# Patient Record
Sex: FEMALE | Race: WHITE | NOT HISPANIC OR LATINO | Employment: UNEMPLOYED | URBAN - METROPOLITAN AREA
[De-identification: names, ages, dates, MRNs, and addresses within clinical notes are randomized per-mention and may not be internally consistent; named-entity substitution may affect disease eponyms.]

---

## 2017-01-24 ENCOUNTER — TELEPHONE (OUTPATIENT)
Dept: FAMILY MEDICINE | Facility: CLINIC | Age: 67
End: 2017-01-24

## 2017-01-24 ENCOUNTER — OFFICE VISIT (OUTPATIENT)
Dept: FAMILY MEDICINE | Facility: CLINIC | Age: 67
End: 2017-01-24
Payer: OTHER GOVERNMENT

## 2017-01-24 VITALS
SYSTOLIC BLOOD PRESSURE: 99 MMHG | HEART RATE: 106 BPM | RESPIRATION RATE: 16 BRPM | TEMPERATURE: 99 F | HEIGHT: 66 IN | DIASTOLIC BLOOD PRESSURE: 70 MMHG | BODY MASS INDEX: 39.65 KG/M2 | WEIGHT: 246.69 LBS | OXYGEN SATURATION: 93 %

## 2017-01-24 DIAGNOSIS — J10.1 INFLUENZA A: Primary | ICD-10-CM

## 2017-01-24 PROCEDURE — 87804 INFLUENZA ASSAY W/OPTIC: CPT | Mod: ,,, | Performed by: INTERNAL MEDICINE

## 2017-01-24 PROCEDURE — 99213 OFFICE O/P EST LOW 20 MIN: CPT | Mod: S$GLB,,, | Performed by: INTERNAL MEDICINE

## 2017-01-24 RX ORDER — OSELTAMIVIR PHOSPHATE 75 MG/1
75 CAPSULE ORAL 2 TIMES DAILY
Qty: 10 CAPSULE | Refills: 0 | Status: SHIPPED | OUTPATIENT
Start: 2017-01-24 | End: 2017-01-29

## 2017-01-24 RX ORDER — PROMETHAZINE HYDROCHLORIDE AND DEXTROMETHORPHAN HYDROBROMIDE 6.25; 15 MG/5ML; MG/5ML
5 SYRUP ORAL EVERY 6 HOURS PRN
Qty: 200 ML | Refills: 0 | Status: SHIPPED | OUTPATIENT
Start: 2017-01-24 | End: 2017-02-03

## 2017-01-24 RX ORDER — INTERFERON BETA-1A 30MCG/.5ML
KIT INTRAMUSCULAR
COMMUNITY
Start: 2017-01-15 | End: 2017-01-26 | Stop reason: SDUPTHER

## 2017-01-24 RX ORDER — DOXYCYCLINE 40 MG/1
40 CAPSULE ORAL DAILY
COMMUNITY
Start: 2017-01-14 | End: 2019-04-30

## 2017-01-24 NOTE — TELEPHONE ENCOUNTER
----- Message from Jennifer Moya sent at 1/24/2017  9:59 AM CST -----  Contact: self: [phone]  Patient is feeling like she has bronchitis and feeling dizzy. She would like office to call her in something to the pharmacy at:      [facility] [place] [address]  [facility]  [address]  [facility]  [place] [address]  Phone: [phone] Fax: [phone]

## 2017-01-24 NOTE — TELEPHONE ENCOUNTER
Notified patient that medication can not be called in without appointment. Appointment scheduled for fever cough and congestion

## 2017-01-24 NOTE — PATIENT INSTRUCTIONS
Review started get better and then he get worse [facility] immediately; Tylenol or Motrin every 4 hours for fever

## 2017-01-24 NOTE — MR AVS SNAPSHOT
[facility]  [address]  [place] [address]  Phone: [phone]  Fax: [phone]                  Naomi Toledo   2017 2:20 PM   Office Visit    Description:  Female : 1950   Provider:  Jose Blackman MD   Department:  [facility]           Reason for Visit     Cough     Back Pain     Nausea     Dizziness           Diagnoses this Visit        Comments    Influenza A    -  Primary            To Do List           Future Appointments        Provider Department Dept Phone    3/3/2017 9:40 AM Martha Roth MD [facility] Multiple Sclerosis [phone]      Goals (5 Years of Data)     None      Follow-Up and Disposition     Return for if you are not better return in 2 weeks.    Follow-up and Disposition History       These Medications        Disp Refills Start End    oseltamivir (TAMIFLU) 75 MG capsule 10 capsule 0 2017    Take 1 capsule (75 mg total) by mouth 2 (two) times daily. - Oral    [facility] [address] [facility] Ph #: [phone]       promethazine-dextromethorphan (PROMETHAZINE-DM) 6.25-15 mg/5 mL Syrp 200 mL 0 2017 2/3/2017    Take 5 mLs by mouth every 6 (six) hours as needed. - Oral    Pharmacy: [facility] [address] [facility]  [facility] Ph #: [phone]         [facility] On Call     [facility] On Call Nurse Care Line -  Assistance  Registered nurses in the [facility] On Call Center provide clinical advisement, health education, appointment booking, and other advisory services.  Call for this free service at [phone].             Medications           Message regarding Medications     Verify the changes and/or additions to your medication regime listed below are the same as discussed with your clinician today.  If any of these changes or additions are incorrect, please notify your healthcare provider.        START taking these  "NEW medications        Refills    oseltamivir (TAMIFLU) 75 MG capsule 0    Sig: Take 1 capsule (75 mg total) by mouth 2 (two) times daily.    Class: Normal    Route: Oral    promethazine-dextromethorphan (PROMETHAZINE-DM) 6.25-15 mg/5 mL Syrp 0    Sig: Take 5 mLs by mouth every 6 (six) hours as needed.    Class: Normal    Route: Oral      STOP taking these medications     cyclobenzaprine (FLEXERIL) 5 MG tablet 1-2 tablets PO TID prn           Verify that the below list of medications is an accurate representation of the medications you are currently taking.  If none reported, the list may be blank. If incorrect, please contact your healthcare provider. Carry this list with you in case of emergency.           Current Medications     AVONEX 30 mcg/0.5 mL PnKt     cholecalciferol, vitamin D3, (VITAMIN D3) 5,000 unit Tab Take 5,000 Units by mouth once daily.    interferon beta-1a 30 mcg/0.5 mL PnIj Inject 30 mcg into the muscle every 7 days.    naproxen (NAPROSYN) 500 MG tablet Take 1 tablet (500 mg total) by mouth 2 (two) times daily as needed.    ORACEA 40 mg capsule     oseltamivir (TAMIFLU) 75 MG capsule Take 1 capsule (75 mg total) by mouth 2 (two) times daily.    promethazine-dextromethorphan (PROMETHAZINE-DM) 6.25-15 mg/5 mL Syrp Take 5 mLs by mouth every 6 (six) hours as needed.           Clinical Reference Information           Vital Signs - Last Recorded  Most recent update: 1/24/2017  2:42 PM by Margareth Sharif MA    BP Pulse Temp Resp Ht Wt    99/70 (BP Location: Left arm, Patient Position: Sitting, BP Method: Automatic) 106 99 °F (37.2 °C) (Oral) 16 5' 6" (1.676 m) 111.9 kg (246 lb 11.1 oz)    SpO2 BMI             (!) 93% 39.82 kg/m2         Blood Pressure          Most Recent Value    BP  99/70 [lying 118/73 p 98, standing 123/75 p 119]      Allergies as of 1/24/2017     No Known Allergies      Immunizations Administered on Date of Encounter - 1/24/2017     None      Orders Placed During Today's Visit "      Normal Orders This Visit    Influenza antigen Nasopharyngeal Swab       Instructions    Review started get better and then he get worse [facility] immediately; Tylenol or Motrin every 4 hours for fever

## 2017-01-24 NOTE — PROGRESS NOTES
"Subjective:       Patient ID: Naomi Toledo is a 66 y.o. female.    Chief Complaint: Cough; Back Pain; Nausea; and Dizziness    HPI       CHIEF COMPLAINT: Cough(+).  HPI:     ONSET/TIMING: .    ago    DURATION:               Paroxysmal: no .    QUALITY/COURSE:. unchanged    INTENSITY/SEVERITY: Severity is #  5 (10 point scale)      The following symptoms/statements are positive if BOLD, negative otherwise.      CONTEXT/WHEN:  Tobacco_use. Smokers_in_home. Seasonal_pattern. Allergies/Hayfever. Sinusitis. Irritant_Exposure(smoke/dust/fumes). Exposure_to_others_with_similar_symptoms.        Similar_problems_in_past.   PAST TREATMENT OR EVALUATION:   previous PPD. Recent_previous_chest_x-ray. Recent_antibiotics.  Associated Symptoms:     sputum production: scant. copious. Hemoptysis.  Medical History: Past_pulmonary_infections.  Cardiovascular_disease.chronic_lung_disease.  tuberculosis. Asthma. AIDS. Gastroesophageal_reflux_disease .      Review of Systems    Objective:      Vitals:    01/24/17 1432   BP: 99/70   Pulse: 106   Resp: 16   Temp: 99 °F (37.2 °C)   TempSrc: Oral   SpO2: (!) 93%   Weight: 111.9 kg (246 lb 11.1 oz)   Height: 5' 6" (1.676 m)   PainSc:   4   PainLoc: Back     Physical Exam   Constitutional: She appears well-developed and well-nourished.   HENT:   Right Ear: External ear normal.   Left Ear: External ear normal.   Mouth/Throat: Oropharynx is clear and moist. No oropharyngeal exudate.   Cardiovascular: Normal rate, regular rhythm and normal heart sounds.  Exam reveals no friction rub.    No murmur heard.  Pulmonary/Chest: Effort normal and breath sounds normal. No respiratory distress. She has no wheezes. She has no rales. She exhibits no tenderness.   Abdominal: Soft. Bowel sounds are normal. There is no tenderness.   Musculoskeletal: She exhibits no edema.   Lymphadenopathy:     She has no cervical adenopathy.   Nursing note and vitals reviewed.        Assessment:       1. Influenza A        "   Plan:     Influenza A  -     oseltamivir (TAMIFLU) 75 MG capsule; Take 1 capsule (75 mg total) by mouth 2 (two) times daily.  Dispense: 10 capsule; Refill: 0  -     promethazine-dextromethorphan (PROMETHAZINE-DM) 6.25-15 mg/5 mL Syrp; Take 5 mLs by mouth every 6 (six) hours as needed.  Dispense: 200 mL; Refill: 0  -     Influenza antigen Nasopharyngeal Swab      Return for if you are not better return in 2 weeks.

## 2017-01-26 DIAGNOSIS — G35 MS (MULTIPLE SCLEROSIS): ICD-10-CM

## 2017-01-26 LAB
CTP QC/QA: YES
FLUAV AG NPH QL: POSITIVE
FLUBV AG NPH QL: NEGATIVE

## 2017-02-22 ENCOUNTER — DOCUMENTATION ONLY (OUTPATIENT)
Dept: NEUROLOGY | Facility: CLINIC | Age: 67
End: 2017-02-22

## 2017-02-22 NOTE — PROGRESS NOTES
Left VM for patient to give our office a call to reschedule her appointment with Dr. Roth on 3/3/2017.  Provider on Bereavement Leave

## 2017-02-27 ENCOUNTER — TELEPHONE (OUTPATIENT)
Dept: NEUROLOGY | Facility: CLINIC | Age: 67
End: 2017-02-27

## 2017-02-27 NOTE — TELEPHONE ENCOUNTER
Returned call to Naomi. Rescheduled her with Dr. Roth, at pt's request, to 3/24 at 8:20. Appt letter mailed to patient.

## 2017-02-27 NOTE — TELEPHONE ENCOUNTER
----- Message from Marietta Vines sent at 2/27/2017  9:07 AM CST -----  Contact: self @ [phone]  Pt says she is returning a call concerning her appt on 3-3-17 being guy.  pls call with a new appt.

## 2017-03-08 ENCOUNTER — PATIENT MESSAGE (OUTPATIENT)
Dept: NEUROLOGY | Facility: CLINIC | Age: 67
End: 2017-03-08

## 2017-03-08 DIAGNOSIS — G35 MS (MULTIPLE SCLEROSIS): ICD-10-CM

## 2017-03-08 DIAGNOSIS — G35 MULTIPLE SCLEROSIS: Primary | ICD-10-CM

## 2017-03-08 NOTE — TELEPHONE ENCOUNTER
Estelle sent the following message:    I have enough Avonex for this month. I would appreciate a 90 day(per order) prescription at the end of this month.   The best days for lab work for me would be any Monday, Wednesday, or Friday.

## 2017-03-13 ENCOUNTER — LAB VISIT (OUTPATIENT)
Dept: LAB | Facility: HOSPITAL | Age: 67
End: 2017-03-13
Attending: PSYCHIATRY & NEUROLOGY
Payer: MEDICARE

## 2017-03-13 DIAGNOSIS — G35 MULTIPLE SCLEROSIS: ICD-10-CM

## 2017-03-13 LAB
ALBUMIN SERPL BCP-MCNC: 4.1 G/DL
ALP SERPL-CCNC: 87 U/L
ALT SERPL W/O P-5'-P-CCNC: 18 U/L
AST SERPL-CCNC: 15 U/L
BASOPHILS # BLD AUTO: 0.02 K/UL
BASOPHILS NFR BLD: 0.3 %
BILIRUB DIRECT SERPL-MCNC: 0.3 MG/DL
BILIRUB SERPL-MCNC: 0.9 MG/DL
DIFFERENTIAL METHOD: ABNORMAL
EOSINOPHIL # BLD AUTO: 0.1 K/UL
EOSINOPHIL NFR BLD: 1.4 %
ERYTHROCYTE [DISTWIDTH] IN BLOOD BY AUTOMATED COUNT: 13 %
HCT VFR BLD AUTO: 46.7 %
HGB BLD-MCNC: 15.8 G/DL
LYMPHOCYTES # BLD AUTO: 1 K/UL
LYMPHOCYTES NFR BLD: 17.5 %
MCH RBC QN AUTO: 29.8 PG
MCHC RBC AUTO-ENTMCNC: 33.8 %
MCV RBC AUTO: 88 FL
MONOCYTES # BLD AUTO: 0.5 K/UL
MONOCYTES NFR BLD: 9.4 %
NEUTROPHILS # BLD AUTO: 4.1 K/UL
NEUTROPHILS NFR BLD: 71.1 %
PLATELET # BLD AUTO: 211 K/UL
PMV BLD AUTO: 11 FL
PROT SERPL-MCNC: 7.6 G/DL
RBC # BLD AUTO: 5.31 M/UL
WBC # BLD AUTO: 5.77 K/UL

## 2017-03-13 PROCEDURE — 80076 HEPATIC FUNCTION PANEL: CPT

## 2017-03-13 PROCEDURE — 36415 COLL VENOUS BLD VENIPUNCTURE: CPT | Mod: PO

## 2017-03-13 PROCEDURE — 85025 COMPLETE CBC W/AUTO DIFF WBC: CPT

## 2017-03-24 ENCOUNTER — OFFICE VISIT (OUTPATIENT)
Dept: NEUROLOGY | Facility: CLINIC | Age: 67
End: 2017-03-24
Payer: MEDICARE

## 2017-03-24 ENCOUNTER — PATIENT MESSAGE (OUTPATIENT)
Dept: FAMILY MEDICINE | Facility: CLINIC | Age: 67
End: 2017-03-24

## 2017-03-24 VITALS
SYSTOLIC BLOOD PRESSURE: 104 MMHG | WEIGHT: 234 LBS | DIASTOLIC BLOOD PRESSURE: 70 MMHG | HEIGHT: 66 IN | BODY MASS INDEX: 37.61 KG/M2

## 2017-03-24 DIAGNOSIS — R29.6 FALLS FREQUENTLY: ICD-10-CM

## 2017-03-24 DIAGNOSIS — Z71.89 COUNSELING REGARDING GOALS OF CARE: ICD-10-CM

## 2017-03-24 DIAGNOSIS — G89.29 CHRONIC PAIN OF BOTH KNEES: ICD-10-CM

## 2017-03-24 DIAGNOSIS — M25.561 CHRONIC PAIN OF BOTH KNEES: ICD-10-CM

## 2017-03-24 DIAGNOSIS — Z29.89 PROPHYLACTIC IMMUNOTHERAPY: ICD-10-CM

## 2017-03-24 DIAGNOSIS — G35 MS (MULTIPLE SCLEROSIS): Primary | ICD-10-CM

## 2017-03-24 DIAGNOSIS — M25.562 CHRONIC PAIN OF BOTH KNEES: ICD-10-CM

## 2017-03-24 PROCEDURE — 99213 OFFICE O/P EST LOW 20 MIN: CPT | Mod: PBBFAC | Performed by: PSYCHIATRY & NEUROLOGY

## 2017-03-24 PROCEDURE — 99215 OFFICE O/P EST HI 40 MIN: CPT | Mod: S$PBB,,, | Performed by: PSYCHIATRY & NEUROLOGY

## 2017-03-24 PROCEDURE — 99999 PR PBB SHADOW E&M-EST. PATIENT-LVL III: CPT | Mod: PBBFAC,,, | Performed by: PSYCHIATRY & NEUROLOGY

## 2017-03-24 NOTE — PROGRESS NOTES
Subjective:       Patient ID: Naomi Toledo is a 67 y.o. female who presents today for a routine clinic visit for MS.  She was accompanied by her .     MS HPI:  · DMT: Avonex  · Side effects from DMT? No  · Taking vitamin D3 as recommended? Yes -  Dose: 5,000 IU daily  · Overall she feels stable;   · She fell 3 times last winter.      SOCIAL HISTORY  Social History   Substance Use Topics    Smoking status: Never Smoker    Smokeless tobacco: Never Used    Alcohol use No     Living arrangements - the patient lives with her .  Employment Homemaker    MS ROS:  · Fatigue: Yes - endurance different from 5 years ago;  · Sleep Disturbance: No  · Bladder Dysfunction: Yes - urgency; manages it;   · Bowel Dysfunction: Yes - urgency; manages it;   · Spasticity: No  · Visual Symptoms: No  · Cognitive: No  · Mood Disorder: No  · Gait Disturbance: Yes - as in HPI; related to knees;   · Falls: Yes - as in HPI  · Hand Dysfunction: Yes - fine motor issues in left hand;   · Pain: Yes - knee pain  · Sexual Dysfunction: Not Assessed  · Skin Breakdown: No  · Tremors: No  · Dysphagia:  No  · Dysarthria:  No  · Heat sensitivity:  No  · Any un-met adaptive needs? No  · Copay Assist?  Yes - $0  ·         Objective:        1. 25 foot timed walk: 4.5 seconds--no assist; antalgic  Timed 25 Foot Walk: 8/19/2016   Did patient wear an AFO? No   Was assistive device used? No   Time for 25 Foot Walk (seconds) 4.9   Time for 25 Foot Walk (seconds) 4.9     Neurologic Exam        MENTAL STATUS:  grossly intact;   CRANIAL NERVE EXAM:  She has fine nystagmus in the abducting eye on both right    gaze and left gaze.  Extraocular muscles are intact.  Pupils are equal, round,    and reactive to light.  No facial asymmetry.  Facial sensation is intact    bilaterally.  There is no dysarthria.       MOTOR EXAM:   strength is 5/5 in all groups in    the lower extremities and upper extremities.       REFLEXES:  2+ and symmetric throughout in  all four extremities; toes are down    bilaterally.     SENSORY EXAM:  She has significant loss of vibration in the bilateral hands, L>R     COORDINATION:  Normal finger-to-nose exam.     GAIT:  Her gait is slightly wide based, but relatively stable        Labs:     Lab Results   Component Value Date    [id number] 42 08/11/2016    [id number] 38 07/08/2015    [id number] 39 05/08/2015       Diagnosis/Assessment/Plan:    1. Multiple Sclerosis  · Assessment: She is clinically stable; I suspect falls related to her knee issues  · Imaging:will continue to defer for now given clinical stability; will focus on PT / exercise plan  · Disease Modifying Therapies: continue Avonex; recent LFTs/CBC nl    2. MS Symptom Assessment / Management  · Bladder Dysfunction: continue to monitor  · Bowel Dysfunction: continue to monitor           3. Osteoarthritis of the knees--pt will f/u with Dr. Javier      Over 50% of this 40 minute visit was spent in direct face to face counseling of the patient about her MS and the management of her various sx;        Falls frequently  -     Ambulatory Referral to Physical/Occupational Therapy    Chronic pain of both knees  -     Ambulatory Referral to Physical/Occupational Therapy    MS (multiple sclerosis)  -     Ambulatory Referral to Physical/Occupational Therapy    F/u 6 mo with Fay Snyder PA-C

## 2017-03-24 NOTE — MR AVS SNAPSHOT
"    [facility]  [address]  [facility] [address]  Phone: [phone]                  Naomi Toledo   3/24/2017 8:20 AM   Office Visit    Description:  Female : 1950   Provider:  Martha Roth MD   Department:  [facility]           Reason for Visit     Neurologic Problem           Diagnoses this Visit        Comments    Falls frequently    -  Primary     Chronic pain of both knees         MS (multiple sclerosis)                To Do List           Goals (5 Years of Data)     None      Follow-Up and Disposition     Return in about 6 months (around 2017).      [facility]     [facility] On [facility] Nurse Care Line -  Assistance  Registered nurses in the [facility] On Call Center provide clinical advisement, health education, appointment booking, and other advisory services.  Call for this free service at [phone].             Medications           Message regarding Medications     Verify the changes and/or additions to your medication regime listed below are the same as discussed with your clinician today.  If any of these changes or additions are incorrect, please notify your healthcare provider.             Verify that the below list of medications is an accurate representation of the medications you are currently taking.  If none reported, the list may be blank. If incorrect, please contact your healthcare provider. Carry this list with you in case of emergency.           Current Medications     cholecalciferol, vitamin D3, (VITAMIN D3) 5,000 unit Tab Take 5,000 Units by mouth once daily.    interferon beta-1a 30 mcg/0.5 mL PnIj Inject 30 mcg into the muscle every 7 days.    naproxen (NAPROSYN) 500 MG tablet Take 1 tablet (500 mg total) by mouth 2 (two) times daily as needed.    ORACEA 40 mg capsule            Clinical Reference Information           Your Vitals Were     BP Height Weight BMI       104/70 5' 6" (1.676 m) 106.1 kg (234 lb) " 37.77 kg/m2       Blood Pressure          Most Recent Value    BP  104/70      Allergies as of 3/24/2017     No Known Allergies      Immunizations Administered on Date of Encounter - 3/24/2017     None      Orders Placed During Today's Visit      Normal Orders This Visit    Ambulatory Referral to Physical/Occupational Therapy       Language Assistance Services     ATTENTION: Language assistance services are available, free of charge. Please call [phone].      ATENCIÓN: Si habla español, tiene a keller disposición servicios gratuitos de asistencia lingüística. Llame al [phone].     CHÚ Ý: N?u b?n nói Ti?ng Vi?t, có các d?ch v? h? tr? ngôn ng? mi?n phí dành cho b?n. G?i s? [phone].         [facility] complies with applicable Federal civil rights laws and does not discriminate on the basis of race, color, national origin, age, disability, or sex.

## 2017-03-27 ENCOUNTER — TELEPHONE (OUTPATIENT)
Dept: FAMILY MEDICINE | Facility: CLINIC | Age: 67
End: 2017-03-27

## 2017-03-27 ENCOUNTER — PATIENT MESSAGE (OUTPATIENT)
Dept: FAMILY MEDICINE | Facility: CLINIC | Age: 67
End: 2017-03-27

## 2017-03-27 DIAGNOSIS — R73.9 HYPERGLYCEMIA: Primary | ICD-10-CM

## 2017-03-27 DIAGNOSIS — E66.01 MORBID OBESITY DUE TO EXCESS CALORIES: ICD-10-CM

## 2017-03-31 ENCOUNTER — LAB VISIT (OUTPATIENT)
Dept: LAB | Facility: HOSPITAL | Age: 67
End: 2017-03-31
Attending: INTERNAL MEDICINE
Payer: MEDICARE

## 2017-03-31 DIAGNOSIS — R73.9 HYPERGLYCEMIA: ICD-10-CM

## 2017-03-31 DIAGNOSIS — E66.01 MORBID OBESITY DUE TO EXCESS CALORIES: ICD-10-CM

## 2017-03-31 LAB
ALBUMIN SERPL BCP-MCNC: 4 G/DL
ALP SERPL-CCNC: 75 U/L
ALT SERPL W/O P-5'-P-CCNC: 16 U/L
ANION GAP SERPL CALC-SCNC: 11 MMOL/L
AST SERPL-CCNC: 15 U/L
BILIRUB SERPL-MCNC: 0.8 MG/DL
BUN SERPL-MCNC: 23 MG/DL
CALCIUM SERPL-MCNC: 9.4 MG/DL
CHLORIDE SERPL-SCNC: 105 MMOL/L
CHOLEST/HDLC SERPL: 3.6 {RATIO}
CO2 SERPL-SCNC: 25 MMOL/L
CREAT SERPL-MCNC: 1 MG/DL
EST. GFR  (AFRICAN AMERICAN): >60 ML/MIN/1.73 M^2
EST. GFR  (NON AFRICAN AMERICAN): 59.3 ML/MIN/1.73 M^2
GLUCOSE SERPL-MCNC: 104 MG/DL
HDL/CHOLESTEROL RATIO: 27.7 %
HDLC SERPL-MCNC: 191 MG/DL
HDLC SERPL-MCNC: 53 MG/DL
LDLC SERPL CALC-MCNC: 122 MG/DL
NONHDLC SERPL-MCNC: 138 MG/DL
POTASSIUM SERPL-SCNC: 4.4 MMOL/L
PROT SERPL-MCNC: 7.1 G/DL
SODIUM SERPL-SCNC: 141 MMOL/L
TRIGL SERPL-MCNC: 80 MG/DL

## 2017-03-31 PROCEDURE — 80053 COMPREHEN METABOLIC PANEL: CPT

## 2017-03-31 PROCEDURE — 83036 HEMOGLOBIN GLYCOSYLATED A1C: CPT

## 2017-03-31 PROCEDURE — 36415 COLL VENOUS BLD VENIPUNCTURE: CPT | Mod: PO

## 2017-03-31 PROCEDURE — 80061 LIPID PANEL: CPT

## 2017-04-01 LAB
ESTIMATED AVG GLUCOSE: 117 MG/DL
HBA1C MFR BLD HPLC: 5.7 %

## 2017-04-03 ENCOUNTER — CLINICAL SUPPORT (OUTPATIENT)
Dept: REHABILITATION | Facility: HOSPITAL | Age: 67
End: 2017-04-03
Attending: PSYCHIATRY & NEUROLOGY
Payer: MEDICARE

## 2017-04-03 DIAGNOSIS — R29.6 FALLS FREQUENTLY: ICD-10-CM

## 2017-04-03 DIAGNOSIS — G89.29 CHRONIC PAIN OF BOTH KNEES: Chronic | ICD-10-CM

## 2017-04-03 DIAGNOSIS — M25.562 CHRONIC PAIN OF BOTH KNEES: Chronic | ICD-10-CM

## 2017-04-03 DIAGNOSIS — M25.561 CHRONIC PAIN OF BOTH KNEES: Chronic | ICD-10-CM

## 2017-04-03 PROCEDURE — 97161 PT EVAL LOW COMPLEX 20 MIN: CPT | Mod: PN

## 2017-04-03 PROCEDURE — G8978 MOBILITY CURRENT STATUS: HCPCS | Mod: CJ,PN

## 2017-04-03 PROCEDURE — G8979 MOBILITY GOAL STATUS: HCPCS | Mod: CI,PN

## 2017-04-03 NOTE — PLAN OF CARE
TIME RECORD    Date: 04/03/2017    Start Time:  1202  Stop Time:  1259    PROCEDURES:    TIMED  Procedure Time Min.    Start:  Stop:     Start:  Stop:     Start:  Stop:     Start:  Stop:          UNTIMED  Procedure Time Min.   Initial eval Start:1202  Stop:1259 57 min    Start:  Stop:      Total Timed Minutes:  0  Total Timed Units:  0  Total Untimed Units:  1  Charges Billed/# of units:  1    OUTPATIENT PHYSICAL THERAPY   PATIENT EVALUATION  Onset Date: B knee pain prior to initial fall 10/15/2016 w fall on L knee  Primary Diagnosis:   1. Falls frequently     2. Chronic pain of both knees       Treatment Diagnosis: impaired functional mobility  Past Medical History:   Diagnosis Date    Blood transfusion     Deep vein thrombosis     MS (multiple sclerosis)     Plantar fasciitis of left foot 2/2014     Precautions: MS flair ups  Prior Therapy: 1 year ago for B knee and for shoulder issues  Medications: Naomi Toledo has a current medication list which includes the following prescription(s): cholecalciferol (vitamin d3), interferon beta-1a, naproxen, and oracea.  Nutrition:  Obese  History of Present Illness: MS diagnosis 2002, large lesion on cervical spine.Falls w injury to L knee. Pt seen for f/u w neurologist related to MS w recommendation for PT to address concerns w f  History of falls and chronic knee pain  Prior Level of Function: Independent  Social History: , hobbies: computer oriented, Bible study,   Place of Residence (Steps/Adaptations): single level home w no stairs  Functional Deficits Leading to Referral/Nature of Injury: referred due to concerns w falls and knee pain  Patient Therapy Goals: be free of knee pain w stair climbing and pain free after prolonged sitting    Subjective     Naomi Toledo states she has appt w orthopedist later this week for her knee pain. She is attributing it more to her age than the MS. Pt reports heat exacerbates symptoms and prefers ice when needed. She  "has tried PT in the past and is willing to return in hopes of resolving positional knee pain.    Pain:  Location: B anterior knee region   Description: Aching  Activities Which Increase Pain: stairs, prolonged position, pain when sleeping  Activities Which Decrease Pain: rest, reposition  Pain Scale: 0/10 at best 0/10 now  5/10 at worst    Objective     Posture: forward head, rounded shoulders  Palpation: tender to pressure at patellar tendon B  Sensation: intact  DTRs:2+ gastroc and quads  Range of Motion/Strength:   Knee  Right   Left  Pain/Dysfunction with Movement    AROM PROM MMT AROM PROM MMT    knee flexion 115  5 115  5    Knee ext 0  5 0  5 Ant knee pain w resistance   Hip flex   5   5    Hip add   4   4    Hip abd   5   5    Ankle DF   5   5    Ankle PF   4+   4+      Flexibility: popliteal angle -24* B  Gait: Without AD  Analysis: decreased step length, decreased wt shift  Bed Mobility:Independent  Transfers: Independent  Special Tests:   Lower Extremity Functional Scale:53/80=33.75% impairment  G code modifier current:  G code modifier goal:CI    Elizabeth Balance Scale:54/56    Treatment: Discussed goals w pt in agreement w proposed POC. Assessed ability to flex and ext knee against  resistance w  tolerating 22# well. Able to tolerate Shuttle 50# B, however w R then  L pt w increase in knee pain at tibial plateau    Assessment       Initial Assessment (Pertinent finding, problem list and factors affecting outcome): Pt tested well on BBS w exception of single leg stance and 360* turn speed time. Muscle weakness noted B gastroc and hip adductors. Pt would benefit from skilled PT to address improving higher level balance, and gait for fall prevention as well as addressing pain reduction B knees  Rehab Potiential: good    Short Term Goals (3 Weeks):   1. Instruct in HEP  2. Pt performs 10 single leg heel raises without HHA  3. Pt pain free w 3 x 10 reps shuttle 75# B  4. Pt ascends 6" step 3 x 10 reps " free of knee pain R/L  Long Term Goals (6 Weeks):   1. Pt I w HEP  2. Pt w SLS 20 sec R/L no HHA  3. LEFS less than 20% impairment  4. Pt ascends /descends 21 stairs with no report of knee pain    Plan     Certification Period: 4/3/17 to 5/22/17  Recommended Treatment Plan: 2 times per week for 6 weeks: Electrical Stimulation IFC prn, Gait Training, Group Therapy, Manual Therapy, Moist Heat/ Ice, Neuromuscular Re-ed, Patient Education, Therapeutic Activites and Therapeutic Exercise  Other Recommendations: kinesiotaping      Therapist: Emely Ramachandran, PT    I CERTIFY THE NEED FOR THESE SERVICES FURNISHED UNDER THIS PLAN OF TREATMENT AND WHILE UNDER MY CARE    Physician's comments: ________________________________________________________________________________________________________________________________________________      Physician's Name: ___________________________________

## 2017-04-04 DIAGNOSIS — M25.569 KNEE PAIN, UNSPECIFIED CHRONICITY, UNSPECIFIED LATERALITY: Primary | ICD-10-CM

## 2017-04-05 ENCOUNTER — HOSPITAL ENCOUNTER (OUTPATIENT)
Dept: RADIOLOGY | Facility: HOSPITAL | Age: 67
Discharge: HOME | End: 2017-04-05
Attending: ORTHOPAEDIC SURGERY
Payer: MEDICARE

## 2017-04-05 ENCOUNTER — OFFICE VISIT (OUTPATIENT)
Dept: ORTHOPEDICS | Facility: CLINIC | Age: 67
End: 2017-04-05
Payer: MEDICARE

## 2017-04-05 VITALS — WEIGHT: 234 LBS | HEIGHT: 66 IN | BODY MASS INDEX: 37.61 KG/M2

## 2017-04-05 DIAGNOSIS — M17.12 OSTEOARTHROSIS, LOCALIZED, PRIMARY, KNEE, LEFT: Primary | ICD-10-CM

## 2017-04-05 DIAGNOSIS — M17.11 OSTEOARTHROSIS, LOCALIZED, PRIMARY, KNEE, RIGHT: ICD-10-CM

## 2017-04-05 DIAGNOSIS — M25.569 KNEE PAIN, UNSPECIFIED CHRONICITY, UNSPECIFIED LATERALITY: ICD-10-CM

## 2017-04-05 PROCEDURE — 99999 PR PBB SHADOW E&M-EST. PATIENT-LVL II: CPT | Mod: PBBFAC,,, | Performed by: ORTHOPAEDIC SURGERY

## 2017-04-05 PROCEDURE — 73564 X-RAY EXAM KNEE 4 OR MORE: CPT | Mod: TC,50

## 2017-04-05 PROCEDURE — 99213 OFFICE O/P EST LOW 20 MIN: CPT | Mod: S$PBB,,, | Performed by: ORTHOPAEDIC SURGERY

## 2017-04-05 PROCEDURE — 73564 X-RAY EXAM KNEE 4 OR MORE: CPT | Mod: 26,50,, | Performed by: RADIOLOGY

## 2017-04-06 NOTE — PROGRESS NOTES
"DATE: 4/5/2017  PATIENT: Naomi Toledo    Attending Physician: Gary Javier M.D.    HISTORY:  Naomi Toledo is a 67 y.o. female who returns for follow up evaluation of  her bilateral knees.  She is been previously diagnosed with osteoarthrosis.  She states her left knee bothers her more than the right.  I last saw her in February 2016.  She states that she fell 3 times since the alicia.  She is currently taking no medicine.  She does note discomfort medially more so than laterally.  Pain is reported at 1/10 currently.  Today for evaluation.    PMH/PSH/FamHx/SocHx:  Reviewed and unchanged from prior visit    ROS:  Constitution: Negative for chills, fever, and sweats. Negative for unexplained weight loss.  HENT: Negative for headaches and blurry vision.   Cardiovascular: Negative for chest pain, irregular heartbeat, leg swelling and palpitations.   Respiratory: Negative for cough and shortness of breath.   Gastrointestinal: Negative for abdominal pain, heartburn, nausea and vomiting.   Genitourinary: Negative for bladder incontinence and dysuria.   Musculoskeletal: Negative for systemic arthritis, joint swelling, muscle weakness and myalgias.   Neurological: Negative for numbness.   Psychiatric/Behavioral: Negative for depression.   Endocrine: Negative for polyuria.   Hematologic/Lymphatic: Negative for bleeding disorders.  Skin: Negative for poor wound healing.       EXAM:  Ht 5' 6" (1.676 m)  Wt 106.1 kg (234 lb)  BMI 37.77 kg/m2  Naomi Toledo is a well developed, well nourished female in no acute distress. Physical examination of the bilateral knee evaluated the following:     Gait and Alignment  Inspection for ecchymosis, swelling, atrophy, or deformity  Inspection for intra-articular and/or bursal effusions  Tenderness to palpation over the bony and soft tissue structures around the knee  Range of Motion and presence of extensor lag/contractures  Sensation and motor strength  Varus/valgus or " anterior/posterior/rotatory instability  Flexion pinch and Ramin's Tests  Patellar alignment/tracking/pain to palpation     Remarkable findings included:  normal alignment. No effusions noted. There is medial joint line tenderness left greater than right. Range of motion 0-125° bilaterally. No instability to on exam to either knee. Positive flexion pinch left greater than right. Ramin's testing nonspecific.       IMAGING:   X-rays of both knees performed today and reviewed.  No acute fractures or dislocations are seen.  Moderate to significant degenerative changes with medial compartment near bone-on-bone narrowing noted bilaterally.    ASSESSMENT:  Osteoarthrosis both knees    PLAN:  The implications of the patient's evolution of symptoms and findings were explained to the patient in detail.Treatment option discussed included observation, activity modification, anti-inflammatory medication (she cannot take due to GI distress), cortisone injection, and referral for arthroplasty.. All questions answered and the patient wishes to observe her symptoms at the present time.  She states her symptoms are tolerable at 1/10.  Should her symptoms worsen, she'll return for reexamination and treatment recommendations..          This note was dictated using voice recognition software and may contain grammatical errors

## 2017-04-07 ENCOUNTER — OFFICE VISIT (OUTPATIENT)
Dept: FAMILY MEDICINE | Facility: CLINIC | Age: 67
End: 2017-04-07
Payer: MEDICARE

## 2017-04-07 ENCOUNTER — DOCUMENTATION ONLY (OUTPATIENT)
Dept: FAMILY MEDICINE | Facility: CLINIC | Age: 67
End: 2017-04-07

## 2017-04-07 VITALS
WEIGHT: 235.69 LBS | SYSTOLIC BLOOD PRESSURE: 113 MMHG | OXYGEN SATURATION: 100 % | DIASTOLIC BLOOD PRESSURE: 75 MMHG | HEART RATE: 73 BPM | HEIGHT: 66 IN | TEMPERATURE: 98 F | BODY MASS INDEX: 37.88 KG/M2

## 2017-04-07 DIAGNOSIS — G35 MULTIPLE SCLEROSIS: ICD-10-CM

## 2017-04-07 DIAGNOSIS — Z23 NEED FOR TETANUS BOOSTER: ICD-10-CM

## 2017-04-07 DIAGNOSIS — Z23 NEED FOR VACCINATION WITH 13-POLYVALENT PNEUMOCOCCAL CONJUGATE VACCINE: ICD-10-CM

## 2017-04-07 DIAGNOSIS — N95.9 MENOPAUSAL AND PERIMENOPAUSAL DISORDER: ICD-10-CM

## 2017-04-07 DIAGNOSIS — R73.9 HYPERGLYCEMIA: Primary | ICD-10-CM

## 2017-04-07 PROCEDURE — 99213 OFFICE O/P EST LOW 20 MIN: CPT | Mod: S$GLB,,, | Performed by: INTERNAL MEDICINE

## 2017-04-07 PROCEDURE — 90670 PCV13 VACCINE IM: CPT | Mod: S$GLB,,, | Performed by: INTERNAL MEDICINE

## 2017-04-07 PROCEDURE — G0009 ADMIN PNEUMOCOCCAL VACCINE: HCPCS | Mod: S$GLB,,, | Performed by: INTERNAL MEDICINE

## 2017-04-07 NOTE — PROGRESS NOTES
Health Maintenance Due   Topic Date Due    TETANUS VACCINE  02/28/1970    DEXA SCAN  02/28/1992    Zoster Vaccine  02/28/2012    Influenza Vaccine  08/01/2016    Pneumococcal (65+) (1 of 2 - PCV13) 02/28/2017

## 2017-04-07 NOTE — PROGRESS NOTES
"Subjective:       Patient ID: Naomi Toledo is a 67 y.o. female.    Chief Complaint: Follow-up    HPI       Patient has multiple sclerosis.  It's under very good control.  She has no weakness.  No neurological complaints.                                                                                             CHIEF COMPLAINT: hyperglycemia. .  HPI:     ONSET/TIMING:     QUALITY/COURSE:   unchanged..  Controlled: yes.     INTENSITY/SEVERITY: . Measures blood sugar :  none     CONTEXT/WHEN: last HgbA1c    Lab Results   Component Value Date    HGBA1C 5.7 03/31/2017      weights:    Wt Readings from Last 1 Encounters:   04/07/17 1122 106.9 kg (235 lb 10.8 oz)         SYMPTOMS/RELATED: . . Possible medication side effects include:     The following symptoms/statements  are present IF IN BOLD, negative otherwise.         MODIFIERS/TREATMENTS: Not_taking_medications:  .  Non-compliance_with_Diabetic_diet  .  No_eye_exam_within_last_year.  Not_practicing_good_foot_care.    REVIEW OF SYMPTOMS: . Weight_gain. Weight_loss 34 lbs. . Neuropathy. Recurrent_infections. Skin_ulcers    Review of Systems    Objective:      Vitals:    04/07/17 1122   BP: 113/75   Pulse: 73   Temp: 98 °F (36.7 °C)   TempSrc: Oral   SpO2: 100%   Weight: 106.9 kg (235 lb 10.8 oz)   Height: 5' 6" (1.676 m)   PainSc: 0-No pain     Physical Exam   Constitutional: She appears well-developed and well-nourished.   Eyes: Pupils are equal, round, and reactive to light.   Cardiovascular: Normal rate, regular rhythm and normal heart sounds.    Pulmonary/Chest: Effort normal and breath sounds normal.   Abdominal: Soft. There is no tenderness.   Neurological: She is alert.   Psychiatric: She has a normal mood and affect. Her behavior is normal. Thought content normal.   Nursing note and vitals reviewed.        Assessment:       1. Hyperglycemia    2. Need for tetanus booster    3. Need for vaccination with 13-polyvalent pneumococcal conjugate vaccine    4. " Menopausal and perimenopausal disorder    5. BMI 38.0-38.9,adult    6. Multiple sclerosis          Plan:     Hyperglycemia  -     Hemoglobin A1c; Future; Expected date: 4/7/17    Need for tetanus booster    Need for vaccination with 13-polyvalent pneumococcal conjugate vaccine  -     Pneumococcal Conjugate Vaccine (13 Valent) (IM)    Menopausal and perimenopausal disorder  -     DXA Bone Density Spine And Hip; Future; Expected date: 4/7/17    BMI 38.0-38.9,adult    Multiple sclerosis      Return in about 6 months (around 10/7/2017).

## 2017-04-07 NOTE — MR AVS SNAPSHOT
[facility]  [address]  [place] [address]  Phone: [phone]  Fax: [phone]                  Naomi Toledo   2017 11:40 AM   Office Visit    Description:  Female : 1950   Provider:  Jose Blackman MD   Department:  [facility]           Reason for Visit     Follow-up           Diagnoses this Visit        Comments    Hyperglycemia    -  Primary     Need for tetanus booster         Need for vaccination with 13-polyvalent pneumococcal conjugate vaccine         Menopausal and perimenopausal disorder         BMI 38.0-38.9,adult         Multiple sclerosis                To Do List           Future Appointments        Provider Department Dept Phone    4/10/2017 1:00 PM Yadira Solano, PT [facility] [phone]    2017 11:00 AM Emely Ramachandran, PT [facility] [phone]    2017 9:00 AM Emely Ramachandran PT [facility] [phone]    2017 9:00 AM Emely Ramachandran PT [facility] [phone]    2017 9:00 AM Emely Ramachandran, PT [facility] [phone]      Goals (5 Years of Data)     None      Follow-Up and Disposition     Return in about 6 months (around 10/7/2017).      [facility]     [facility] Care [facility] -  Assistance  Unless otherwise directed by your provider, please contact [facility], our nurse care line that is available for  assistance.     Registered nurses in the [facility] provide: appointment scheduling, clinical advisement, health education, and other advisory services.  Call: [phone] (toll free)               Medications           Message regarding Medications     Verify the changes and/or additions to your medication regime listed below are the same as discussed with your clinician today.  If any of these changes or additions are incorrect, please notify your healthcare  "provider.             Verify that the below list of medications is an accurate representation of the medications you are currently taking.  If none reported, the list may be blank. If incorrect, please contact your healthcare provider. Carry this list with you in case of emergency.           Current Medications     cholecalciferol, vitamin D3, (VITAMIN D3) 5,000 unit Tab Take 5,000 Units by mouth once daily.    interferon beta-1a 30 mcg/0.5 mL PnIj Inject 30 mcg into the muscle every 7 days.    naproxen (NAPROSYN) 500 MG tablet Take 1 tablet (500 mg total) by mouth 2 (two) times daily as needed.    ORACEA 40 mg capsule            Clinical Reference Information           Your Vitals Were     BP Pulse Temp Height Weight SpO2    113/75 (BP Location: Right arm, Patient Position: Sitting, BP Method: Automatic) 73 98 °F (36.7 °C) (Oral) 5' 6" (1.676 m) 106.9 kg (235 lb 10.8 oz) 100%    BMI                38.04 kg/m2          Blood Pressure          Most Recent Value    BP  113/75      Allergies as of 4/7/2017     No Known Allergies      Immunizations Administered on Date of Encounter - 4/7/2017     Name Date Dose VIS Date Route    Pneumococcal Conjugate - 13 Valent 4/7/2017 0.5 mL 11/5/2015 Intramuscular      Orders Placed During Today's Visit      Normal Orders This Visit    Pneumococcal Conjugate Vaccine (13 Valent) (IM)     Future Labs/Procedures Expected by Expires    DXA Bone Density Spine And Hip  4/7/2017 7/6/2017    Hemoglobin A1c  4/7/2017 4/8/2018      Instructions    The Medicare you have to go the pharmacy to get the DTaP.    Continue your very successful diet.       Language Assistance Services     ATTENTION: Language assistance services are available, free of charge. Please call [phone].      ATENCIÓN: Si habla español, tiene a keller disposición servicios gratuitos de asistencia lingüística. Llame al [phone].     CHÚ Ý: N?u b?n nói Ti?ng Vi?t, có các d?ch v? h? tr? ngôn ng? mi?n phí dành cho b?n. " G?i s? [phone].         [facility] complies with applicable Federal civil rights laws and does not discriminate on the basis of race, color, national origin, age, disability, or sex.

## 2017-04-07 NOTE — PROGRESS NOTES
Allergies and two pt identifiers verified.  Prevnar 13 vaccine administered IM per MD order and MAR.  Tolerated injection well.  VIS given and instructed to wait 15 minutes before leaving clinic to monitor for adverse reactions.  Voiced understanding.

## 2017-04-10 ENCOUNTER — CLINICAL SUPPORT (OUTPATIENT)
Dept: REHABILITATION | Facility: HOSPITAL | Age: 67
End: 2017-04-10
Attending: PSYCHIATRY & NEUROLOGY
Payer: MEDICARE

## 2017-04-10 DIAGNOSIS — M25.562 CHRONIC PAIN OF BOTH KNEES: ICD-10-CM

## 2017-04-10 DIAGNOSIS — G89.29 CHRONIC PAIN OF BOTH KNEES: ICD-10-CM

## 2017-04-10 DIAGNOSIS — M25.561 CHRONIC PAIN OF BOTH KNEES: ICD-10-CM

## 2017-04-10 DIAGNOSIS — R29.6 FALLS FREQUENTLY: ICD-10-CM

## 2017-04-10 PROCEDURE — 97110 THERAPEUTIC EXERCISES: CPT | Mod: PN

## 2017-04-10 NOTE — PROGRESS NOTES
"Name: Naomi Toledo  Date:   04/10/2017  Primary Diagnosis: .  Problem List Items Addressed This Visit     Falls frequently    Chronic pain of both knees          Time in: 1301  Time Out: 1355  Total Treatment Time: 54  Group Time: 0      Subjective:    Patient reports no change in s/s from initial evaluation.  Patient reports their pain to be 0/10 on a 0-10 scale with 0 being no pain and 10 being the worst pain imaginable.    Objective    Patient received individual therapy to address strength, endurance, ROM and flexibility with 0 other patients with activities as follows:     Patient received therapeutic exercises to develop strength, endurance, ROM and flexibility for 54 minutes including:     Nustep L2 x 10 min LE's only  Standing gastroc stretch 3/30" B  // bars: standing airex EC 3/15 sec, SLS 3/15 sec level tile, HR/TR 3x10 level tile  QS 3x10   SLR flex 3x10  SAQ 3x10  Shuttle 75# 3x10 B, 50# 3x10 single, alternating    Assessment:     Patient tolerating treatment well.    Pt will continue to benefit from skilled PT intervention. Medical Necessity is demonstrated by:  Pain limits function of effected part for some activities, Unable to participate fully in daily activities and Weakness.    Patient is making good progress towards established goals.    Plan:  Continue with established Plan of Care towards PT goals.     "

## 2017-04-12 ENCOUNTER — CLINICAL SUPPORT (OUTPATIENT)
Dept: REHABILITATION | Facility: HOSPITAL | Age: 67
End: 2017-04-12
Attending: PSYCHIATRY & NEUROLOGY
Payer: MEDICARE

## 2017-04-12 DIAGNOSIS — R29.6 FALLS FREQUENTLY: ICD-10-CM

## 2017-04-12 DIAGNOSIS — G89.29 CHRONIC PAIN OF BOTH KNEES: ICD-10-CM

## 2017-04-12 DIAGNOSIS — M25.561 CHRONIC PAIN OF BOTH KNEES: ICD-10-CM

## 2017-04-12 DIAGNOSIS — M25.562 CHRONIC PAIN OF BOTH KNEES: ICD-10-CM

## 2017-04-12 PROCEDURE — 97150 GROUP THERAPEUTIC PROCEDURES: CPT | Mod: PN

## 2017-04-12 NOTE — PROGRESS NOTES
Name: Naomi Toledo  Clinic Number: [id number]  Date of Treatment: 04/12/2017   Diagnosis:   Encounter Diagnoses   Name Primary?    Falls frequently     Chronic pain of both knees        Time in: 1102  Time Out: 1159  Total Treatment Time: 57  Group Time: 0      Subjective:    Naomi reports noticing her need to wrk on her balance.  Patient reports their pain to be 0/10 on a 0-10 scale with 0 being no pain and 10 being the worst pain imaginable.    Objective    Patient received group therapy to increase strength, endurance, core stabilization and balance with 1 patients with activities as follows:     Naomi received therapeutic activities to develop strength, endurance, core stabilization and balance for 57 minutes including:     Bike 10' R 1.5  // bars:  standing gastroc stretch 3 x 30 sec B on foam dome   SLS 3 x 30 sec R/L on level tile then airex  Tandem stance 3 x 30 sec leading R then L 3 x 30 sec  HR x 30  Shuttle x 30:  75# B  50# R LE  32# L LE  50# B w wobble board    3 x 10:  33# B extension  22# R/L extension  33# B flexion   22# R/L flexion  Balance ex when Amb 4 x 30 ft:   head turn R/L   change in gait speed   change in direction      Written Home Exercises Provided: to review next session  Pt demo good understanding of the education provided. Naomi demonstrated good return demonstration of activities.     Assessment:       Pt will continue to benefit from skilled PT intervention. Medical Necessity is demonstrated by:  Pain limits function of effected part for some activities, Requires skilled supervision to complete and progress HEP and Weakness.    Patient is making fair progress towards established goals.    Plan:  Continue with established Plan of Care towards PT goals.

## 2017-04-13 NOTE — PATIENT INSTRUCTIONS
Knee Extension: Resisted (Sitting)        With band looped around right ankle and under other foot, straighten leg with ankle loop. Keep other leg bent to increase resistance.  Repeat 10____ times per set. Do ___3_ sets per session. Do __1__ sessions per day.     https://[employer]/690     Copyright © [employer]. All rights reserved.

## 2017-04-17 ENCOUNTER — CLINICAL SUPPORT (OUTPATIENT)
Dept: REHABILITATION | Facility: HOSPITAL | Age: 67
End: 2017-04-17
Attending: PSYCHIATRY & NEUROLOGY
Payer: MEDICARE

## 2017-04-17 DIAGNOSIS — M25.562 CHRONIC PAIN OF BOTH KNEES: ICD-10-CM

## 2017-04-17 DIAGNOSIS — M25.561 CHRONIC PAIN OF BOTH KNEES: ICD-10-CM

## 2017-04-17 DIAGNOSIS — R29.6 FALLS FREQUENTLY: ICD-10-CM

## 2017-04-17 DIAGNOSIS — G89.29 CHRONIC PAIN OF BOTH KNEES: ICD-10-CM

## 2017-04-17 PROCEDURE — 97110 THERAPEUTIC EXERCISES: CPT | Mod: PN

## 2017-04-17 NOTE — PROGRESS NOTES
Name: Naomi Toledo  Clinic Number: [id number]  Date of Treatment: 04/17/2017   Diagnosis:   Encounter Diagnoses   Name Primary?    Falls frequently     Chronic pain of both knees        Time in: 902  Time Out: 959  Total Treatment Time: 57  Group Time: 0      Subjective:    Naomi reports increase in low back pain and L hip pain. She reports L hip pain being similar to phlebitis experienced in the past. Patient reports their hip pain to be 3/10 and back pain 5/10 on a 0-10 scale with 0 being no pain and 10 being the worst pain imaginable.    Objective    Patient received individual therapy to increase strength, endurance, posture and core stabilization with 0 patients with activities as follows:     Naomi received therapeutic exercises to develop strength, endurance, posture and core stabilization for 57 minutes including:     NuStep L 2 10' w UE portion  // bars:  standing gastroc stretch 3 x 30 sec B  SLS 3 x 30 sec R/L  SLS w HR 10 R/L  Supine:  Clamshells w GTB x 30  DKTC w green physioball x 30  LTR w green physioball x 30  SLR 3 x 10 R/L  Passive HS stretch 2 x 30 sec R/L  Foam roller to lateral thigh R/L w pt in sidelying 2' each  Hip ER stretch supine 2 x 30 sec R/L  Seated resisted knee ext w GTB 3 x 10    Written Home Exercises Provided: SLS, HR, SLR, LAQ w theraband  Pt demo good understanding of the education provided. Naomi demonstrated good return demonstration of activities.     Assessment:   Consistent crepitus L medial knee with LAQ ex  Discussed repositioning foot w SLR to open medial knee joint space  Hypersensitive to MT along lateral thigh L>R  Pt will continue to benefit from skilled PT intervention. Medical Necessity is demonstrated by:  Pain limits function of effected part for some activities, Requires skilled supervision to complete and progress HEP and Weakness.    Patient is making good progress towards established goals.    Plan:  Continue with established Plan of Care towards PT  goals.

## 2017-04-19 ENCOUNTER — CLINICAL SUPPORT (OUTPATIENT)
Dept: REHABILITATION | Facility: HOSPITAL | Age: 67
End: 2017-04-19
Attending: PSYCHIATRY & NEUROLOGY
Payer: MEDICARE

## 2017-04-19 DIAGNOSIS — M25.561 CHRONIC PAIN OF BOTH KNEES: ICD-10-CM

## 2017-04-19 DIAGNOSIS — G89.29 CHRONIC PAIN OF BOTH KNEES: ICD-10-CM

## 2017-04-19 DIAGNOSIS — R29.6 FALLS FREQUENTLY: ICD-10-CM

## 2017-04-19 DIAGNOSIS — M25.562 CHRONIC PAIN OF BOTH KNEES: ICD-10-CM

## 2017-04-19 PROCEDURE — 97110 THERAPEUTIC EXERCISES: CPT | Mod: PN

## 2017-04-19 NOTE — PROGRESS NOTES
Name: Naomi Toledo  Clinic Number: [id number]  Date of Treatment: 04/19/2017   Diagnosis:   Encounter Diagnoses   Name Primary?    Falls frequently     Chronic pain of both knees        Time in: 902  Time Out: 955  Total Treatment Time: 53  Group Time: 0      Subjective:    Naomi reports having R flank pain which seems to be related to increase inactivity since start of PT.  Patient reports their pain to be 4/10 on a 0-10 scale with 0 being no pain and 10 being the worst pain imaginable.    Objective    Patient received individual therapy to increase ROM, flexibility, posture and core stabilization with 0 patients with activities as follows:     Naomi received therapeutic exercises to develop ROM, flexibility, posture and core stabilization for 53 minutes including:     Standing gastroc stretch 3 x 30 sec  HR x 30  SLS RL 3 x 30 sec  Mat:  PPT x 30  DKTC w green physioball x30  LTR w blue physioball  2 x 15  Seated n red physioball wt shift side to side 2'  Seated on red physioball APT/PPT 1.5 min  Seated OHP x 2'  Standing rows w 10# x 30  NuStep w UE portion 10' R 3  Declined CP after ex, to apply upon return home if knee or flank pain flairs up    Written Home Exercises Provided: LTR, dying bug  Pt demo good understanding of the education provided. Naomi demonstrated good return demonstration of activities.     Assessment:     Pt now w R flank pain, also having some discomfort B quads. Addressed core stability along w knee strengthening and joint protection.   Pt will continue to benefit from skilled PT intervention. Medical Necessity is demonstrated by:  Pain limits function of effected part for some activities, Requires skilled supervision to complete and progress HEP and Weakness.    Patient is making fair progress towards established goals.    Plan:  Continue with established Plan of Care towards PT goals.

## 2017-04-20 ENCOUNTER — TELEPHONE (OUTPATIENT)
Dept: NEUROLOGY | Facility: CLINIC | Age: 67
End: 2017-04-20

## 2017-04-20 ENCOUNTER — PATIENT MESSAGE (OUTPATIENT)
Dept: NEUROLOGY | Facility: CLINIC | Age: 67
End: 2017-04-20

## 2017-04-20 NOTE — TELEPHONE ENCOUNTER
Naomi sent the following message:    Dr. Melo, Last month I had the pneumonia vaccination, and had a full day of ill reaction to it. He would like me to have the tetanus inoculation  also. Do you see any reason, or downside to this? I can not remember the last time I had this shot, and know I am long overdue... Camila Toledo

## 2017-04-24 ENCOUNTER — CLINICAL SUPPORT (OUTPATIENT)
Dept: REHABILITATION | Facility: HOSPITAL | Age: 67
End: 2017-04-24
Attending: PSYCHIATRY & NEUROLOGY
Payer: MEDICARE

## 2017-04-24 DIAGNOSIS — G89.29 CHRONIC PAIN OF BOTH KNEES: ICD-10-CM

## 2017-04-24 DIAGNOSIS — R29.6 FALLS FREQUENTLY: ICD-10-CM

## 2017-04-24 DIAGNOSIS — M25.561 CHRONIC PAIN OF BOTH KNEES: ICD-10-CM

## 2017-04-24 DIAGNOSIS — M25.562 CHRONIC PAIN OF BOTH KNEES: ICD-10-CM

## 2017-04-24 PROCEDURE — 97110 THERAPEUTIC EXERCISES: CPT | Mod: PN

## 2017-04-24 NOTE — PROGRESS NOTES
"Name: Naomi Toledo  Clinic Number: [id number]  Date of Treatment: 04/24/2017   Diagnosis:   Encounter Diagnoses   Name Primary?    Falls frequently     Chronic pain of both knees        Time in: 900  Time Out: 959  Total Treatment Time: 59  Group Time: 0      Subjective:    Naomi reports doing well today despite amb w slight limp.  Patient reports their pain to be 0/10 on a 0-10 scale with 0 being no pain and 10 being the worst pain imaginable.    Objective    Patient received individual therapy to increase strength, endurance, ROM, posture and core stabilization with 0 patients with activities as follows:     Naomi received therapeutic exercises to develop strength, endurance, ROM, posture and core stabilization for 59 minutes including:  NuStep w UE portion 10' R 3   Standing gastroc stretch 3 x 30 sec  HR x 30  SLS  On airex R/L 3 x 30 sec  Step ups 6" box x 20 leading R then L  Mat:  PPT x 30  DKTC w blue physioball x30  LTR w blue physioball 2 x 15  SAQ 3# R/L x 10  SAQ R/L 2 x 10   Knee flexion:   11# R/L 3 x 10   22# B 3 x 10  Shoulder IR/ER w GTB x 20 R/L from standing    Dynamic balance/gait activities 4 x 30 ft:  Head turn side to side  head nod up and down  Stepping over 4" ht barrier without hesitation  Change in direction of 180*    cont w HEP  Pt demo good understanding of the education provided. Naomi demonstrated good return demonstration of activities.     Assessment:   Pt did well with dynamic gait activities reporting no dizziness and observing no hesitation or lob.  Adding shoulder and core stabilization ex w close monitoring of posture and gait.  Progressing in adding wt w knee ex. To assess next visit if this affects mobility which was reported after PT session that included use of resistance equipmnet  Pt will continue to benefit from skilled PT intervention. Medical Necessity is demonstrated by:  Pain limits function of effected part for some activities, Requires skilled supervision " to complete and progress HEP and Weakness.    Patient is making good progress towards established goals.    Plan:  Continue with established Plan of Care towards PT goals.

## 2017-04-26 ENCOUNTER — CLINICAL SUPPORT (OUTPATIENT)
Dept: REHABILITATION | Facility: HOSPITAL | Age: 67
End: 2017-04-26
Attending: PSYCHIATRY & NEUROLOGY
Payer: MEDICARE

## 2017-04-26 ENCOUNTER — PATIENT MESSAGE (OUTPATIENT)
Dept: NEUROLOGY | Facility: CLINIC | Age: 67
End: 2017-04-26

## 2017-04-26 DIAGNOSIS — G89.29 CHRONIC PAIN OF BOTH KNEES: ICD-10-CM

## 2017-04-26 DIAGNOSIS — M25.562 CHRONIC PAIN OF BOTH KNEES: ICD-10-CM

## 2017-04-26 DIAGNOSIS — M25.561 CHRONIC PAIN OF BOTH KNEES: ICD-10-CM

## 2017-04-26 DIAGNOSIS — R29.6 FALLS FREQUENTLY: ICD-10-CM

## 2017-04-26 PROCEDURE — 97110 THERAPEUTIC EXERCISES: CPT | Mod: PN

## 2017-04-26 NOTE — PROGRESS NOTES
Name: Naomi Toledo  Clinic Number: [id number]  Date of Treatment: 04/26/2017   Diagnosis:   Encounter Diagnoses   Name Primary?    Falls frequently     Chronic pain of both knees        Time in: 905  Time Out: 1000  Total Treatment Time: 55  Group Time: 0      Subjective:    Naomi reports improvement of symptoms. She finds it helpful to use physioball at home with exercises. She has a big enough ball to support seated ex and a smaller one for supine core activities. Patient reports their pain to be 0/10 on a 0-10 scale with 0 being no pain and 10 being the worst pain imaginable.    Objective    Patient received individual therapy to increase strength, endurance and ROM with 0 patients with activities as follows:     Naomi received therapeutic exercises to develop strength, endurance and ROM for 55 minutes including:     B gastroc stretch 3 x 30 sec  HR x 30  Shuttle:  75# B 3 x 10  37# 3 x 10 R/L  SAQ 3# 3 x 10 R/L  DKTC w green physioball x 30  Bridges x 15  stm w foam roller to B ITB pt in sidelying 2' R then L  Seated on red physioball:  APT/PPT x 2'  Side to side x 2'   NuStep 10' w UE R 3    Written Home Exercises Provided: cont w HEP  Pt demo good understanding of the education provided. Naomi demonstrated good return demonstration of activities.     Assessment:     Foam roller to ITB finds trigger pints along distal portion L >R  Good form with seated ball exercises.  Suggested cuff wt use w TKE as part of HEP  Pt will continue to benefit from skilled PT intervention. Medical Necessity is demonstrated by:  Pain limits function of effected part for some activities, Requires skilled supervision to complete and progress HEP and Weakness.    Patient is making good progress towards established goals.    Plan:  Continue with established Plan of Care towards PT goals.

## 2017-05-01 ENCOUNTER — CLINICAL SUPPORT (OUTPATIENT)
Dept: REHABILITATION | Facility: HOSPITAL | Age: 67
End: 2017-05-01
Attending: PSYCHIATRY & NEUROLOGY
Payer: MEDICARE

## 2017-05-01 DIAGNOSIS — R29.6 FALLS FREQUENTLY: ICD-10-CM

## 2017-05-01 DIAGNOSIS — M25.561 CHRONIC PAIN OF BOTH KNEES: ICD-10-CM

## 2017-05-01 DIAGNOSIS — G89.29 CHRONIC PAIN OF BOTH KNEES: ICD-10-CM

## 2017-05-01 DIAGNOSIS — M25.562 CHRONIC PAIN OF BOTH KNEES: ICD-10-CM

## 2017-05-01 PROCEDURE — 97110 THERAPEUTIC EXERCISES: CPT | Mod: PN

## 2017-05-01 NOTE — PROGRESS NOTES
Name: Naomi Toledo  Clinic Number: [id number]  Date of Treatment: 05/01/2017   Diagnosis:   Encounter Diagnoses   Name Primary?    Falls frequently     Chronic pain of both knees        Time in: 900  Time Out: 959  Total Treatment Time: 59  Group Time: 0      Subjective:    Naomi reports feeling well today with out knee pain.  Patient reports their pain to be 0/10 on a 0-10 scale with 0 being no pain and 10 being the worst pain imaginable.    Objective    Patient received individual therapy to increase strength, endurance, ROM and core stabilization with 0 patients with activities as follows:     Naomi received therapeutic exercises to develop strength, endurance, ROM and core stabilization for 59 minutes including:     NuStep 10' w UE L4  standing B gastroc stretch 3 x 30 sec  Standing quad stretch w strap 30 sec x 2 R/L  HR 2 x 15  SLS 3 x 30 sec R/L  Shuttle w foot supports:  75# B 3 x 10  37# 3 x 10 R/L  Seated on red physioball:  APT/PPT x 2'  Side to side x 2'   CW/CCW x 2'  Standing clocks w 2 HHA, fwd/bwd/side landing on flexed knee     Written Home Exercises Provided: seated HS stretch, quad stretch prone and standing, SLS clocks for balance and knee strengthening  Pt demo good understanding of the education provided. Naomi demonstrated good return demonstration of activities.     Assessment:     Reviewed balance ex in standing which also provides benefits to B knees .   Pt appears to be progressing well w compliance w HEP evident.  Pt will continue to benefit from skilled PT intervention. Medical Necessity is demonstrated by:  Requires skilled supervision to complete and progress HEP and Weakness.    Patient is making good progress towards established goals.      Plan:  Continue with established Plan of Care towards PT goals.

## 2017-05-03 ENCOUNTER — CLINICAL SUPPORT (OUTPATIENT)
Dept: REHABILITATION | Facility: HOSPITAL | Age: 67
End: 2017-05-03
Attending: PSYCHIATRY & NEUROLOGY
Payer: MEDICARE

## 2017-05-03 DIAGNOSIS — M25.562 CHRONIC PAIN OF BOTH KNEES: ICD-10-CM

## 2017-05-03 DIAGNOSIS — R29.6 FALLS FREQUENTLY: ICD-10-CM

## 2017-05-03 DIAGNOSIS — M25.561 CHRONIC PAIN OF BOTH KNEES: ICD-10-CM

## 2017-05-03 DIAGNOSIS — G89.29 CHRONIC PAIN OF BOTH KNEES: ICD-10-CM

## 2017-05-03 PROCEDURE — 97110 THERAPEUTIC EXERCISES: CPT | Mod: PN

## 2017-05-03 PROCEDURE — G8980 MOBILITY D/C STATUS: HCPCS | Mod: CJ,PN

## 2017-05-03 PROCEDURE — G8978 MOBILITY CURRENT STATUS: HCPCS | Mod: CJ,PN

## 2017-05-03 PROCEDURE — G8979 MOBILITY GOAL STATUS: HCPCS | Mod: CI,PN

## 2017-05-04 NOTE — PLAN OF CARE
"Name: Naomi Toledo   Clinic Number: [id number]   Age: 67 y.o.   Diagnosis:   Encounter Diagnoses   Name Primary?    Falls frequently     Chronic pain of both knees       Physician: Martha Roth MD   Original Orders : PT eval and treat  Initial visit: 4/3/17  Date of Last visit: 5/3/17  Date of Discharge Note:  5/3/17  Total Visits Received: 9    Subjective: Pt states she has been performing recommended ex at home and has two different size physioballs. PT without knee pain today. She is confident w education provided and plans to cont w ex upon d/c.    Objective: Pt very consistent w attendance, demonstrating good return understanding of ex addressed in PT. Unsure how much MS affects balance. Knee pain has resolved.  Treatment :  Completed measurements, BBS, HEP review. Provided BTB for TKE ex  To cont working on SLS, HR without HHA  amb up/down 21 steps, on TM total 6' with incline of 3 x 2' no increased knee pain reported   20 reps:  22# B flex/ext  11# R/L flex/ext    Treatment today:    Time In: 905  Time Out: 1000    Assessment:  Range of Motion/Strength:   Knee   Right     Left       AROM PROM MMT AROM PROM MMT   knee flexion prone 118   5 118   5   Knee ext 0   5 0   5   Hip flex     5     5   Hip add     5     5   Hip abd     5     5   Ankle DF     5     5   Ankle PF     5     5     Special Tests:   Lower Extremity Functional Scale:57/80=28.8% impairment  G code modifier current:CJ  G code modifier goal:CI     Elizabeth Balance Scale:55/56    Goals Achieved:  STG   1. Instruct in HEP  3. Pt pain free w 3 x 10 reps shuttle 75# B  4. Pt ascends 6" step 3 x 10 reps free of knee pain R/L  LTG  1. Pt I w HEP  4. Pt ascends /descends 21 stairs with no report of knee pain  Goals Not achieved and why:   STG  2. Pt performs 10 single leg heel raises without HHA-completes 6 reps R, 8 reps L  LT. Pt w SLS 20 sec R/L no HHA- achieved 8.6 sec R, 12.6 sec L  3. LEFS less than 20% impairment- improved 4 " points from initial score    Discharge reason : Patient has maximized benefit from PT at this time. Pt motivated to cont w recommended HEP    Discharge plan :Continue HEP and Pt to follow-up with MD as planned    Plan:  This patient is discharged from [facility].

## 2017-06-23 ENCOUNTER — PATIENT MESSAGE (OUTPATIENT)
Dept: NEUROLOGY | Facility: CLINIC | Age: 67
End: 2017-06-23

## 2017-06-23 ENCOUNTER — DOCUMENTATION ONLY (OUTPATIENT)
Dept: NEUROLOGY | Facility: CLINIC | Age: 67
End: 2017-06-23

## 2017-06-23 NOTE — TELEPHONE ENCOUNTER
----- Message from Nadeen Avelar sent at 6/23/2017  2:43 PM CDT -----  Contact: PT  Caller is requesting to schedule an appt, states they haven't been seen since 02/2017.      She can be reached at [phone].

## 2017-06-26 ENCOUNTER — HOSPITAL ENCOUNTER (OUTPATIENT)
Dept: RADIOLOGY | Facility: CLINIC | Age: 67
Discharge: HOME | End: 2017-06-26
Attending: INTERNAL MEDICINE
Payer: MEDICARE

## 2017-06-26 DIAGNOSIS — N95.9 MENOPAUSAL AND PERIMENOPAUSAL DISORDER: ICD-10-CM

## 2017-06-26 PROCEDURE — 77080 DXA BONE DENSITY AXIAL: CPT | Mod: 26,,, | Performed by: RADIOLOGY

## 2017-06-26 PROCEDURE — 77080 DXA BONE DENSITY AXIAL: CPT | Mod: TC,PO

## 2017-08-15 ENCOUNTER — HOSPITAL ENCOUNTER (OUTPATIENT)
Dept: RADIOLOGY | Facility: CLINIC | Age: 67
Discharge: HOME | End: 2017-08-15
Attending: FAMILY MEDICINE
Payer: MEDICARE

## 2017-08-15 DIAGNOSIS — Z12.31 ENCOUNTER FOR SCREENING MAMMOGRAM FOR MALIGNANT NEOPLASM OF BREAST: ICD-10-CM

## 2017-08-15 PROCEDURE — 77067 SCR MAMMO BI INCL CAD: CPT | Mod: 26,,, | Performed by: RADIOLOGY

## 2017-08-15 PROCEDURE — 77067 SCR MAMMO BI INCL CAD: CPT | Mod: TC

## 2017-08-15 PROCEDURE — 77063 BREAST TOMOSYNTHESIS BI: CPT | Mod: 26,,, | Performed by: RADIOLOGY

## 2017-08-18 ENCOUNTER — DOCUMENTATION ONLY (OUTPATIENT)
Dept: FAMILY MEDICINE | Facility: CLINIC | Age: 67
End: 2017-08-18

## 2017-08-18 NOTE — PROGRESS NOTES
Pre-Visit Chart Review  For Appointment Scheduled on 9/6/17.    Health Maintenance Due   Topic Date Due    TETANUS VACCINE  02/28/1970    Zoster Vaccine  02/28/2012    Influenza Vaccine  08/01/2017

## 2017-08-21 ENCOUNTER — LAB VISIT (OUTPATIENT)
Dept: LAB | Facility: HOSPITAL | Age: 67
End: 2017-08-21
Attending: INTERNAL MEDICINE
Payer: MEDICARE

## 2017-08-21 DIAGNOSIS — R73.9 HYPERGLYCEMIA: ICD-10-CM

## 2017-08-21 PROCEDURE — 83036 HEMOGLOBIN GLYCOSYLATED A1C: CPT

## 2017-08-21 PROCEDURE — 36415 COLL VENOUS BLD VENIPUNCTURE: CPT | Mod: PO

## 2017-08-22 LAB
ESTIMATED AVG GLUCOSE: 123 MG/DL
HBA1C MFR BLD HPLC: 5.9 %

## 2017-09-06 ENCOUNTER — LAB VISIT (OUTPATIENT)
Dept: LAB | Facility: HOSPITAL | Age: 67
End: 2017-09-06
Attending: FAMILY MEDICINE
Payer: MEDICARE

## 2017-09-06 ENCOUNTER — OFFICE VISIT (OUTPATIENT)
Dept: FAMILY MEDICINE | Facility: CLINIC | Age: 67
End: 2017-09-06
Payer: MEDICARE

## 2017-09-06 VITALS
HEIGHT: 66 IN | TEMPERATURE: 98 F | WEIGHT: 242.06 LBS | SYSTOLIC BLOOD PRESSURE: 116 MMHG | DIASTOLIC BLOOD PRESSURE: 74 MMHG | HEART RATE: 70 BPM | BODY MASS INDEX: 38.9 KG/M2

## 2017-09-06 DIAGNOSIS — E55.9 VITAMIN D DEFICIENCY: ICD-10-CM

## 2017-09-06 DIAGNOSIS — N28.9 DECREASED RENAL FUNCTION: ICD-10-CM

## 2017-09-06 DIAGNOSIS — R73.9 HYPERGLYCEMIA: Primary | ICD-10-CM

## 2017-09-06 DIAGNOSIS — Z23 IMMUNIZATION DUE: ICD-10-CM

## 2017-09-06 LAB
25(OH)D3+25(OH)D2 SERPL-MCNC: 60 NG/ML
ANION GAP SERPL CALC-SCNC: 8 MMOL/L
BUN SERPL-MCNC: 18 MG/DL
CALCIUM SERPL-MCNC: 9.8 MG/DL
CHLORIDE SERPL-SCNC: 105 MMOL/L
CO2 SERPL-SCNC: 27 MMOL/L
CREAT SERPL-MCNC: 0.8 MG/DL
EST. GFR  (AFRICAN AMERICAN): >60 ML/MIN/1.73 M^2
EST. GFR  (NON AFRICAN AMERICAN): >60 ML/MIN/1.73 M^2
GLUCOSE SERPL-MCNC: 88 MG/DL
POTASSIUM SERPL-SCNC: 4.6 MMOL/L
SODIUM SERPL-SCNC: 140 MMOL/L

## 2017-09-06 PROCEDURE — 99214 OFFICE O/P EST MOD 30 MIN: CPT | Mod: S$PBB,,, | Performed by: FAMILY MEDICINE

## 2017-09-06 PROCEDURE — 36415 COLL VENOUS BLD VENIPUNCTURE: CPT | Mod: PO

## 2017-09-06 PROCEDURE — 80048 BASIC METABOLIC PNL TOTAL CA: CPT

## 2017-09-06 PROCEDURE — 99999 PR PBB SHADOW E&M-EST. PATIENT-LVL III: CPT | Mod: PBBFAC,,, | Performed by: FAMILY MEDICINE

## 2017-09-06 PROCEDURE — 99213 OFFICE O/P EST LOW 20 MIN: CPT | Mod: PBBFAC,PO | Performed by: FAMILY MEDICINE

## 2017-09-06 PROCEDURE — 82306 VITAMIN D 25 HYDROXY: CPT

## 2017-09-06 NOTE — PROGRESS NOTES
CHIEF COMPLAINT:  Follow up      HISTORY OF PRESENT ILLNESS:  Naomi Toledo is a 67 y.o. female who presents to clinic for follow up    1. Hyperglycemia: A last  FBG was 104, Hga1c values were 5.7%, 5.9%.    2. Vitamin D deficiency: She is due for vitamin D level    3. CMP in March demonstrated slightly decreased renal function    4. She is due for a pneumovax in one month    REVIEW OF SYSTEMS:  The patient denies any fever, chills, night sweats, headaches, vision changes, difficulty speaking or swallowing, decreased hearing, weight loss, weight gain, chest pain, palpitations, shortness of breath, cough, nausea, vomiting, abdominal pain, dysuria, diarrhea, constipation, hematuria, hematochezia, melena, changes in her hair, nails, numbness or weakness in her extremities, erythema, welling over any of her joints, myalgia, swollen glands, easy bruising, fatigue, edema, symptoms of anxiety or depression.       MEDICATIONS:   Reviewed and/or reconciled in EPIC    ALLERGIES:  Reviewed and/or reconciled in [facility]    PAST MEDICAL/SURGICAL HISTORY:   Past Medical History:   Diagnosis Date    Blood transfusion     Deep vein thrombosis     MS (multiple sclerosis)     Plantar fasciitis of left foot 2/2014      Past Surgical History:   Procedure Laterality Date    OVARIAN CYST SURGERY  1974    TUBAL LIGATION      VEIN SURGERY         FAMILY HISTORY:    Family History   Problem Relation Age of Onset    Cancer Mother      breast    Breast cancer Mother 50    Hypertension Father     Cancer Sister     Hypertension Sister     Breast cancer Sister 64    Cancer Brother     Diabetes Maternal Grandmother     Leukemia Paternal Grandmother        SOCIAL HISTORY:    Social History     Social History    Marital status:      Spouse name: N/A    Number of children: N/A    Years of education: N/A     Occupational History    Not on file.     Social History Main Topics    Smoking status: Never Smoker    Smokeless  tobacco: Never Used    Alcohol use No    Drug use: Unknown    Sexual activity: Not on file     Other Topics Concern    Not on file     Social History Narrative    No narrative on file       PHYSICAL EXAM:  VITAL SIGNS:   There were no vitals filed for this visit.  GENERAL:  Patient appears well nourished, sitting on exam table, in no acute distress.  HEENT:  Atraumatic, normocephalic, PERRLA, EOMI, no conjunctival injection, sclerae are anicteric, normal external auditory canals,TMs clear b/l, gross hearing intact to whisper, MMM, no oropharygneal erythema or exudate.  NECK:  Supple, normal ROM, trachea is midline , no supraclavicular or cervical LAD or masses palpated.  Thyroid gland not palpable.  CARDIOVASCULAR:  RRR, normal S1 and S2, no m/r/g.  RESPIRATORY:  CTA b/l, no wheezes, rhonchi, rales.  No increased work of breathing, no  use of accessory muscles.  ABDOMEN:  Soft, nontender, nondistended, normoactive bowel sounds in all four quadrants, no rebound or guarding, no HSM or masses palpated.  Normal percussion.  EXTREMITIES:  2+ DP pulses b/l, no edema.  SKIN:  Warm, no lesions on exposed skin.  NEUROMUSCULAR:  No tenderness over cervical, thoracic, lumbar spinous processes, paraspinal muscles, SI joints. Normal ROM over cervical and lumbar spines.  4+/5 hip, knee, ankle flexors/extensors  b/l.  2+ patellar and achilles reflexes b/l.  Steady gait.  Patient is able to walk on heels and toes. Cranial nerves II-XII grossly intact.  No clubbing or cyanosis of digits/nails.      PSYCH:  Patient is alert and oriented to person, time, place. They are appropriately dressed and groomed. There is normal eye contact. Rate and tone of speech is normal. Normal insight, judgement. Normal thought content and process.     LABORATORY/IMAGING STUDIES: pending    ASSESSMENT/PLAN: This is a 67 y.o. female who presents to clinic for evaluation of the following concerns    1.  Hyperglycemia: continue with low sugar/carb  diet. Recheck FBG and HgA1c.  2. Vitamin D deficiency: obtain vitamin D level  3. Obesity: see below  4. Immunization due: will schedule nurse visit to administer pneumovax in one month    .Patient readiness: acceptance and barriers:none    During the course of the visit the patient was educated and counseled about the following:     Obesity:   General weight loss/lifestyle modification strategies discussed (elicit support from others; identify saboteurs; non-food rewards, etc).  Diet interventions: moderate (500 kCal/d) deficit diet.  Informal exercise measures discussed, e.g. taking stairs instead of elevator.  Regular aerobic exercise program discussed.    Goals: Obesity: Reduce calorie intake and BMI    Did patient meet goals/outcomes: No    The following self management tools provided: declined    Patient Instructions (the written plan) was given to the patient/family.     Time spent with patient: 30 minutes        Ada Hope MD

## 2017-09-15 ENCOUNTER — LAB VISIT (OUTPATIENT)
Dept: LAB | Facility: HOSPITAL | Age: 67
End: 2017-09-15
Attending: PHYSICIAN ASSISTANT
Payer: MEDICARE

## 2017-09-15 ENCOUNTER — OFFICE VISIT (OUTPATIENT)
Dept: NEUROLOGY | Facility: CLINIC | Age: 67
End: 2017-09-15
Payer: MEDICARE

## 2017-09-15 VITALS — SYSTOLIC BLOOD PRESSURE: 104 MMHG | HEART RATE: 72 BPM | DIASTOLIC BLOOD PRESSURE: 71 MMHG

## 2017-09-15 DIAGNOSIS — E55.9 VITAMIN D INSUFFICIENCY: ICD-10-CM

## 2017-09-15 DIAGNOSIS — Z71.89 COUNSELING REGARDING GOALS OF CARE: ICD-10-CM

## 2017-09-15 DIAGNOSIS — Z79.899 ENCOUNTER FOR LONG-TERM (CURRENT) USE OF HIGH-RISK MEDICATION: ICD-10-CM

## 2017-09-15 DIAGNOSIS — G35 MS (MULTIPLE SCLEROSIS): ICD-10-CM

## 2017-09-15 DIAGNOSIS — G35 MS (MULTIPLE SCLEROSIS): Primary | ICD-10-CM

## 2017-09-15 DIAGNOSIS — R20.9 SENSORY DISTURBANCE: ICD-10-CM

## 2017-09-15 LAB
ALBUMIN SERPL BCP-MCNC: 3.6 G/DL
ALP SERPL-CCNC: 77 U/L
ALT SERPL W/O P-5'-P-CCNC: 21 U/L
ANION GAP SERPL CALC-SCNC: 9 MMOL/L
AST SERPL-CCNC: 16 U/L
BASOPHILS # BLD AUTO: 0.05 K/UL
BASOPHILS NFR BLD: 0.8 %
BILIRUB SERPL-MCNC: 0.7 MG/DL
BUN SERPL-MCNC: 23 MG/DL
CALCIUM SERPL-MCNC: 9.1 MG/DL
CHLORIDE SERPL-SCNC: 106 MMOL/L
CO2 SERPL-SCNC: 27 MMOL/L
CREAT SERPL-MCNC: 0.9 MG/DL
DIFFERENTIAL METHOD: NORMAL
EOSINOPHIL # BLD AUTO: 0.2 K/UL
EOSINOPHIL NFR BLD: 3.2 %
ERYTHROCYTE [DISTWIDTH] IN BLOOD BY AUTOMATED COUNT: 13 %
EST. GFR  (AFRICAN AMERICAN): >60 ML/MIN/1.73 M^2
EST. GFR  (NON AFRICAN AMERICAN): >60 ML/MIN/1.73 M^2
GLUCOSE SERPL-MCNC: 105 MG/DL
HCT VFR BLD AUTO: 44.3 %
HGB BLD-MCNC: 15.1 G/DL
LYMPHOCYTES # BLD AUTO: 2 K/UL
LYMPHOCYTES NFR BLD: 31.8 %
MCH RBC QN AUTO: 29.8 PG
MCHC RBC AUTO-ENTMCNC: 34.1 G/DL
MCV RBC AUTO: 88 FL
MONOCYTES # BLD AUTO: 0.4 K/UL
MONOCYTES NFR BLD: 7.1 %
NEUTROPHILS # BLD AUTO: 3.5 K/UL
NEUTROPHILS NFR BLD: 56.9 %
PLATELET # BLD AUTO: 200 K/UL
PMV BLD AUTO: 10.9 FL
POTASSIUM SERPL-SCNC: 4.6 MMOL/L
PROT SERPL-MCNC: 6.9 G/DL
RBC # BLD AUTO: 5.06 M/UL
SODIUM SERPL-SCNC: 142 MMOL/L
WBC # BLD AUTO: 6.19 K/UL

## 2017-09-15 PROCEDURE — 80053 COMPREHEN METABOLIC PANEL: CPT

## 2017-09-15 PROCEDURE — 36415 COLL VENOUS BLD VENIPUNCTURE: CPT | Mod: PO

## 2017-09-15 PROCEDURE — 85025 COMPLETE CBC W/AUTO DIFF WBC: CPT

## 2017-09-15 PROCEDURE — 99214 OFFICE O/P EST MOD 30 MIN: CPT | Mod: S$PBB,,, | Performed by: PHYSICIAN ASSISTANT

## 2017-09-15 PROCEDURE — 99212 OFFICE O/P EST SF 10 MIN: CPT | Mod: PBBFAC | Performed by: PHYSICIAN ASSISTANT

## 2017-09-15 PROCEDURE — 99999 PR PBB SHADOW E&M-EST. PATIENT-LVL II: CPT | Mod: PBBFAC,,, | Performed by: PHYSICIAN ASSISTANT

## 2017-09-15 NOTE — PROGRESS NOTES
"Subjective:       Patient ID: Naomi Toledo is a 67 y.o. female who presents today with her  for a routine clinic visit for MS. She was last seen by Dr. Roth in March of 2017.     MS HPI:  · DMT: Avonex  · Side effects from DMT? No  · Taking vitamin D3 as recommended? Yes -  Dose: 5,000 IU daily(unsure which brand)  · Patient is requesting 3 month supplies of Avonex  · She states she has been more compliant with VitD3 dosing    SOCIAL HISTORY  Social History   Substance Use Topics    Smoking status: Never Smoker    Smokeless tobacco: Never Used    Alcohol use No     Living arrangements - the patient lives with her .  Employment Homemaker    MS ROS:  · Fatigue: Yes   · Sleep Disturbance: No  · Bladder Dysfunction: Yes - urgency; manages it;   · Bowel Dysfunction: Yes - urgency; manages it;   · Spasticity: No  · Visual Symptoms: No  · Cognitive: No  · Mood Disorder: No  · Gait Disturbance: No  · Falls:No   · Hand Dysfunction: Yes - fine motor issues in left hand related to sensory issues   · Pain: Yes - knee pain--but "not so bad"  · Sexual Dysfunction: Not Assessed  · Skin Breakdown: No  · Tremors: No  · Dysphagia:  No  · Dysarthria:  No  · Heat sensitivity:  No  · Any un-met adaptive needs? No  · Copay Assist?  Yes - $0           Objective:        1. 25 foot timed walk: 4.5s last visit in March without assist  Timed 25 Foot Walk: 8/19/2016 9/15/2017   Did patient wear an AFO? No No   Was assistive device used? No No   Time for 25 Foot Walk (seconds) 4.9 4.7   Time for 25 Foot Walk (seconds) 4.9 4.7     Neurologic Exam     Mental Status   Oriented to person, place, and time.   Follows 3 step commands.   Speech: speech is normal   Level of consciousness: alert  Normal comprehension.     Cranial Nerves     CN II   Visual acuity: normal with correction (20/20 OD and OS with Snellen hand held chart at 6 ft)    CN III, IV, VI   Pupils are equal, round, and reactive to light.  Extraocular motions are " normal.     CN V   Facial sensation intact.     CN VII   Facial expression full, symmetric.     CN VIII   Hearing: intact (finger rub)    CN IX, X   Palate: symmetric    CN XI   CN XI normal.     CN XII   Tongue deviation: none    Motor Exam   Muscle bulk: normal  Overall muscle tone: normal    Strength   Right deltoid: 5/5  Left deltoid: 5/5  Right triceps: 5/5  Left triceps: 5/5  Right wrist extension: 5/5  Left wrist extension: 5/5  Right interossei: 5/5  Left interossei: 5/5  Right iliopsoas: 5/5  Left iliopsoas: 5/5  Right hamstrin/5  Left hamstrin/5  Right anterior tibial: 5/5  Left anterior tibial: 5/5  Right peroneal: 5/5  Left peroneal: 5/5    Sensory Exam   Right arm vibration: decreased from fingers  Left arm vibration: decreased from fingers  Right leg vibration: decreased from toes  Left leg vibration: decreased from ankle    Gait, Coordination, and Reflexes     Gait  Gait: (antalgic in nature)    Coordination   Finger to nose coordination: normal  Tandem walking coordination: abnormal    Tremor   Resting tremor: absent  Action tremor: absent    Reflexes   Right brachioradialis: 2+  Left brachioradialis: 2+  Right biceps: 2+  Left biceps: 2+  Right triceps: 2+  Left triceps: 2+  Right patellar: 2+  Left patellar: 2+  Right achilles: 2+  Left achilles: 2+  Right plantar: normal  Left plantar: normal  Right ankle clonus: absent  Left ankle clonus: absent  Right pendular knee jerk: absent  Left pendular knee jerk: absentDifficulty with Heel/toe walk  Normal RSM         Labs:   Ordered today: CBC and CMP    Lab Results   Component Value Date    [id number] 60 2017    [id number] 42 2016    [id number] 38 2015     No results found for: JCVINDEX, JCVANTIBODY  No results found for: CP4LXLNX, ABSOLUTECD3, BI7UFWBL, ABSOLUTECD8, IG2OMMUB, ABSOLUTECD4, LABCD48    Diagnosis/Assessment/Plan:    1. Multiple Sclerosis  · Assessment: patient continues to remain clinically stable on  Avonex  · Imaging:will continue to defer MRI imaging for now as she has remained very stable  · Disease Modifying Therapies: continue Avonex and high dose Vit D3. Will check safety labs today prior to refill(requests 3 months). Discussed importance of Vit D3,  as more and more data suggests that high circulating blood levels of vitamin D has an ameliorating effect on the disease course in multiple sclerosis in general.  Recommended consistency with reliable brand such as [employer], [facility], [employer], [employer].       2. MS Symptom Assessment / Management    No changes to management made today      Over 50% of this 30 minute visit was spent in direct face to face counseling of the patient regarding her current symptoms and management of same, importance of safety labs and Vit D3. .  Follow up in 6 months with me  Patient agreed to POC today.    Attending, Dr. Roth, was available during today's encounter. Any change to plan along with cosign to appear in the EMR.     Fay Snyder PA-C  [facility]  .        MS (multiple sclerosis)  -     CBC auto differential; Future; Expected date: 09/15/2017  -     Comprehensive metabolic panel; Future; Expected date: 09/15/2017    Encounter for long-term (current) use of high-risk medication    Counseling regarding goals of care    Vitamin D insufficiency    Sensory disturbance

## 2017-09-19 ENCOUNTER — PATIENT MESSAGE (OUTPATIENT)
Dept: NEUROLOGY | Facility: CLINIC | Age: 67
End: 2017-09-19

## 2017-09-19 DIAGNOSIS — G35 MS (MULTIPLE SCLEROSIS): ICD-10-CM

## 2017-10-06 ENCOUNTER — CLINICAL SUPPORT (OUTPATIENT)
Dept: FAMILY MEDICINE | Facility: CLINIC | Age: 67
End: 2017-10-06
Payer: MEDICARE

## 2017-10-06 PROCEDURE — 90732 PPSV23 VACC 2 YRS+ SUBQ/IM: CPT | Mod: PBBFAC,PO

## 2017-10-06 NOTE — PROGRESS NOTES
Patient identified by 2 patient identified (name and ). Administered pneumonia 23 IM . Patient tolerated well. No bleeding at insertion site noted. Aseptic technique maintained.

## 2017-12-27 ENCOUNTER — PATIENT MESSAGE (OUTPATIENT)
Dept: FAMILY MEDICINE | Facility: CLINIC | Age: 67
End: 2017-12-27

## 2017-12-28 ENCOUNTER — TELEPHONE (OUTPATIENT)
Dept: FAMILY MEDICINE | Facility: CLINIC | Age: 67
End: 2017-12-28

## 2017-12-28 NOTE — TELEPHONE ENCOUNTER
"Patient is scheduled to see Dr Dorantes on tomorrow for "chest discomfort"  Advised patient to go to the ED for further evaluation.  Verbalized understanding appt will be canceled.  "

## 2018-01-01 ENCOUNTER — PATIENT MESSAGE (OUTPATIENT)
Dept: FAMILY MEDICINE | Facility: CLINIC | Age: 68
End: 2018-01-01

## 2018-01-02 ENCOUNTER — PATIENT MESSAGE (OUTPATIENT)
Dept: FAMILY MEDICINE | Facility: CLINIC | Age: 68
End: 2018-01-02

## 2018-01-09 ENCOUNTER — DOCUMENTATION ONLY (OUTPATIENT)
Dept: FAMILY MEDICINE | Facility: CLINIC | Age: 68
End: 2018-01-09

## 2018-01-09 NOTE — PROGRESS NOTES
Pre-Visit Chart Review  For Appointment Scheduled on 01/10/18    Health Maintenance Due   Topic Date Due    TETANUS VACCINE  02/28/1970    Zoster Vaccine  02/28/2012    Influenza Vaccine  08/01/2017

## 2018-01-10 ENCOUNTER — OFFICE VISIT (OUTPATIENT)
Dept: FAMILY MEDICINE | Facility: CLINIC | Age: 68
End: 2018-01-10
Payer: MEDICARE

## 2018-01-10 VITALS
TEMPERATURE: 98 F | SYSTOLIC BLOOD PRESSURE: 121 MMHG | DIASTOLIC BLOOD PRESSURE: 73 MMHG | HEIGHT: 66 IN | BODY MASS INDEX: 41.1 KG/M2 | HEART RATE: 76 BPM | WEIGHT: 255.75 LBS

## 2018-01-10 DIAGNOSIS — Z09 HOSPITAL DISCHARGE FOLLOW-UP: ICD-10-CM

## 2018-01-10 DIAGNOSIS — R07.89 CHEST DISCOMFORT: Primary | ICD-10-CM

## 2018-01-10 DIAGNOSIS — E66.01 MORBID OBESITY WITH BMI OF 40.0-44.9, ADULT: ICD-10-CM

## 2018-01-10 PROCEDURE — 99214 OFFICE O/P EST MOD 30 MIN: CPT | Mod: S$PBB,,, | Performed by: PHYSICIAN ASSISTANT

## 2018-01-10 PROCEDURE — 99999 PR PBB SHADOW E&M-EST. PATIENT-LVL III: CPT | Mod: PBBFAC,,, | Performed by: PHYSICIAN ASSISTANT

## 2018-01-10 PROCEDURE — 99213 OFFICE O/P EST LOW 20 MIN: CPT | Mod: PBBFAC,PO | Performed by: PHYSICIAN ASSISTANT

## 2018-01-10 RX ORDER — INTERFERON BETA-1A 30MCG/.5ML
KIT INTRAMUSCULAR
COMMUNITY
Start: 2017-12-05 | End: 2018-01-10 | Stop reason: SDUPTHER

## 2018-01-10 NOTE — PROGRESS NOTES
"Subjective:       Patient ID: Naomi Toledo is a 67 y.o. female.    Chief Complaint: Hospital Follow Up    HPI   Patient is a 67 year old  female presenting to the clinic for hospital f/u from [facility] from 12/29/17-12/30/17 for CP. Patient was admitted for ACS r/o. Labs showed negative troponins. She also had negative EKG & stress test, u/s bilateral legs, and CTA while admitted. Patient did have have 2 very small pulmonary nodules one a 3mm and the other one smaller. She denies any history of smoking or exposure to second hand smoke. She was instructed to give a trial of prilosec or follow-up with GI for possible GERD etiology.    Patient reports that she has not noticed any CP since hospitalization. She feels like she was "having too much fun at [employer]- drinking diet soda & eating rich food." She denies any reflux, heart burn, indigestion. She will give prilosec a trial if this reoccurs.     Review of Systems   Constitutional: Negative for activity change, appetite change, chills, diaphoresis, fatigue and fever.   HENT: Negative for congestion, postnasal drip and rhinorrhea.    Respiratory: Negative.  Negative for cough, shortness of breath and wheezing.    Cardiovascular: Negative.  Negative for chest pain, palpitations and leg swelling.   Gastrointestinal: Negative for abdominal pain, blood in stool, constipation, diarrhea, nausea and vomiting.   Genitourinary: Negative for dysuria, frequency, hematuria and urgency.   Musculoskeletal: Negative.    Skin: Negative.  Negative for color change and rash.   Neurological: Negative for dizziness and syncope.   Psychiatric/Behavioral: Negative for agitation, behavioral problems and confusion.       Objective:      Physical Exam   Constitutional: Vital signs are normal. She appears well-developed and well-nourished. No distress.   Cardiovascular: Normal rate, regular rhythm, S1 normal, S2 normal and normal heart sounds.  Exam reveals no gallop.    No murmur " heard.  Pulses:       Radial pulses are 2+ on the right side, and 2+ on the left side.   <2sec cap refill fingers bilat     Pulmonary/Chest: Effort normal and breath sounds normal. No respiratory distress. She has no wheezes. She has no rhonchi.   Abdominal: Soft. Normal appearance and bowel sounds are normal. There is no hepatosplenomegaly. There is no tenderness. There is no rigidity, no guarding, no tenderness at McBurney's point and negative Yarbrough's sign.   Skin: Skin is warm and dry. She is not diaphoretic.   Psychiatric: She has a normal mood and affect. Her speech is normal and behavior is normal. Judgment and thought content normal. Cognition and memory are normal.       Assessment:       1. Chest discomfort    2. Hospital discharge follow-up    3. Morbid obesity with BMI of 40.0-44.9, adult        Plan:   Naomi was seen today for hospital follow up.    Diagnoses and all orders for this visit:    Chest discomfort  Resolved; recommended trial of PPI if reoccurs    Hospital discharge follow-up  Discharge summary from [facility] reviewed & scanned to our system.    Morbid obesity with BMI of 40.0-44.9, adult  Patient readiness: acceptance and barriers:none    During the course of the visit the patient was educated and counseled about the following:     Obesity:   Regular aerobic exercise program discussed.    Goals: Obesity: Reduce calorie intake and BMI    Did patient meet goals/outcomes: No    The following self management tools provided: declined    Patient Instructions (the written plan) was given to the patient/family.     Time spent with patient: 25 minutes    Barriers to medications present (no )    Adverse reactions to current medications (no)    Over the counter medications reviewed (Yes)

## 2018-01-23 ENCOUNTER — TELEPHONE (OUTPATIENT)
Dept: NEUROLOGY | Facility: CLINIC | Age: 68
End: 2018-01-23

## 2018-01-23 ENCOUNTER — PATIENT MESSAGE (OUTPATIENT)
Dept: NEUROLOGY | Facility: CLINIC | Age: 68
End: 2018-01-23

## 2018-01-23 NOTE — TELEPHONE ENCOUNTER
----- Message from Shahla Hackett sent at 1/23/2018  8:42 AM CST -----  Contact: Pt [phone]  Pt states she is calling to schedule her follow up visit.

## 2018-02-27 DIAGNOSIS — G35 MULTIPLE SCLEROSIS: Primary | ICD-10-CM

## 2018-02-27 DIAGNOSIS — G35 MS (MULTIPLE SCLEROSIS): ICD-10-CM

## 2018-02-27 NOTE — TELEPHONE ENCOUNTER
----- Message from Daly Garcia sent at 2/27/2018  8:31 AM CST -----  Contact: self  x_  1st Request  _  2nd Request  _  3rd Request    Who: pt    Why: pt needs AVONEX 30 mcg/0.5 mL PnKt  Refilled..please advise    What Number to Call Back: [phone]    When to Expect a call back: (Before the end of the day)   -- if call after 3:00 call back will be tomorrow.

## 2018-02-28 ENCOUNTER — LAB VISIT (OUTPATIENT)
Dept: LAB | Facility: HOSPITAL | Age: 68
End: 2018-02-28
Attending: PHYSICIAN ASSISTANT
Payer: MEDICARE

## 2018-02-28 DIAGNOSIS — G35 MULTIPLE SCLEROSIS: ICD-10-CM

## 2018-02-28 LAB
ALBUMIN SERPL BCP-MCNC: 3.6 G/DL
ALP SERPL-CCNC: 82 U/L
ALT SERPL W/O P-5'-P-CCNC: 23 U/L
AST SERPL-CCNC: 18 U/L
BASOPHILS # BLD AUTO: 0.03 K/UL
BASOPHILS NFR BLD: 0.6 %
BILIRUB DIRECT SERPL-MCNC: 0.3 MG/DL
BILIRUB SERPL-MCNC: 0.9 MG/DL
DIFFERENTIAL METHOD: NORMAL
EOSINOPHIL # BLD AUTO: 0.1 K/UL
EOSINOPHIL NFR BLD: 2 %
ERYTHROCYTE [DISTWIDTH] IN BLOOD BY AUTOMATED COUNT: 12.2 %
HCT VFR BLD AUTO: 44.4 %
HGB BLD-MCNC: 15 G/DL
IMM GRANULOCYTES # BLD AUTO: 0.01 K/UL
IMM GRANULOCYTES NFR BLD AUTO: 0.2 %
LYMPHOCYTES # BLD AUTO: 1.4 K/UL
LYMPHOCYTES NFR BLD: 25 %
MCH RBC QN AUTO: 29.8 PG
MCHC RBC AUTO-ENTMCNC: 33.8 G/DL
MCV RBC AUTO: 88 FL
MONOCYTES # BLD AUTO: 0.5 K/UL
MONOCYTES NFR BLD: 9.6 %
NEUTROPHILS # BLD AUTO: 3.4 K/UL
NEUTROPHILS NFR BLD: 62.6 %
NRBC BLD-RTO: 0 /100 WBC
PLATELET # BLD AUTO: 193 K/UL
PMV BLD AUTO: 11.2 FL
PROT SERPL-MCNC: 6.9 G/DL
RBC # BLD AUTO: 5.03 M/UL
WBC # BLD AUTO: 5.4 K/UL

## 2018-02-28 PROCEDURE — 36415 COLL VENOUS BLD VENIPUNCTURE: CPT | Mod: PO

## 2018-02-28 PROCEDURE — 80076 HEPATIC FUNCTION PANEL: CPT

## 2018-02-28 PROCEDURE — 85025 COMPLETE CBC W/AUTO DIFF WBC: CPT

## 2018-03-06 ENCOUNTER — DOCUMENTATION ONLY (OUTPATIENT)
Dept: FAMILY MEDICINE | Facility: CLINIC | Age: 68
End: 2018-03-06

## 2018-03-06 NOTE — PROGRESS NOTES
Pre-Visit Chart Review  For Appointment Scheduled on 3/8/18.    Health Maintenance Due   Topic Date Due    TETANUS VACCINE  02/28/1970    Zoster Vaccine  02/28/2012    Influenza Vaccine  08/01/2017

## 2018-03-08 ENCOUNTER — LAB VISIT (OUTPATIENT)
Dept: LAB | Facility: HOSPITAL | Age: 68
End: 2018-03-08
Attending: FAMILY MEDICINE
Payer: MEDICARE

## 2018-03-08 ENCOUNTER — OFFICE VISIT (OUTPATIENT)
Dept: FAMILY MEDICINE | Facility: CLINIC | Age: 68
End: 2018-03-08
Payer: MEDICARE

## 2018-03-08 VITALS
TEMPERATURE: 98 F | SYSTOLIC BLOOD PRESSURE: 108 MMHG | BODY MASS INDEX: 39.4 KG/M2 | HEART RATE: 73 BPM | HEIGHT: 66 IN | DIASTOLIC BLOOD PRESSURE: 65 MMHG | WEIGHT: 245.13 LBS

## 2018-03-08 DIAGNOSIS — R73.9 HYPERGLYCEMIA: ICD-10-CM

## 2018-03-08 DIAGNOSIS — E66.01 SEVERE OBESITY (BMI 35.0-39.9): ICD-10-CM

## 2018-03-08 DIAGNOSIS — E55.9 VITAMIN D DEFICIENCY: ICD-10-CM

## 2018-03-08 DIAGNOSIS — G35 MS (MULTIPLE SCLEROSIS): ICD-10-CM

## 2018-03-08 DIAGNOSIS — R73.9 HYPERGLYCEMIA: Primary | ICD-10-CM

## 2018-03-08 LAB
25(OH)D3+25(OH)D2 SERPL-MCNC: 64 NG/ML
ESTIMATED AVG GLUCOSE: 117 MG/DL
GLUCOSE SERPL-MCNC: 95 MG/DL
HBA1C MFR BLD HPLC: 5.7 %

## 2018-03-08 PROCEDURE — 99214 OFFICE O/P EST MOD 30 MIN: CPT | Mod: S$PBB,,, | Performed by: FAMILY MEDICINE

## 2018-03-08 PROCEDURE — 99999 PR PBB SHADOW E&M-EST. PATIENT-LVL III: CPT | Mod: PBBFAC,,, | Performed by: FAMILY MEDICINE

## 2018-03-08 PROCEDURE — 83036 HEMOGLOBIN GLYCOSYLATED A1C: CPT

## 2018-03-08 PROCEDURE — 99213 OFFICE O/P EST LOW 20 MIN: CPT | Mod: PBBFAC,PO | Performed by: FAMILY MEDICINE

## 2018-03-08 PROCEDURE — 82306 VITAMIN D 25 HYDROXY: CPT

## 2018-03-08 PROCEDURE — 36415 COLL VENOUS BLD VENIPUNCTURE: CPT | Mod: PO

## 2018-03-08 PROCEDURE — 82947 ASSAY GLUCOSE BLOOD QUANT: CPT

## 2018-03-08 NOTE — PROGRESS NOTES
CHIEF COMPLAINT:  Follow up      HISTORY OF PRESENT ILLNESS:  Naomi Toledo is a 68 y.o. female who presents to clinic for follow up    1. Hyperglycemia: A last  FBG was 105, Hga1c values were 5.7%, 5.9%.  She has been eating a ketogenic diet and working on weight loss.     2. Vitamin D deficiency: She is due for vitamin D level      REVIEW OF SYSTEMS:  The patient denies any fever, chills, night sweats, headaches, vision changes, difficulty speaking or swallowing, decreased hearing, weight loss, weight gain, chest pain, palpitations, shortness of breath, cough, nausea, vomiting, abdominal pain, dysuria, diarrhea, constipation, hematuria, hematochezia, melena, changes in her hair, nails, numbness or weakness in her extremities, erythema, welling over any of her joints, myalgia, swollen glands, easy bruising, fatigue, edema, symptoms of anxiety or depression.       MEDICATIONS:   Reviewed and/or reconciled in EPIC    ALLERGIES:  Reviewed and/or reconciled in EPIC    PAST MEDICAL/SURGICAL HISTORY:   Past Medical History:   Diagnosis Date    Blood transfusion     Deep vein thrombosis     MS (multiple sclerosis)     Plantar fasciitis of left foot 2/2014      Past Surgical History:   Procedure Laterality Date    OVARIAN CYST SURGERY  1974    TUBAL LIGATION      VEIN SURGERY         FAMILY HISTORY:    Family History   Problem Relation Age of Onset    Cancer Mother      breast    Breast cancer Mother 50    Hypertension Father     Cancer Sister     Hypertension Sister     Breast cancer Sister 64    Cancer Brother     Diabetes Maternal Grandmother     Leukemia Paternal Grandmother        SOCIAL HISTORY:    Social History     Social History    Marital status:      Spouse name: N/A    Number of children: N/A    Years of education: N/A     Occupational History    Not on file.     Social History Main Topics    Smoking status: Never Smoker    Smokeless tobacco: Never Used    Alcohol use No     Drug use: No    Sexual activity: Yes     Partners: Male     Other Topics Concern    Not on file     Social History Narrative    No narrative on file       PHYSICAL EXAM:  VITAL SIGNS:   Vitals:    03/08/18 0951   Weight: 111.2 kg (245 lb 2.4 oz)     GENERAL:  Patient appears well nourished, sitting on exam table, in no acute distress.  HEENT:  Atraumatic, normocephalic, PERRLA, EOMI, no conjunctival injection, sclerae are anicteric, normal external auditory canals,TMs clear b/l, gross hearing intact to whisper, MMM, no oropharygneal erythema or exudate.  NECK:  Supple, normal ROM, trachea is midline , no supraclavicular or cervical LAD or masses palpated.  Thyroid gland not palpable.  CARDIOVASCULAR:  RRR, normal S1 and S2, no m/r/g.  RESPIRATORY:  CTA b/l, no wheezes, rhonchi, rales.  No increased work of breathing, no  use of accessory muscles.  ABDOMEN:  Soft, nontender, nondistended, normoactive bowel sounds in all four quadrants, no rebound or guarding, no HSM or masses palpated.  Normal percussion.  EXTREMITIES:  2+ DP pulses b/l, no edema.  SKIN:  Warm, no lesions on exposed skin.  NEUROMUSCULAR:      Steady gait.  Cranial nerves II-XII grossly intact.  No clubbing or cyanosis of digits/nails.      PSYCH:  Patient is alert and oriented to person, time, place. They are appropriately dressed and groomed. There is normal eye contact. Rate and tone of speech is normal. Normal insight, judgement. Normal thought content and process.     LABORATORY/IMAGING STUDIES: pending    ASSESSMENT/PLAN: This is a 68 y.o. female who presents to clinic for evaluation of the following concerns    1.  Hyperglycemia: continue with getogenic diet Recheck FBG and HgA1c.  2. Vitamin D deficiency: obtain vitamin D level  3. Obesity: see below  4. MS: follow up with neurology    .Patient readiness: acceptance and barriers:none    During the course of the visit the patient was educated and counseled about the following:     Obesity:    General weight loss/lifestyle modification strategies discussed (elicit support from others; identify saboteurs; non-food rewards, etc).  Diet interventions: moderate (500 kCal/d) deficit diet.  Informal exercise measures discussed, e.g. taking stairs instead of elevator.  Regular aerobic exercise program discussed.  Continue with ketogenic diet    Goals: Obesity: Reduce calorie intake and BMI    Did patient meet goals/outcomes: No    The following self management tools provided: declined    Patient Instructions (the written plan) was given to the patient/family.     Time spent with patient: 30 minutes        Ada Hope MD

## 2018-03-15 ENCOUNTER — OFFICE VISIT (OUTPATIENT)
Dept: NEUROLOGY | Facility: CLINIC | Age: 68
End: 2018-03-15
Payer: MEDICARE

## 2018-03-15 VITALS
HEART RATE: 75 BPM | DIASTOLIC BLOOD PRESSURE: 74 MMHG | SYSTOLIC BLOOD PRESSURE: 109 MMHG | HEIGHT: 66 IN | BODY MASS INDEX: 38.97 KG/M2 | WEIGHT: 242.5 LBS

## 2018-03-15 DIAGNOSIS — Z71.89 COUNSELING REGARDING GOALS OF CARE: ICD-10-CM

## 2018-03-15 DIAGNOSIS — N31.9 NEUROGENIC BLADDER: ICD-10-CM

## 2018-03-15 DIAGNOSIS — R44.9 SENSORY DEFICIT, BILATERAL: ICD-10-CM

## 2018-03-15 DIAGNOSIS — Z79.899 ENCOUNTER FOR LONG-TERM (CURRENT) USE OF HIGH-RISK MEDICATION: ICD-10-CM

## 2018-03-15 DIAGNOSIS — G35 MULTIPLE SCLEROSIS: Primary | ICD-10-CM

## 2018-03-15 PROCEDURE — 99213 OFFICE O/P EST LOW 20 MIN: CPT | Mod: PBBFAC | Performed by: PHYSICIAN ASSISTANT

## 2018-03-15 PROCEDURE — 99999 PR PBB SHADOW E&M-EST. PATIENT-LVL III: CPT | Mod: PBBFAC,,, | Performed by: PHYSICIAN ASSISTANT

## 2018-03-15 PROCEDURE — 99214 OFFICE O/P EST MOD 30 MIN: CPT | Mod: S$PBB,,, | Performed by: PHYSICIAN ASSISTANT

## 2018-03-15 RX ORDER — INTERFERON BETA-1A 30MCG/.5ML
KIT INTRAMUSCULAR
COMMUNITY
Start: 2018-03-01 | End: 2018-08-27 | Stop reason: SDUPTHER

## 2018-03-15 NOTE — PROGRESS NOTES
"Subjective:       Patient ID: Naomi Toledo is a 68 y.o. female who presents today with her  for a routine clinic visit for MS.      MS HPI:  · DMT: Avonex  · Side effects from DMT? No  · Taking vitamin D3 as recommended? Yes - 5,000IU/d   · Has changed diet to low carb moderate protein in the last two weeks. She feels this has been beneficial and has started to lose some weight  · "I was thinking of cancelling my visit today because I'm doing fine"    SOCIAL HISTORY  Social History   Substance Use Topics    Smoking status: Never Smoker    Smokeless tobacco: Never Used    Alcohol use No     Living arrangements - the patient lives with her   Employment homemaker    MS ROS:  · Fatigue: Yes -"not as bad as it use to be"  · Sleep Disturbance: No  · Bladder Dysfunction: Yes - urgency; manages it; wears a pad  · Bowel Dysfunction: Yes - urgency; manages it;   · Spasticity: No  · Visual Symptoms: No  · Cognitive: No  · Mood Disorder: No  · Gait Disturbance: No  · Falls:No    · Hand Dysfunction: Yes - fine motor issues in left hand related to sensory issues -seems like it may be worse (R hand dominant)  · Pain: Yes - knee pain--but "not so bad"  · Sexual Dysfunction: Not Assessed  · Skin Breakdown: No  · Tremors: No  · Dysphagia:  No  · Dysarthria:  No  · Heat sensitivity:  No  · Any un-met adaptive needs? Yes, difficulty with buttons   · Copay Assist?  Yes - $0        Objective:        1. 25 foot timed walk:4.7s today without assist  Timed 25 Foot Walk: 8/19/2016 9/15/2017   Did patient wear an AFO? No No   Was assistive device used? No No   Time for 25 Foot Walk (seconds) 4.9 4.7   Time for 25 Foot Walk (seconds) 4.9 4.7   Neurologic Exam     Mental Status   Oriented to person, place, and time.   Follows 3 step commands.   Speech: speech is normal   Level of consciousness: alert  Normal comprehension.     Cranial Nerves     CN II   Visual acuity: normal with correction (20/20 OD and OS with Snellen " hand held chart at 6 ft)    CN III, IV, VI   Pupils are equal, round, and reactive to light.  Extraocular motions are normal.     CN V   Facial sensation intact.     CN VII   Facial expression full, symmetric.     CN VIII   Hearing: intact (finger rub)    CN IX, X   Palate: symmetric    CN XI   CN XI normal.     CN XII   Tongue deviation: none    Motor Exam   Muscle bulk: normal  Overall muscle tone: normal    Strength   Right deltoid: 5/5  Left deltoid: 5/5  Right triceps: 5/5  Left triceps: 5/5  Right wrist extension: 5/5  Left wrist extension: 5/5  Right interossei: 5/5  Left interossei: 5/5  Right iliopsoas: 5/5  Left iliopsoas: 5/5  Right hamstrin/5  Left hamstrin/5  Right anterior tibial: 5/5  Left anterior tibial: 5/5  Right peroneal: 5/5  Left peroneal: 5/5    Sensory Exam   Right arm vibration: decreased from fingers  Left arm vibration: decreased from fingers  Right leg vibration: decreased from toes  Left leg vibration: decreased from toes    Gait, Coordination, and Reflexes     Gait  Gait: (antalgic in nature)    Coordination   Finger to nose coordination: normal  Tandem walking coordination: abnormal    Tremor   Resting tremor: absent  Action tremor: absent    Reflexes   Right brachioradialis: 2+  Left brachioradialis: 2+  Right biceps: 2+  Left biceps: 2+  Right triceps: 2+  Left triceps: 2+  Right patellar: 2+  Left patellar: 2+  Right achilles: 2+  Left achilles: 2+  Right plantar: normal  Left plantar: normal  Right ankle clonus: absent  Left ankle clonus: absent  Right pendular knee jerk: absent  Left pendular knee jerk: absentDifficulty with Heel/toe walk  Impaired L hand RSM         Imaging:     Results for orders placed during the hospital encounter of 07/09/15   MRI Brain W WO Contrast    Impression      White matter lesions in a pattern typical of multiple sclerosis.    No evidence for active demyelination.    No change relative to prior.      Electronically signed by: Sharif Palacios  MD  Date:     07/10/15  Time:    14:43      Results for orders placed during the hospital encounter of 05/16/14   MRI Cervical Spine W WO Cont    Impression  Two focal multiple sclerosis plaques in the cervical spinal cord, one sitting behind C3-4 and one sitting behind C5 are noted.  Gadolinium enhancement is not seen.        Electronically signed by: Sven Hernandez MD  Date:     05/16/14  Time:    11:12      No results found for this or any previous visit.      Labs:     Lab Results   Component Value Date    GNLEWCVJ62PW 64 03/08/2018    QEARRDLQ59JM 60 09/06/2017    VRCGAIKB42GL 42 08/11/2016     No results found for: JCVINDEX, JCVANTIBODY  No results found for: NW1REDTU, ABSOLUTECD3, RG2OGKUY, ABSOLUTECD8, WO1GJHGR, ABSOLUTECD4, LABCD48  Lab Results   Component Value Date    WBC 5.40 02/28/2018    RBC 5.03 02/28/2018    HGB 15.0 02/28/2018    HCT 44.4 02/28/2018    MCV 88 02/28/2018    MCH 29.8 02/28/2018    MCHC 33.8 02/28/2018    RDW 12.2 02/28/2018     02/28/2018    MPV 11.2 02/28/2018    GRAN 3.4 02/28/2018    GRAN 62.6 02/28/2018    LYMPH 1.4 02/28/2018    LYMPH 25.0 02/28/2018    MONO 0.5 02/28/2018    MONO 9.6 02/28/2018    EOS 0.1 02/28/2018    BASO 0.03 02/28/2018    EOSINOPHIL 2.0 02/28/2018    BASOPHIL 0.6 02/28/2018     Sodium   Date Value Ref Range Status   09/15/2017 142 136 - 145 mmol/L Final     Potassium   Date Value Ref Range Status   09/15/2017 4.6 3.5 - 5.1 mmol/L Final     Chloride   Date Value Ref Range Status   09/15/2017 106 95 - 110 mmol/L Final     CO2   Date Value Ref Range Status   09/15/2017 27 23 - 29 mmol/L Final     Glucose   Date Value Ref Range Status   09/15/2017 105 70 - 110 mg/dL Final     BUN, Bld   Date Value Ref Range Status   09/15/2017 23 8 - 23 mg/dL Final     Creatinine   Date Value Ref Range Status   09/15/2017 0.9 0.5 - 1.4 mg/dL Final     Calcium   Date Value Ref Range Status   09/15/2017 9.1 8.7 - 10.5 mg/dL Final     Total Protein   Date Value Ref Range  Status   02/28/2018 6.9 6.0 - 8.4 g/dL Final     Albumin   Date Value Ref Range Status   02/28/2018 3.6 3.5 - 5.2 g/dL Final     Total Bilirubin   Date Value Ref Range Status   02/28/2018 0.9 0.1 - 1.0 mg/dL Final     Comment:     For infants and newborns, interpretation of results should be based  on gestational age, weight and in agreement with clinical  observations.  Premature Infant recommended reference ranges:  Up to 24 hours.............<8.0 mg/dL  Up to 48 hours............<12.0 mg/dL  3-5 days..................<15.0 mg/dL  6-29 days.................<15.0 mg/dL       Alkaline Phosphatase   Date Value Ref Range Status   02/28/2018 82 55 - 135 U/L Final     AST   Date Value Ref Range Status   02/28/2018 18 10 - 40 U/L Final     ALT   Date Value Ref Range Status   02/28/2018 23 10 - 44 U/L Final     Anion Gap   Date Value Ref Range Status   09/15/2017 9 8 - 16 mmol/L Final     eGFR if    Date Value Ref Range Status   09/15/2017 >60.0 >60 mL/min/1.73 m^2 Final     eGFR if non    Date Value Ref Range Status   09/15/2017 >60.0 >60 mL/min/1.73 m^2 Final     Comment:     Calculation used to obtain the estimated glomerular filtration  rate (eGFR) is the CKD-EPI equation. Since race is unknown   in our information system, the eGFR values for   -American and Non--American patients are given   for each creatinine result.         Diagnosis/Assessment/Plan:    1. Multiple Sclerosis  · Assessment: Patient remains clinically stable on Avonex  · Imaging: will consider late in 2018  · Disease Modifying Therapies: Continue Avonex and high dose Vit D3. Recent labs normal, and Vit D3 mid-normal range    2. MS Symptom Assessment / Management  · Bladder Dysfunction: suggested timed voiding  · Adaptive needs: suggested button hook       Over 50% of this 30 minute visit was spent in direct face to face counseling of the patient about MS, DMT considerations, and MS symptom management.      Follow-up in about 6 months (around 9/15/2018) for follow up with Dr. Roth.  Patient agreed to POC today.    Attending, Dr. Roth, was available during today's encounter. Any change to plan along with cosign to appear in the EMR.     Fay Snyder PA-C  [facility]      Multiple sclerosis    Counseling regarding goals of care    Encounter for long-term (current) use of high-risk medication    Sensory deficit, bilateral    Neurogenic bladder

## 2018-05-02 ENCOUNTER — PATIENT MESSAGE (OUTPATIENT)
Dept: NEUROLOGY | Facility: CLINIC | Age: 68
End: 2018-05-02

## 2018-06-01 ENCOUNTER — TELEPHONE (OUTPATIENT)
Dept: NEUROLOGY | Facility: CLINIC | Age: 68
End: 2018-06-01

## 2018-06-01 NOTE — TELEPHONE ENCOUNTER
----- Message from Elia Heck sent at 6/1/2018 10:59 AM CDT -----  Needs Advice    Reason for call:  Pt is calling to reschedule appt on 09/21 to a week earlier or two weeks later  Communication Preference: [facility]  Additional Information:

## 2018-08-20 ENCOUNTER — PATIENT MESSAGE (OUTPATIENT)
Dept: NEUROLOGY | Facility: CLINIC | Age: 68
End: 2018-08-20

## 2018-08-20 DIAGNOSIS — G35 MULTIPLE SCLEROSIS: Primary | ICD-10-CM

## 2018-08-20 DIAGNOSIS — Z79.899 HIGH RISK MEDICATION USE: ICD-10-CM

## 2018-08-21 ENCOUNTER — LAB VISIT (OUTPATIENT)
Dept: LAB | Facility: HOSPITAL | Age: 68
End: 2018-08-21
Attending: PHYSICIAN ASSISTANT
Payer: MEDICARE

## 2018-08-21 DIAGNOSIS — G35 MULTIPLE SCLEROSIS: ICD-10-CM

## 2018-08-21 DIAGNOSIS — Z79.899 HIGH RISK MEDICATION USE: ICD-10-CM

## 2018-08-21 LAB
ALBUMIN SERPL BCP-MCNC: 3.8 G/DL
ALP SERPL-CCNC: 74 U/L
ALT SERPL W/O P-5'-P-CCNC: 16 U/L
AST SERPL-CCNC: 16 U/L
BASOPHILS # BLD AUTO: 0.04 K/UL
BASOPHILS NFR BLD: 0.7 %
BILIRUB DIRECT SERPL-MCNC: 0.3 MG/DL
BILIRUB SERPL-MCNC: 0.7 MG/DL
DIFFERENTIAL METHOD: NORMAL
EOSINOPHIL # BLD AUTO: 0.2 K/UL
EOSINOPHIL NFR BLD: 2.8 %
ERYTHROCYTE [DISTWIDTH] IN BLOOD BY AUTOMATED COUNT: 11.9 %
HCT VFR BLD AUTO: 41.7 %
HGB BLD-MCNC: 13.9 G/DL
IMM GRANULOCYTES # BLD AUTO: 0.01 K/UL
IMM GRANULOCYTES NFR BLD AUTO: 0.2 %
LYMPHOCYTES # BLD AUTO: 1.5 K/UL
LYMPHOCYTES NFR BLD: 28.3 %
MCH RBC QN AUTO: 30.3 PG
MCHC RBC AUTO-ENTMCNC: 33.3 G/DL
MCV RBC AUTO: 91 FL
MONOCYTES # BLD AUTO: 0.6 K/UL
MONOCYTES NFR BLD: 11 %
NEUTROPHILS # BLD AUTO: 3.1 K/UL
NEUTROPHILS NFR BLD: 57 %
NRBC BLD-RTO: 0 /100 WBC
PLATELET # BLD AUTO: 204 K/UL
PMV BLD AUTO: 11 FL
PROT SERPL-MCNC: 6.8 G/DL
RBC # BLD AUTO: 4.58 M/UL
WBC # BLD AUTO: 5.38 K/UL

## 2018-08-21 PROCEDURE — 80076 HEPATIC FUNCTION PANEL: CPT

## 2018-08-21 PROCEDURE — 85025 COMPLETE CBC W/AUTO DIFF WBC: CPT

## 2018-08-21 PROCEDURE — 36415 COLL VENOUS BLD VENIPUNCTURE: CPT | Mod: PO

## 2018-08-27 DIAGNOSIS — G35 MULTIPLE SCLEROSIS: Primary | ICD-10-CM

## 2018-08-28 ENCOUNTER — HOSPITAL ENCOUNTER (OUTPATIENT)
Dept: RADIOLOGY | Facility: CLINIC | Age: 68
Discharge: HOME | End: 2018-08-28
Attending: FAMILY MEDICINE
Payer: MEDICARE

## 2018-08-28 DIAGNOSIS — Z12.39 ENCOUNTER FOR SPECIAL SCREENING EXAMINATION FOR NEOPLASM OF BREAST: ICD-10-CM

## 2018-08-28 PROCEDURE — 77067 SCR MAMMO BI INCL CAD: CPT | Mod: 26,,, | Performed by: RADIOLOGY

## 2018-08-28 PROCEDURE — 77067 SCR MAMMO BI INCL CAD: CPT | Mod: TC,PO

## 2018-08-28 PROCEDURE — 77063 BREAST TOMOSYNTHESIS BI: CPT | Mod: 26,,, | Performed by: RADIOLOGY

## 2018-08-28 RX ORDER — INTERFERON BETA-1A 30MCG/.5ML
30 KIT INTRAMUSCULAR
Qty: 3 KIT | Refills: 1 | Status: SHIPPED | OUTPATIENT
Start: 2018-08-28 | End: 2019-01-28 | Stop reason: SDUPTHER

## 2018-09-13 ENCOUNTER — PES CALL (OUTPATIENT)
Dept: ADMINISTRATIVE | Facility: CLINIC | Age: 68
End: 2018-09-13

## 2018-10-16 ENCOUNTER — OFFICE VISIT (OUTPATIENT)
Dept: NEUROLOGY | Facility: CLINIC | Age: 68
End: 2018-10-16
Payer: MEDICARE

## 2018-10-16 VITALS
WEIGHT: 250.44 LBS | SYSTOLIC BLOOD PRESSURE: 142 MMHG | HEART RATE: 73 BPM | DIASTOLIC BLOOD PRESSURE: 81 MMHG | BODY MASS INDEX: 40.25 KG/M2 | HEIGHT: 66 IN

## 2018-10-16 DIAGNOSIS — Z71.89 COUNSELING REGARDING GOALS OF CARE: ICD-10-CM

## 2018-10-16 DIAGNOSIS — R44.9 SENSORY DEFICIT, BILATERAL: ICD-10-CM

## 2018-10-16 DIAGNOSIS — Z29.89 PROPHYLACTIC IMMUNOTHERAPY: ICD-10-CM

## 2018-10-16 DIAGNOSIS — G35 MS (MULTIPLE SCLEROSIS): Primary | ICD-10-CM

## 2018-10-16 PROCEDURE — 99999 PR PBB SHADOW E&M-EST. PATIENT-LVL III: CPT | Mod: PBBFAC,,, | Performed by: PSYCHIATRY & NEUROLOGY

## 2018-10-16 PROCEDURE — 99215 OFFICE O/P EST HI 40 MIN: CPT | Mod: S$PBB,,, | Performed by: PSYCHIATRY & NEUROLOGY

## 2018-10-16 PROCEDURE — 99213 OFFICE O/P EST LOW 20 MIN: CPT | Mod: PBBFAC | Performed by: PSYCHIATRY & NEUROLOGY

## 2018-10-16 NOTE — PROGRESS NOTES
"Subjective:       Patient ID: Naomi Toledo is a 68 y.o. female who presents today for a routine clinic visit for MS.  Pt is accompanied by her ;     MS HPI:  · DMT: Avonex--past 4 years;   · Side effects from DMT? Flu like sx managed well with one Naproxen per week;   · Taking vitamin D3 as recommended? 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    MS ROS:  · Fatigue: Yes - endurance is not what it used to be  · Sleep Disturbance: No  · Bladder Dysfunction: Yes - urgency; has to wear a pad;   · Bowel Dysfunction: "mostly good"; occasional urgency/incontinence every other months;   · Spasticity: No  · Visual Symptoms: No  · Cognitive: Yes - some word finding at times;  not concerned about her memory;    · Mood Disorder: No  · Gait Disturbance: No  · Falls: No  · Hand Dysfunction: Yes - as in HPI;   · Pain: No  · Sexual Dysfunction: Not Assessed  · Skin Breakdown: No  · Tremors: Yes - left thumb at times;   · Dysphagia:  No  · Dysarthria:  No  · Heat sensitivity:  No  · Any un-met adaptive needs? No  · Copay Assist?  Yes - $12/month;       Objective:      25 foot timed walk: 4.4 seconds without assist;   Neurologic Exam    MENTAL STATUS:  grossly intact;   CRANIAL NERVE EXAM:  She has fine nystagmus in the abducting eye on both right    gaze and left gaze.   No facial asymmetry.  There is no dysarthria.    MOTOR EXAM:   strength is 5/5 in all " groups in    the lower extremities and upper extremities.    REFLEXES:  2+ and symmetric throughout in all four extremities; toes are down    bilaterally.  SENSORY EXAM:  She has significant loss of vibration in the bilateral hands, L>R  COORDINATION:  Normal finger-to-nose exam.  GAIT:  Her gait is slightly wide based, but relatively stable;     Imaging:     Results for orders placed during the hospital encounter of 07/09/15   MRI Brain W WO Contrast    Impression      White matter lesions in a pattern typical of multiple sclerosis.    No evidence for active demyelination.    No change relative to prior.      Electronically signed by: Sharif Palacios MD  Date:     07/10/15  Time:    14:43      Results for orders placed during the hospital encounter of 05/16/14   MRI Cervical Spine W WO Cont    Impression  Two focal multiple sclerosis plaques in the cervical spinal cord, one sitting behind C3-4 and one sitting behind C5 are noted.  Gadolinium enhancement is not seen.        Electronically signed by: Sven Hernandez MD  Date:     05/16/14  Time:    11:12      No results found for this or any previous visit.  No results found for this or any previous visit.  No results found for this or any previous visit.  No results found for this or any previous visit.      Labs:     Lab Results   Component Value Date    SMBBVEPU84KT 64 03/08/2018    UAXVGVHF65TJ 60 09/06/2017    ILDQHTEJ61PX 42 08/11/2016     No results found for: JCVINDEX, JCVANTIBODY  No results found for: SE1RGSWT, ABSOLUTECD3, ZH7FWTFF, ABSOLUTECD8, FW8LSXRC, ABSOLUTECD4, LABCD48  Lab Results   Component Value Date    WBC 5.38 08/21/2018    RBC 4.58 08/21/2018    HGB 13.9 08/21/2018    HCT 41.7 08/21/2018    MCV 91 08/21/2018    MCH 30.3 08/21/2018    MCHC 33.3 08/21/2018    RDW 11.9 08/21/2018     08/21/2018    MPV 11.0 08/21/2018    GRAN 3.1 08/21/2018    GRAN 57.0 08/21/2018    LYMPH 1.5 08/21/2018    LYMPH 28.3 08/21/2018    MONO 0.6 08/21/2018     MONO 11.0 08/21/2018    EOS 0.2 08/21/2018    BASO 0.04 08/21/2018    EOSINOPHIL 2.8 08/21/2018    BASOPHIL 0.7 08/21/2018     Sodium   Date Value Ref Range Status   09/15/2017 142 136 - 145 mmol/L Final     Potassium   Date Value Ref Range Status   09/15/2017 4.6 3.5 - 5.1 mmol/L Final     Chloride   Date Value Ref Range Status   09/15/2017 106 95 - 110 mmol/L Final     CO2   Date Value Ref Range Status   09/15/2017 27 23 - 29 mmol/L Final     Glucose   Date Value Ref Range Status   09/15/2017 105 70 - 110 mg/dL Final     BUN, Bld   Date Value Ref Range Status   09/15/2017 23 8 - 23 mg/dL Final     Creatinine   Date Value Ref Range Status   09/15/2017 0.9 0.5 - 1.4 mg/dL Final     Calcium   Date Value Ref Range Status   09/15/2017 9.1 8.7 - 10.5 mg/dL Final     Total Protein   Date Value Ref Range Status   08/21/2018 6.8 6.0 - 8.4 g/dL Final     Albumin   Date Value Ref Range Status   08/21/2018 3.8 3.5 - 5.2 g/dL Final     Total Bilirubin   Date Value Ref Range Status   08/21/2018 0.7 0.1 - 1.0 mg/dL Final     Comment:     For infants and newborns, interpretation of results should be based  on gestational age, weight and in agreement with clinical  observations.  Premature Infant recommended reference ranges:  Up to 24 hours.............<8.0 mg/dL  Up to 48 hours............<12.0 mg/dL  3-5 days..................<15.0 mg/dL  6-29 days.................<15.0 mg/dL       Alkaline Phosphatase   Date Value Ref Range Status   08/21/2018 74 55 - 135 U/L Final     AST   Date Value Ref Range Status   08/21/2018 16 10 - 40 U/L Final     ALT   Date Value Ref Range Status   08/21/2018 16 10 - 44 U/L Final     Anion Gap   Date Value Ref Range Status   09/15/2017 9 8 - 16 mmol/L Final     eGFR if    Date Value Ref Range Status   09/15/2017 >60.0 >60 mL/min/1.73 m^2 Final     eGFR if non    Date Value Ref Range Status   09/15/2017 >60.0 >60 mL/min/1.73 m^2 Final     Comment:     Calculation used  to obtain the estimated glomerular filtration  rate (eGFR) is the CKD-EPI equation. Since race is unknown   in our information system, the eGFR values for   -American and Non--American patients are given   for each creatinine result.         Diagnosis/Assessment/Plan:    1. Multiple Sclerosis  · Assessment: Pt reports sense of worsening left hand fine motor control;   · Imaging: will get MRI brain and C spine to make sure that no new or expanding lesion in light of her hand sx;   · Disease Modifying Therapies: continue Avonex; will continue to follow LFT/CBC longitudinally; continue high dose D3.     2. MS Symptom Assessment / Management  · No other changes to regimen described in ROS above    3. Arthritis of knees--advised that she maintain her HEP;  PT helped a lot when she did it last year;     Over 50% of this 40 minute visit was spent in direct face to face counseling of the patient about MS, DMT considerations, and MS symptom management.     F/u 6 mo Fay Snyder PA-C      Problem List Items Addressed This Visit        Unprioritized    MS (multiple sclerosis) - Primary    Relevant Orders    MRI Brain Demyelinating W W/O Contrast    MRI Cervical Spine Demyelinating W W/O Contrast    CBC auto differential    Hepatic function panel

## 2018-10-17 ENCOUNTER — LAB VISIT (OUTPATIENT)
Dept: LAB | Facility: HOSPITAL | Age: 68
End: 2018-10-17
Attending: PSYCHIATRY & NEUROLOGY
Payer: MEDICARE

## 2018-10-17 DIAGNOSIS — G35 MS (MULTIPLE SCLEROSIS): ICD-10-CM

## 2018-10-17 LAB
ALBUMIN SERPL BCP-MCNC: 3.8 G/DL
ALP SERPL-CCNC: 72 U/L
ALT SERPL W/O P-5'-P-CCNC: 18 U/L
AST SERPL-CCNC: 18 U/L
BASOPHILS # BLD AUTO: 0.05 K/UL
BASOPHILS NFR BLD: 0.9 %
BILIRUB DIRECT SERPL-MCNC: 0.2 MG/DL
BILIRUB SERPL-MCNC: 0.5 MG/DL
DIFFERENTIAL METHOD: NORMAL
EOSINOPHIL # BLD AUTO: 0.2 K/UL
EOSINOPHIL NFR BLD: 2.9 %
ERYTHROCYTE [DISTWIDTH] IN BLOOD BY AUTOMATED COUNT: 12.3 %
HCT VFR BLD AUTO: 42.2 %
HGB BLD-MCNC: 13.9 G/DL
IMM GRANULOCYTES # BLD AUTO: 0.01 K/UL
IMM GRANULOCYTES NFR BLD AUTO: 0.2 %
LYMPHOCYTES # BLD AUTO: 1.8 K/UL
LYMPHOCYTES NFR BLD: 30.9 %
MCH RBC QN AUTO: 30 PG
MCHC RBC AUTO-ENTMCNC: 32.9 G/DL
MCV RBC AUTO: 91 FL
MONOCYTES # BLD AUTO: 0.5 K/UL
MONOCYTES NFR BLD: 9.3 %
NEUTROPHILS # BLD AUTO: 3.2 K/UL
NEUTROPHILS NFR BLD: 55.8 %
NRBC BLD-RTO: 0 /100 WBC
PLATELET # BLD AUTO: 219 K/UL
PMV BLD AUTO: 10.9 FL
PROT SERPL-MCNC: 6.9 G/DL
RBC # BLD AUTO: 4.63 M/UL
WBC # BLD AUTO: 5.8 K/UL

## 2018-10-17 PROCEDURE — 36415 COLL VENOUS BLD VENIPUNCTURE: CPT | Mod: PO

## 2018-10-17 PROCEDURE — 85025 COMPLETE CBC W/AUTO DIFF WBC: CPT

## 2018-10-17 PROCEDURE — 80076 HEPATIC FUNCTION PANEL: CPT

## 2018-11-01 LAB
LEFT EYE DM RETINOPATHY: NEGATIVE
RIGHT EYE DM RETINOPATHY: NEGATIVE

## 2018-11-09 DIAGNOSIS — G35 MULTIPLE SCLEROSIS: Primary | ICD-10-CM

## 2018-11-13 ENCOUNTER — HOSPITAL ENCOUNTER (OUTPATIENT)
Dept: RADIOLOGY | Facility: HOSPITAL | Age: 68
Discharge: HOME | End: 2018-11-13
Attending: PSYCHIATRY & NEUROLOGY
Payer: MEDICARE

## 2018-11-13 DIAGNOSIS — G35 MS (MULTIPLE SCLEROSIS): ICD-10-CM

## 2018-11-13 PROCEDURE — 70553 MRI BRAIN STEM W/O & W/DYE: CPT | Mod: 26,,, | Performed by: RADIOLOGY

## 2018-11-13 PROCEDURE — A9585 GADOBUTROL INJECTION: HCPCS | Performed by: PSYCHIATRY & NEUROLOGY

## 2018-11-13 PROCEDURE — 25500020 PHARM REV CODE 255: Performed by: PSYCHIATRY & NEUROLOGY

## 2018-11-13 PROCEDURE — 70553 MRI BRAIN STEM W/O & W/DYE: CPT | Mod: TC

## 2018-11-13 PROCEDURE — 72156 MRI NECK SPINE W/O & W/DYE: CPT | Mod: 26,,, | Performed by: RADIOLOGY

## 2018-11-13 PROCEDURE — 72156 MRI NECK SPINE W/O & W/DYE: CPT | Mod: TC

## 2018-11-13 RX ORDER — GADOBUTROL 604.72 MG/ML
10 INJECTION INTRAVENOUS
Status: COMPLETED | OUTPATIENT
Start: 2018-11-13 | End: 2018-11-13

## 2018-11-13 RX ORDER — GADOBUTROL 604.72 MG/ML
INJECTION INTRAVENOUS
Status: DISPENSED
Start: 2018-11-13 | End: 2018-11-14

## 2018-11-13 RX ADMIN — GADOBUTROL 10 ML: 604.72 INJECTION INTRAVENOUS at 11:11

## 2018-12-04 ENCOUNTER — PATIENT MESSAGE (OUTPATIENT)
Dept: NEUROLOGY | Facility: CLINIC | Age: 68
End: 2018-12-04

## 2018-12-17 ENCOUNTER — DOCUMENTATION ONLY (OUTPATIENT)
Dept: FAMILY MEDICINE | Facility: CLINIC | Age: 68
End: 2018-12-17

## 2018-12-17 ENCOUNTER — PATIENT MESSAGE (OUTPATIENT)
Dept: FAMILY MEDICINE | Facility: CLINIC | Age: 68
End: 2018-12-17

## 2018-12-17 NOTE — PROGRESS NOTES
Pre-Visit Chart Review  For Appointment Scheduled on 12/18/18    Health Maintenance Due   Topic Date Due    TETANUS VACCINE  02/28/1970    Zoster Vaccine  02/28/2012    Influenza Vaccine  08/01/2018

## 2018-12-27 ENCOUNTER — PATIENT OUTREACH (OUTPATIENT)
Dept: ADMINISTRATIVE | Facility: HOSPITAL | Age: 68
End: 2018-12-27

## 2018-12-27 NOTE — LETTER
January 4, 2019    Naomi Mccabe Tre  [address]  [place] [address]              [facility] is committed to your overall health and would like to ensure that you are up to date on your recommended test and/or procedures.   Ada Hope MD  has found that your chart shows you may be due for the following:     COLORECTAL CANCER SCREENING       If you have had any of the above done at another facility, please let us know so that we may obtain copies from that facility.  If you have a copy of these records, please provide a copy for us to scan into your chart.  You are welcome to request that the report be faxed to us at  ([phone]).     Otherwise, please schedule these appointments at your earliest convenience by calling [phone] or going to [email or web address].     If you have an upcoming scheduled appointment, please disregard this letter.     Sincerely,   [facility]   MD Qiana Parrish LPN Clinical Care Coordinator   [place] [facility] [address] [place] [address]   Phone [phone]   Fax [phone]

## 2019-01-04 NOTE — PROGRESS NOTES
"Attempted to outreach patient for pre-visit via "[employer]", no answer after a week. Sending outreach via Mail Out Letter now.    "

## 2019-01-28 ENCOUNTER — PATIENT MESSAGE (OUTPATIENT)
Dept: NEUROLOGY | Facility: CLINIC | Age: 69
End: 2019-01-28

## 2019-01-28 DIAGNOSIS — G35 MULTIPLE SCLEROSIS: ICD-10-CM

## 2019-01-29 RX ORDER — INTERFERON BETA-1A 30MCG/.5ML
30 KIT INTRAMUSCULAR
Qty: 12 PEN | Refills: 0 | Status: SHIPPED | OUTPATIENT
Start: 2019-01-29 | End: 2019-05-02 | Stop reason: SDUPTHER

## 2019-04-15 ENCOUNTER — PATIENT MESSAGE (OUTPATIENT)
Dept: NEUROLOGY | Facility: CLINIC | Age: 69
End: 2019-04-15

## 2019-04-16 ENCOUNTER — PATIENT OUTREACH (OUTPATIENT)
Dept: ADMINISTRATIVE | Facility: HOSPITAL | Age: 69
End: 2019-04-16

## 2019-04-29 ENCOUNTER — PATIENT MESSAGE (OUTPATIENT)
Dept: NEUROLOGY | Facility: CLINIC | Age: 69
End: 2019-04-29

## 2019-04-30 ENCOUNTER — LAB VISIT (OUTPATIENT)
Dept: LAB | Facility: HOSPITAL | Age: 69
End: 2019-04-30
Attending: FAMILY MEDICINE
Payer: MEDICARE

## 2019-04-30 ENCOUNTER — OFFICE VISIT (OUTPATIENT)
Dept: FAMILY MEDICINE | Facility: CLINIC | Age: 69
End: 2019-04-30
Payer: MEDICARE

## 2019-04-30 ENCOUNTER — PATIENT MESSAGE (OUTPATIENT)
Dept: FAMILY MEDICINE | Facility: CLINIC | Age: 69
End: 2019-04-30

## 2019-04-30 VITALS
WEIGHT: 257.25 LBS | HEART RATE: 73 BPM | BODY MASS INDEX: 41.34 KG/M2 | SYSTOLIC BLOOD PRESSURE: 125 MMHG | TEMPERATURE: 99 F | HEIGHT: 66 IN | DIASTOLIC BLOOD PRESSURE: 77 MMHG

## 2019-04-30 DIAGNOSIS — E55.9 VITAMIN D DEFICIENCY: ICD-10-CM

## 2019-04-30 DIAGNOSIS — R79.9 ABNORMAL FINDING OF BLOOD CHEMISTRY: ICD-10-CM

## 2019-04-30 DIAGNOSIS — R73.9 HYPERGLYCEMIA: ICD-10-CM

## 2019-04-30 DIAGNOSIS — E66.01 OBESITY, CLASS III, BMI 40-49.9 (MORBID OBESITY): ICD-10-CM

## 2019-04-30 DIAGNOSIS — R73.9 HYPERGLYCEMIA: Primary | ICD-10-CM

## 2019-04-30 LAB
25(OH)D3+25(OH)D2 SERPL-MCNC: 59 NG/ML (ref 30–96)
ALBUMIN SERPL BCP-MCNC: 4 G/DL (ref 3.5–5.2)
ALP SERPL-CCNC: 71 U/L (ref 55–135)
ALT SERPL W/O P-5'-P-CCNC: 20 U/L (ref 10–44)
ANION GAP SERPL CALC-SCNC: 11 MMOL/L (ref 8–16)
AST SERPL-CCNC: 18 U/L (ref 10–40)
BILIRUB SERPL-MCNC: 0.8 MG/DL (ref 0.1–1)
BUN SERPL-MCNC: 24 MG/DL (ref 8–23)
CALCIUM SERPL-MCNC: 9.7 MG/DL (ref 8.7–10.5)
CHLORIDE SERPL-SCNC: 105 MMOL/L (ref 95–110)
CHOLEST SERPL-MCNC: 229 MG/DL (ref 120–199)
CHOLEST/HDLC SERPL: 3.8 {RATIO} (ref 2–5)
CO2 SERPL-SCNC: 23 MMOL/L (ref 23–29)
CREAT SERPL-MCNC: 1.1 MG/DL (ref 0.5–1.4)
EST. GFR  (AFRICAN AMERICAN): >60 ML/MIN/1.73 M^2
EST. GFR  (NON AFRICAN AMERICAN): 52.1 ML/MIN/1.73 M^2
ESTIMATED AVG GLUCOSE: 120 MG/DL (ref 68–131)
GLUCOSE SERPL-MCNC: 93 MG/DL (ref 70–110)
HBA1C MFR BLD HPLC: 5.8 % (ref 4–5.6)
HDLC SERPL-MCNC: 60 MG/DL (ref 40–75)
HDLC SERPL: 26.2 % (ref 20–50)
LDLC SERPL CALC-MCNC: 151.6 MG/DL (ref 63–159)
NONHDLC SERPL-MCNC: 169 MG/DL
POTASSIUM SERPL-SCNC: 4.2 MMOL/L (ref 3.5–5.1)
PROT SERPL-MCNC: 7.1 G/DL (ref 6–8.4)
SODIUM SERPL-SCNC: 139 MMOL/L (ref 136–145)
TRIGL SERPL-MCNC: 87 MG/DL (ref 30–150)

## 2019-04-30 PROCEDURE — 99213 OFFICE O/P EST LOW 20 MIN: CPT | Mod: PBBFAC,PO | Performed by: FAMILY MEDICINE

## 2019-04-30 PROCEDURE — 99999 PR PBB SHADOW E&M-EST. PATIENT-LVL III: CPT | Mod: PBBFAC,,, | Performed by: FAMILY MEDICINE

## 2019-04-30 PROCEDURE — 99214 PR OFFICE/OUTPT VISIT, EST, LEVL IV, 30-39 MIN: ICD-10-PCS | Mod: S$PBB,,, | Performed by: FAMILY MEDICINE

## 2019-04-30 PROCEDURE — 80061 LIPID PANEL: CPT

## 2019-04-30 PROCEDURE — 99999 PR PBB SHADOW E&M-EST. PATIENT-LVL III: ICD-10-PCS | Mod: PBBFAC,,, | Performed by: FAMILY MEDICINE

## 2019-04-30 PROCEDURE — 80053 COMPREHEN METABOLIC PANEL: CPT

## 2019-04-30 PROCEDURE — 99214 OFFICE O/P EST MOD 30 MIN: CPT | Mod: S$PBB,,, | Performed by: FAMILY MEDICINE

## 2019-04-30 PROCEDURE — 83036 HEMOGLOBIN GLYCOSYLATED A1C: CPT

## 2019-04-30 PROCEDURE — 36415 COLL VENOUS BLD VENIPUNCTURE: CPT | Mod: PO

## 2019-04-30 PROCEDURE — 82306 VITAMIN D 25 HYDROXY: CPT

## 2019-04-30 NOTE — PROGRESS NOTES
CHIEF COMPLAINT:  Follow up hyperglycemia      HISTORY OF PRESENT ILLNESS:  Naomi Toledo is a 69 y.o. female who presents to clinic for follow up    1. Hyperglycemia: A last  FBG was 95, Hga1c values were 5.7%, 5.9%.  She has been eating a ketogenic diet and working on weight loss but recently had some weight gain.           REVIEW OF SYSTEMS:  The patient denies any fever, chills, night sweats, headaches, vision changes, difficulty speaking or swallowing, decreased hearing, chest pain, palpitations, shortness of breath, cough, nausea, vomiting, abdominal pain, dysuria, diarrhea, constipation, hematuria, hematochezia, melena, changes in her hair, nails, numbness or weakness in her extremities, erythema, welling over any of her joints, myalgia, swollen glands, easy bruising, fatigue, edema, symptoms of anxiety or depression.       MEDICATIONS:   Reviewed and/or reconciled in EPIC    ALLERGIES:  Reviewed and/or reconciled in EPIC    PAST MEDICAL/SURGICAL HISTORY:   Past Medical History:   Diagnosis Date    Blood transfusion     Deep vein thrombosis     MS (multiple sclerosis)     Plantar fasciitis of left foot 2/2014      Past Surgical History:   Procedure Laterality Date    OVARIAN CYST SURGERY  1974    TUBAL LIGATION      VEIN SURGERY         FAMILY HISTORY:    Family History   Problem Relation Age of Onset    Cancer Mother         breast    Breast cancer Mother 50    Hypertension Father     Cancer Sister     Hypertension Sister     Breast cancer Sister 64    Cancer Brother     Diabetes Maternal Grandmother     Leukemia Paternal Grandmother        SOCIAL HISTORY:    Social History     Socioeconomic History    Marital status:      Spouse name: Not on file    Number of children: Not on file    Years of education: Not on file    Highest education level: Not on file   Occupational History    Not on file   Social Needs    Financial resource strain: Not very hard    Food insecurity:      Worry: Never true     Inability: Never true    Transportation needs:     Medical: No     Non-medical: No   Tobacco Use    Smoking status: Never Smoker    Smokeless tobacco: Never Used   Substance and Sexual Activity    Alcohol use: No     Frequency: Never    Drug use: No    Sexual activity: Yes     Partners: Male   Lifestyle    Physical activity:     Days per week: 0 days     Minutes per session: Not on file    Stress: Not at all   Relationships    Social connections:     Talks on phone: More than three times a week     Gets together: Twice a week     Attends [employer] service: Not on file     Active member of club or organization: Yes     Attends meetings of clubs or organizations: More than 4 times per year     Relationship status:    Other Topics Concern    Not on file   Social History Narrative    Not on file       PHYSICAL EXAM:  VITAL SIGNS:   There were no vitals filed for this visit.  GENERAL:  Patient appears well nourished, sitting on exam table, in no acute distress.  HEENT:  Atraumatic, normocephalic, PERRLA, EOMI, no conjunctival injection, sclerae are anicteric, normal external auditory canals,TMs clear b/l, gross hearing intact to whisper, MMM, no oropharygneal erythema or exudate.  NECK:  Supple, normal ROM, trachea is midline , no supraclavicular or cervical LAD or masses palpated.  Thyroid gland not palpable.  CARDIOVASCULAR:  RRR, normal S1 and S2, no m/r/g.  RESPIRATORY:  CTA b/l, no wheezes, rhonchi, rales.  No increased work of breathing, no  use of accessory muscles.  ABDOMEN:  Soft, nontender, nondistended, normoactive bowel sounds in all four quadrants, no rebound or guarding, no HSM or masses palpated.  Normal percussion.  EXTREMITIES:  2+ DP pulses b/l, no edema.  SKIN:  Warm, no lesions on exposed skin.  NEUROMUSCULAR:      Steady gait.  Cranial nerves II-XII grossly intact.  No clubbing or cyanosis of digits/nails.      PSYCH:  Patient is alert and oriented to person,  time, place. They are appropriately dressed and groomed. There is normal eye contact. Rate and tone of speech is normal. Normal insight, judgement. Normal thought content and process.     LABORATORY/IMAGING STUDIES: pending    ASSESSMENT/PLAN: This is a 69 y.o. female who presents to clinic for evaluation of the following concerns      1. Hyperglycemia  - Comprehensive metabolic panel; Future  - Lipid panel; Future  - Hemoglobin A1c; Future    2. Abnormal finding of blood chemistry   - Lipid panel; Future    3. Obesity, Class III, BMI 40-49.9 (morbid obesity)  See below    4. vitamin D deficiency  Obtain vitamin D level    .Patient readiness: acceptance and barriers:none    During the course of the visit the patient was educated and counseled about the following:     Obesity:   General weight loss/lifestyle modification strategies discussed (elicit support from others; identify saboteurs; non-food rewards, etc).  Diet interventions: moderate (500 kCal/d) deficit diet.  Informal exercise measures discussed, e.g. taking stairs instead of elevator.  Regular aerobic exercise program discussed.  Continue with ketogenic diet    Goals: Obesity: Reduce calorie intake and BMI    Did patient meet goals/outcomes: No    The following self management tools provided: declined    Patient Instructions (the written plan) was given to the patient/family.     Time spent with patient: 30 minutes        Ada Hope MD

## 2019-05-01 RX ORDER — MINOCYCLINE HYDROCHLORIDE 100 MG/1
100 CAPSULE ORAL DAILY
COMMUNITY
End: 2019-10-30

## 2019-05-02 ENCOUNTER — PATIENT MESSAGE (OUTPATIENT)
Dept: NEUROLOGY | Facility: CLINIC | Age: 69
End: 2019-05-02

## 2019-05-02 DIAGNOSIS — Z79.899 HIGH RISK MEDICATION USE: ICD-10-CM

## 2019-05-02 DIAGNOSIS — G35 MULTIPLE SCLEROSIS: ICD-10-CM

## 2019-05-02 DIAGNOSIS — G35 MULTIPLE SCLEROSIS: Primary | ICD-10-CM

## 2019-05-03 ENCOUNTER — TELEPHONE (OUTPATIENT)
Dept: NEUROLOGY | Facility: CLINIC | Age: 69
End: 2019-05-03

## 2019-05-03 ENCOUNTER — PATIENT MESSAGE (OUTPATIENT)
Dept: NEUROLOGY | Facility: CLINIC | Age: 69
End: 2019-05-03

## 2019-05-03 DIAGNOSIS — G35 MULTIPLE SCLEROSIS: ICD-10-CM

## 2019-05-08 ENCOUNTER — LAB VISIT (OUTPATIENT)
Dept: LAB | Facility: HOSPITAL | Age: 69
End: 2019-05-08
Attending: PHYSICIAN ASSISTANT
Payer: MEDICARE

## 2019-05-08 DIAGNOSIS — G35 MULTIPLE SCLEROSIS: ICD-10-CM

## 2019-05-08 DIAGNOSIS — Z79.899 HIGH RISK MEDICATION USE: ICD-10-CM

## 2019-05-08 LAB
BASOPHILS # BLD AUTO: 0.06 K/UL (ref 0–0.2)
BASOPHILS NFR BLD: 1.1 % (ref 0–1.9)
DIFFERENTIAL METHOD: NORMAL
EOSINOPHIL # BLD AUTO: 0.2 K/UL (ref 0–0.5)
EOSINOPHIL NFR BLD: 2.8 % (ref 0–8)
ERYTHROCYTE [DISTWIDTH] IN BLOOD BY AUTOMATED COUNT: 12.2 % (ref 11.5–14.5)
HCT VFR BLD AUTO: 42.5 % (ref 37–48.5)
HGB BLD-MCNC: 14.1 G/DL (ref 12–16)
IMM GRANULOCYTES # BLD AUTO: 0.02 K/UL (ref 0–0.04)
IMM GRANULOCYTES NFR BLD AUTO: 0.4 % (ref 0–0.5)
LYMPHOCYTES # BLD AUTO: 1.4 K/UL (ref 1–4.8)
LYMPHOCYTES NFR BLD: 26.3 % (ref 18–48)
MCH RBC QN AUTO: 29.7 PG (ref 27–31)
MCHC RBC AUTO-ENTMCNC: 33.2 G/DL (ref 32–36)
MCV RBC AUTO: 90 FL (ref 82–98)
MONOCYTES # BLD AUTO: 0.5 K/UL (ref 0.3–1)
MONOCYTES NFR BLD: 9.7 % (ref 4–15)
NEUTROPHILS # BLD AUTO: 3.2 K/UL (ref 1.8–7.7)
NEUTROPHILS NFR BLD: 59.7 % (ref 38–73)
NRBC BLD-RTO: 0 /100 WBC
PLATELET # BLD AUTO: 207 K/UL (ref 150–350)
PMV BLD AUTO: 11.3 FL (ref 9.2–12.9)
RBC # BLD AUTO: 4.75 M/UL (ref 4–5.4)
WBC # BLD AUTO: 5.28 K/UL (ref 3.9–12.7)

## 2019-05-08 PROCEDURE — 85025 COMPLETE CBC W/AUTO DIFF WBC: CPT

## 2019-05-08 PROCEDURE — 36415 COLL VENOUS BLD VENIPUNCTURE: CPT | Mod: PO

## 2019-05-08 RX ORDER — INTERFERON BETA-1A 30MCG/.5ML
30 KIT INTRAMUSCULAR
Qty: 4 PEN | Refills: 0 | Status: SHIPPED | OUTPATIENT
Start: 2019-05-08 | End: 2019-11-25 | Stop reason: SDUPTHER

## 2019-05-08 RX ORDER — INTERFERON BETA-1A 30MCG/.5ML
KIT INTRAMUSCULAR
Qty: 12 PEN | Refills: 1 | Status: SHIPPED | OUTPATIENT
Start: 2019-05-08 | End: 2019-05-23

## 2019-05-23 ENCOUNTER — OFFICE VISIT (OUTPATIENT)
Dept: FAMILY MEDICINE | Facility: CLINIC | Age: 69
End: 2019-05-23
Payer: MEDICARE

## 2019-05-23 VITALS
DIASTOLIC BLOOD PRESSURE: 89 MMHG | TEMPERATURE: 98 F | HEIGHT: 66 IN | OXYGEN SATURATION: 98 % | SYSTOLIC BLOOD PRESSURE: 135 MMHG | BODY MASS INDEX: 41.91 KG/M2 | WEIGHT: 260.81 LBS | HEART RATE: 80 BPM

## 2019-05-23 DIAGNOSIS — L98.9 SKIN LESION: ICD-10-CM

## 2019-05-23 DIAGNOSIS — M54.6 CHRONIC RIGHT-SIDED THORACIC BACK PAIN: Primary | ICD-10-CM

## 2019-05-23 DIAGNOSIS — G89.29 CHRONIC RIGHT-SIDED THORACIC BACK PAIN: Primary | ICD-10-CM

## 2019-05-23 DIAGNOSIS — R68.84 JAW PAIN: ICD-10-CM

## 2019-05-23 PROCEDURE — 99214 OFFICE O/P EST MOD 30 MIN: CPT | Mod: S$PBB,,, | Performed by: NURSE PRACTITIONER

## 2019-05-23 PROCEDURE — 99999 PR PBB SHADOW E&M-EST. PATIENT-LVL III: CPT | Mod: PBBFAC,,, | Performed by: NURSE PRACTITIONER

## 2019-05-23 PROCEDURE — 99214 PR OFFICE/OUTPT VISIT, EST, LEVL IV, 30-39 MIN: ICD-10-PCS | Mod: S$PBB,,, | Performed by: NURSE PRACTITIONER

## 2019-05-23 PROCEDURE — 99213 OFFICE O/P EST LOW 20 MIN: CPT | Mod: PBBFAC,PO | Performed by: NURSE PRACTITIONER

## 2019-05-23 PROCEDURE — 99999 PR PBB SHADOW E&M-EST. PATIENT-LVL III: ICD-10-PCS | Mod: PBBFAC,,, | Performed by: NURSE PRACTITIONER

## 2019-05-23 RX ORDER — TIZANIDINE 4 MG/1
4 TABLET ORAL EVERY 6 HOURS PRN
Qty: 30 TABLET | Refills: 0 | Status: SHIPPED | OUTPATIENT
Start: 2019-05-23 | End: 2019-06-14

## 2019-05-23 RX ORDER — METHYLPREDNISOLONE 4 MG/1
TABLET ORAL
Qty: 1 PACKAGE | Refills: 0 | Status: SHIPPED | OUTPATIENT
Start: 2019-05-23 | End: 2019-06-13

## 2019-05-23 NOTE — PROGRESS NOTES
Subjective:       Patient ID: Naomi Toledo is a 69 y.o. female.    Chief Complaint: Back Pain    Ms. Toledo presents today for complaints of right sided thoracic back pain that has been present on and off for for years. Per patient, mentioned it to Dr. Mcmullen years ago and imaging only showed fatty liver. Never mentioned to current PCP. Over the last couple of days, pain has been worse. Nothing makes the pain worse or better. Also has a spot on her scalp that she is concerned with and the occasional right jaw cramp. Occurred last week and lasted all day but resolved independently.      Patient Active Problem List   Diagnosis    Hyperglycemia    MS (multiple sclerosis)    Positive NURIA (antinuclear antibody)    Encounter for long-term (current) use of high-risk medication    Urinary urgency    Prophylactic immunotherapy     Review of Systems   Constitutional: Negative for activity change, chills, fatigue and fever.   HENT: Negative for congestion, rhinorrhea, sinus pressure and sinus pain.    Respiratory: Negative for cough, chest tightness, shortness of breath and wheezing.    Cardiovascular: Negative for chest pain, palpitations and leg swelling.   Gastrointestinal: Positive for diarrhea (intermittent ). Negative for abdominal pain, blood in stool, constipation, nausea and vomiting.   Genitourinary: Negative for difficulty urinating, dysuria, flank pain, frequency, hematuria, pelvic pain and urgency.   Musculoskeletal: Positive for back pain. Negative for arthralgias, neck pain and neck stiffness.   Neurological: Negative for dizziness, weakness, light-headedness, numbness and headaches.       Objective:      Physical Exam   Constitutional: She is oriented to person, place, and time. She appears well-developed and well-nourished.   Cardiovascular: Normal rate, regular rhythm and normal heart sounds.   Pulmonary/Chest: Effort normal and breath sounds normal.   Musculoskeletal: She exhibits no edema.         Arms:  Neurological: She is alert and oriented to person, place, and time.   Skin: Skin is warm and dry.   Psychiatric: She has a normal mood and affect.   Nursing note and vitals reviewed.      Assessment:       1. Chronic right-sided thoracic back pain    2. Skin lesion    3. Jaw pain        Plan:       Naomi was seen today for back pain.    Diagnoses and all orders for this visit:    Chronic right-sided thoracic back pain  -     methylPREDNISolone (MEDROL DOSEPACK) 4 mg tablet; use as directed  -     tiZANidine (ZANAFLEX) 4 MG tablet; Take 1 tablet (4 mg total) by mouth every 6 (six) hours as needed.  F/U 1 month to see if improved     Skin lesion  Patient to schedule with established dermatologist, Dr. Summers    Jaw pain  Possible TMJ? Avoid NSAID's for pain due to kidney function

## 2019-06-14 ENCOUNTER — PATIENT MESSAGE (OUTPATIENT)
Dept: NEUROLOGY | Facility: CLINIC | Age: 69
End: 2019-06-14

## 2019-06-14 ENCOUNTER — OFFICE VISIT (OUTPATIENT)
Dept: NEUROLOGY | Facility: CLINIC | Age: 69
End: 2019-06-14
Payer: MEDICARE

## 2019-06-14 VITALS — WEIGHT: 250 LBS | BODY MASS INDEX: 40.18 KG/M2 | HEIGHT: 66 IN

## 2019-06-14 DIAGNOSIS — E55.9 VITAMIN D INSUFFICIENCY: ICD-10-CM

## 2019-06-14 DIAGNOSIS — Z79.899 ENCOUNTER FOR LONG-TERM (CURRENT) USE OF HIGH-RISK MEDICATION: ICD-10-CM

## 2019-06-14 DIAGNOSIS — K59.9 BOWEL DYSFUNCTION: ICD-10-CM

## 2019-06-14 DIAGNOSIS — R53.83 FATIGUE, UNSPECIFIED TYPE: ICD-10-CM

## 2019-06-14 DIAGNOSIS — Z71.89 COUNSELING REGARDING GOALS OF CARE: ICD-10-CM

## 2019-06-14 DIAGNOSIS — G35 MULTIPLE SCLEROSIS: Primary | ICD-10-CM

## 2019-06-14 DIAGNOSIS — R39.15 URINARY URGENCY: ICD-10-CM

## 2019-06-14 PROCEDURE — 99215 PR OFFICE/OUTPT VISIT, EST, LEVL V, 40-54 MIN: ICD-10-PCS | Mod: S$PBB,,, | Performed by: PHYSICIAN ASSISTANT

## 2019-06-14 PROCEDURE — 99999 PR PBB SHADOW E&M-EST. PATIENT-LVL III: ICD-10-PCS | Mod: PBBFAC,,, | Performed by: PHYSICIAN ASSISTANT

## 2019-06-14 PROCEDURE — 99215 OFFICE O/P EST HI 40 MIN: CPT | Mod: S$PBB,,, | Performed by: PHYSICIAN ASSISTANT

## 2019-06-14 PROCEDURE — 99213 OFFICE O/P EST LOW 20 MIN: CPT | Mod: PBBFAC | Performed by: PHYSICIAN ASSISTANT

## 2019-06-14 PROCEDURE — 99999 PR PBB SHADOW E&M-EST. PATIENT-LVL III: CPT | Mod: PBBFAC,,, | Performed by: PHYSICIAN ASSISTANT

## 2019-06-14 NOTE — Clinical Note
Taiwo Temple--Just wanted to touch base with you. I see that Camila has an upcoming appt with you. I asked that she discuss her bowel issues(frequent loose stool) with you. It is not typical of folks with MS to have loose stool(Camila was attributing it to her MS-constipation is much more likely), and she has been very stable to quit some time. She is having some incontinence at times due to the urgent nature of the loose stool.  Let me know your thoughts!Thanks, SANDRA [facility]

## 2019-06-14 NOTE — PROGRESS NOTES
"Subjective:       Patient ID: Naomi Toledo is a 69 y.o. female who presents today with  for a routine clinic visit for MS.      MS HPI:  · DMT: Avonex  · Side effects from DMT? No  · Taking vitamin D3 as recommended? Yes - 5,000IU(AMAZon)   · Feels well overall regarding MS-continues with L hand difficulties-feels it may be a bit worse.   · Will be leaving August-mid Sept. For 6 weeks for birth of grandchild-will run out of Avonex during this time  · Having some knee pain    SOCIAL HISTORY  Social History     Tobacco Use    Smoking status: Never Smoker    Smokeless tobacco: Never Used   Substance Use Topics    Alcohol use: No     Frequency: Never    Drug use: No     Living arrangements - the patient lives with their spouse.  Employment homemaker    MS ROS:  · Fatigue: Yes - endurance is not what it used to be-naps most days  · Sleep Disturbance: No  · Bladder Dysfunction: Yes - urgency; has to wear a pad;   · Bowel Dysfunction: "mostly good"; occasional urgency/incontinence every other week-she has frequent loose stool ??  · Spasticity: No  · Visual Symptoms: No  · Cognitive: Yes - some word finding at times   · Mood Disorder: No  · Gait Disturbance: No  · Falls: No  · Hand Dysfunction: Yes - feels like Left hand is worsening-thumb tremor, lack of sensitivity--  · Pain: No  · Sexual Dysfunction: Not Assessed  · Skin Breakdown: No  · Tremors: Yes - left thumb at times   · Dysphagia:  No  · Dysarthria:  No  · Heat sensitivity:  Yes--avoid heat outside and hot showers  · Any un-met adaptive needs? No  · Copay Assist?  Yes - $12/month;           Objective:        1. 25 foot timed walk: 4.8s today; 4.4s/4.7s previous visit  Timed 25 Foot Walk: 8/19/2016 9/15/2017   Did patient wear an AFO? No No   Was assistive device used? No No   Time for 25 Foot Walk (seconds) 4.9 4.7   Time for 25 Foot Walk (seconds) 4.9 4.7       Neurologic Exam     Mental Status   Oriented to person, place, and time.   Follows 3 step " commands.   Speech: speech is normal   Level of consciousness: alert  Normal comprehension.     Cranial Nerves     CN II   Visual acuity: normal with correction (20/20 OD and OS with Snellen hand held chart at 6 ft)    CN III, IV, VI   Pupils are equal, round, and reactive to light.  Extraocular motions are normal.     CN V   Facial sensation intact.     CN VII   Facial expression full, symmetric.     CN VIII   Hearing: intact (finger rub)    CN IX, X   Palate: symmetric    CN XI   CN XI normal.     CN XII   Tongue deviation: none    Motor Exam   Muscle bulk: normal  Overall muscle tone: normal    Strength   Right deltoid: 5/5  Left deltoid: 5/5  Right triceps: 5/5  Left triceps: 5/5  Right wrist extension: 5/5  Left wrist extension: 5/5  Right interossei: 5/5  Left interossei: 5/5  Right iliopsoas: 5/5  Left iliopsoas: 5/5  Right hamstrin/5  Left hamstrin/5  Right anterior tibial: 5/5  Left anterior tibial: 5/5  Right peroneal: 5/5  Left peroneal: 5/5    Sensory Exam   Right arm vibration: decreased from fingers  Left arm vibration: decreased from fingers  Right leg vibration: decreased from toes  Left leg vibration: decreased from toes    Gait, Coordination, and Reflexes     Gait  Gait: (antalgic in nature)    Coordination   Finger to nose coordination: normal  Tandem walking coordination: abnormal    Tremor   Resting tremor: tremor L thumb.  Action tremor: absent    Reflexes   Right brachioradialis: 2+  Left brachioradialis: 2+  Right biceps: 2+  Left biceps: 2+  Right triceps: 2+  Left triceps: 2+  Right patellar: 2+  Left patellar: 2+  Right achilles: 2+  Left achilles: 2+  Right plantar: normal  Left plantar: normal  Right ankle clonus: absent  Left ankle clonus: absent  Right pendular knee jerk: absent  Left pendular knee jerk: absentDifficulty with Heel/toe walk  Impaired L hand RSM         Imaging:       Results for orders placed during the hospital encounter of 18   MRI Brain Demyelinating W  W/O Contrast    Impression 1. There is a stable burden of white matter disease scattered throughout the periventricular, pericallosal and subcortical white matter.  There are no new regions of signal abnormality in the brain.  There is no restricted diffusion or pathologic enhancement.  There are no findings to suggest progression of demyelinating disease.  2. There is no hemorrhage, mass effect, acute infarction or abnormal enhancement elsewhere in the brain.      Electronically signed by: Osmani Grullon MD  Date:    11/13/2018  Time:    13:28     Results for orders placed during the hospital encounter of 11/13/18   MRI Cervical Spine Demyelinating W W/O Contrast    Impression 1. Stable appearance of the cervical spine and cervical cord when compared to prior MRI dated 05/16/2014.  There are 2 lesions in the dorsal cord.  These are located at the level of C3 and C5.  These are unchanged and consistent with the provided diagnosis of demyelination.  There are no definite new regions of signal abnormality in the cord and there is no pathologic enhancement to suggest active or progressive disease.  There may also be a very tiny lesion of the right lateral cord which is unchanged.  2. There is only very mild degenerative change in the cervical spine.  There is, however, no spinal canal or significant foraminal stenosis at any level and there is no change.      Electronically signed by: Osmani Grullon MD  Date:    11/16/2018  Time:    16:43         Labs:     Lab Results   Component Value Date    UBCJRMPD51OF 59 04/30/2019    MVSKWTOJ27KD 64 03/08/2018    DBWBCCQV14PW 60 09/06/2017     No results found for: JCVINDEX, JCVANTIBODY  No results found for: HV4WIBPI, ABSOLUTECD3, NC8NSAVZ, ABSOLUTECD8, EY8DEJTE, ABSOLUTECD4, LABCD48  Lab Results   Component Value Date    WBC 5.28 05/08/2019    RBC 4.75 05/08/2019    HGB 14.1 05/08/2019    HCT 42.5 05/08/2019    MCV 90 05/08/2019    MCH 29.7 05/08/2019    MCHC 33.2  05/08/2019    RDW 12.2 05/08/2019     05/08/2019    MPV 11.3 05/08/2019    GRAN 3.2 05/08/2019    GRAN 59.7 05/08/2019    LYMPH 1.4 05/08/2019    LYMPH 26.3 05/08/2019    MONO 0.5 05/08/2019    MONO 9.7 05/08/2019    EOS 0.2 05/08/2019    BASO 0.06 05/08/2019    EOSINOPHIL 2.8 05/08/2019    BASOPHIL 1.1 05/08/2019     Sodium   Date Value Ref Range Status   04/30/2019 139 136 - 145 mmol/L Final     Potassium   Date Value Ref Range Status   04/30/2019 4.2 3.5 - 5.1 mmol/L Final     Chloride   Date Value Ref Range Status   04/30/2019 105 95 - 110 mmol/L Final     CO2   Date Value Ref Range Status   04/30/2019 23 23 - 29 mmol/L Final     Glucose   Date Value Ref Range Status   04/30/2019 93 70 - 110 mg/dL Final     BUN, Bld   Date Value Ref Range Status   04/30/2019 24 (H) 8 - 23 mg/dL Final     Creatinine   Date Value Ref Range Status   04/30/2019 1.1 0.5 - 1.4 mg/dL Final     Calcium   Date Value Ref Range Status   04/30/2019 9.7 8.7 - 10.5 mg/dL Final     Total Protein   Date Value Ref Range Status   04/30/2019 7.1 6.0 - 8.4 g/dL Final     Albumin   Date Value Ref Range Status   04/30/2019 4.0 3.5 - 5.2 g/dL Final     Total Bilirubin   Date Value Ref Range Status   04/30/2019 0.8 0.1 - 1.0 mg/dL Final     Comment:     For infants and newborns, interpretation of results should be based  on gestational age, weight and in agreement with clinical  observations.  Premature Infant recommended reference ranges:  Up to 24 hours.............<8.0 mg/dL  Up to 48 hours............<12.0 mg/dL  3-5 days..................<15.0 mg/dL  6-29 days.................<15.0 mg/dL       Alkaline Phosphatase   Date Value Ref Range Status   04/30/2019 71 55 - 135 U/L Final     AST   Date Value Ref Range Status   04/30/2019 18 10 - 40 U/L Final     ALT   Date Value Ref Range Status   04/30/2019 20 10 - 44 U/L Final     Anion Gap   Date Value Ref Range Status   04/30/2019 11 8 - 16 mmol/L Final     eGFR if    Date Value  Ref Range Status   04/30/2019 >60.0 >60 mL/min/1.73 m^2 Final     eGFR if non    Date Value Ref Range Status   04/30/2019 52.1 (A) >60 mL/min/1.73 m^2 Final     Comment:     Calculation used to obtain the estimated glomerular filtration  rate (eGFR) is the CKD-EPI equation.          Diagnosis/Assessment/Plan:   1.  Multiple Sclerosis  · Assessment: Pt reports sense of worsening left hand fine motor control; however, exam is stable.  · Imaging: will get MRI brain November 2019(last C spine done 11/2018)         Disease Modifying Therapies: continue Avonex-will discuss getting extra month so that she will be able to take on her 6 week vacation; will continue to follow LFT/CBC longitudinally-due in October); continue high dose D3(I've asked her to increase just a bit to 5,000IU M-F, and 10,000 on weekends.     2. MS Symptom Assessment / Management  · Fatigue: trial [id number]  · Bowel Dysfunction: discuss frequent loose stool with PCP-not typical of MS to manifest with loose stool.      Our visit today lasted 40 minutes, and 100% of this time was spent face to face with the patient. Over 50% of this visit included discussion of the treatment plan/medication changes/symptom management/exam findings/imaging results/coordination of care. The patient agrees with the plan of care.     Follow up in about 6 months (around 12/14/2019) for follow up with me.  Patient agreed to POC today.    Attending, Dr. Roth, was available during today's encounter.     [facility]      Problem List Items Addressed This Visit        Renal/    Urinary urgency       Other    Encounter for long-term (current) use of high-risk medication    Relevant Orders    CBC auto differential    Hepatic function panel      Other Visit Diagnoses     Multiple sclerosis    -  Primary    Relevant Orders    CBC auto differential    Hepatic function panel    Vitamin D    MRI Brain Demyelinating Without Contrast    Vitamin  D insufficiency        Relevant Orders    Vitamin D    Counseling regarding goals of care        Bowel dysfunction

## 2019-06-19 ENCOUNTER — OFFICE VISIT (OUTPATIENT)
Dept: FAMILY MEDICINE | Facility: CLINIC | Age: 69
End: 2019-06-19
Payer: MEDICARE

## 2019-06-19 VITALS
DIASTOLIC BLOOD PRESSURE: 78 MMHG | HEIGHT: 66 IN | BODY MASS INDEX: 42.13 KG/M2 | OXYGEN SATURATION: 95 % | WEIGHT: 262.13 LBS | HEART RATE: 91 BPM | TEMPERATURE: 99 F | SYSTOLIC BLOOD PRESSURE: 116 MMHG

## 2019-06-19 DIAGNOSIS — R09.82 POST-NASAL DRIP: ICD-10-CM

## 2019-06-19 DIAGNOSIS — R19.7 DIARRHEA, UNSPECIFIED TYPE: ICD-10-CM

## 2019-06-19 DIAGNOSIS — G89.29 CHRONIC RIGHT-SIDED THORACIC BACK PAIN: Primary | ICD-10-CM

## 2019-06-19 DIAGNOSIS — G35 MS (MULTIPLE SCLEROSIS): ICD-10-CM

## 2019-06-19 DIAGNOSIS — R15.2 INCONTINENCE OF FECES WITH FECAL URGENCY: ICD-10-CM

## 2019-06-19 DIAGNOSIS — R15.9 INCONTINENCE OF FECES WITH FECAL URGENCY: ICD-10-CM

## 2019-06-19 DIAGNOSIS — M54.6 CHRONIC RIGHT-SIDED THORACIC BACK PAIN: Primary | ICD-10-CM

## 2019-06-19 PROCEDURE — 99999 PR PBB SHADOW E&M-EST. PATIENT-LVL IV: CPT | Mod: PBBFAC,,, | Performed by: NURSE PRACTITIONER

## 2019-06-19 PROCEDURE — 99214 OFFICE O/P EST MOD 30 MIN: CPT | Mod: S$PBB,,, | Performed by: NURSE PRACTITIONER

## 2019-06-19 PROCEDURE — 99999 PR PBB SHADOW E&M-EST. PATIENT-LVL IV: ICD-10-PCS | Mod: PBBFAC,,, | Performed by: NURSE PRACTITIONER

## 2019-06-19 PROCEDURE — 99214 PR OFFICE/OUTPT VISIT, EST, LEVL IV, 30-39 MIN: ICD-10-PCS | Mod: S$PBB,,, | Performed by: NURSE PRACTITIONER

## 2019-06-19 PROCEDURE — 99214 OFFICE O/P EST MOD 30 MIN: CPT | Mod: PBBFAC,PO | Performed by: NURSE PRACTITIONER

## 2019-06-19 RX ORDER — FLUTICASONE PROPIONATE 50 MCG
1 SPRAY, SUSPENSION (ML) NASAL DAILY
Qty: 1 BOTTLE | Refills: 3 | Status: SHIPPED | OUTPATIENT
Start: 2019-06-19 | End: 2019-10-30

## 2019-06-19 NOTE — PROGRESS NOTES
"Subjective:       Patient ID: Naomi Toledo is a 69 y.o. female.    Chief Complaint: Follow-up (1 month)    Ms. Toledo presents today for a follow up appointment on back pain and jaw pain. Also notified by her neurologist that she is having frequent loose stools and some fecal incontinence. Patient reports this has been happening for over 2 years, but is becoming more frequent. Was told to watch her diet. Believes nuts make it worse. Has difficulty with bowel control and lacks the sensation of having a BM. Currently taking one anti-diarrheal daily to help with symptoms. Still having pain in her throat (originally thought to be jaw pain). Pain will shoot into her ear. Feels like the "beginning of a cold". Worse on the right side of her throat and more noticeable when swallowing.     Patient Active Problem List   Diagnosis    Hyperglycemia    MS (multiple sclerosis)    Positive NURIA (antinuclear antibody)    Encounter for long-term (current) use of high-risk medication    Urinary urgency    Prophylactic immunotherapy     Review of Systems   Constitutional: Negative for activity change, appetite change, fatigue and fever.   HENT: Positive for postnasal drip and trouble swallowing.    Respiratory: Negative for cough, chest tightness, shortness of breath and wheezing.    Cardiovascular: Negative for chest pain, palpitations and leg swelling.   Gastrointestinal: Positive for diarrhea. Negative for abdominal pain, nausea and vomiting.        Fecal incontinence    Musculoskeletal: Positive for back pain.       Objective:      Physical Exam   Constitutional: She is oriented to person, place, and time. She appears well-developed and well-nourished.   HENT:   Mouth/Throat: Oropharynx is clear and moist.   Cardiovascular: Normal rate, regular rhythm and normal heart sounds.   Pulmonary/Chest: Effort normal and breath sounds normal.   Abdominal: Soft. There is no tenderness.   Musculoskeletal: She exhibits no edema.        " Arms:  Neurological: She is alert and oriented to person, place, and time.   Skin: Skin is warm and dry.   Psychiatric: She has a normal mood and affect.   Nursing note and vitals reviewed.      Assessment:       1. Chronic right-sided thoracic back pain    2. MS (multiple sclerosis)    3. Diarrhea, unspecified type    4. Incontinence of feces with fecal urgency    5. Post-nasal drip        Plan:       Naomi was seen today for follow-up.    Diagnoses and all orders for this visit:    Chronic right-sided thoracic back pain  -     X-Ray Thoracic Spine AP Lateral; Future  Mild relief with previous medications, still present    MS (multiple sclerosis)  -     Ambulatory referral to Gastroenterology  Continue under the care of neurology     Diarrhea, unspecified type  -     Stool culture; Future  -     WBC, Stool; Future  -     CULTURE, STOOL; Future  -     Giardia / Cryptosporidum, EIA; Future  -     H. pylori antigen, stool; Future  -     Rotavirus antigen, stool; Future  -     Stool Exam-Ova,Cysts,Parasites; Future  -     Tissue transglutaminase, IgA; Future  -     IgA; Future  -     Ambulatory referral to Gastroenterology    Incontinence of feces with fecal urgency  -     Stool culture; Future  -     WBC, Stool; Future  -     CULTURE, STOOL; Future  -     Giardia / Cryptosporidum, EIA; Future  -     H. pylori antigen, stool; Future  -     Rotavirus antigen, stool; Future  -     Stool Exam-Ova,Cysts,Parasites; Future  -     Ambulatory referral to Gastroenterology    Post-nasal drip  -     fluticasone propionate (FLONASE) 50 mcg/actuation nasal spray; 1 spray (50 mcg total) by Each Nare route once daily.  Throat pain caused by post nasal drip? Try Flonase

## 2019-06-20 ENCOUNTER — HOSPITAL ENCOUNTER (OUTPATIENT)
Dept: RADIOLOGY | Facility: CLINIC | Age: 69
Discharge: HOME | End: 2019-06-20
Attending: NURSE PRACTITIONER
Payer: MEDICARE

## 2019-06-20 DIAGNOSIS — G89.29 CHRONIC RIGHT-SIDED THORACIC BACK PAIN: ICD-10-CM

## 2019-06-20 DIAGNOSIS — M54.6 CHRONIC RIGHT-SIDED THORACIC BACK PAIN: ICD-10-CM

## 2019-06-20 PROCEDURE — 72070 X-RAY EXAM THORAC SPINE 2VWS: CPT | Mod: 26,,, | Performed by: RADIOLOGY

## 2019-06-20 PROCEDURE — 72070 XR THORACIC SPINE AP LATERAL: ICD-10-PCS | Mod: 26,,, | Performed by: RADIOLOGY

## 2019-06-20 PROCEDURE — 72070 X-RAY EXAM THORAC SPINE 2VWS: CPT | Mod: TC,FY,PO

## 2019-06-26 ENCOUNTER — PATIENT MESSAGE (OUTPATIENT)
Dept: FAMILY MEDICINE | Facility: CLINIC | Age: 69
End: 2019-06-26

## 2019-09-30 ENCOUNTER — HOSPITAL ENCOUNTER (OUTPATIENT)
Dept: RADIOLOGY | Facility: CLINIC | Age: 69
Discharge: HOME | End: 2019-09-30
Attending: NURSE PRACTITIONER
Payer: MEDICARE

## 2019-09-30 DIAGNOSIS — Z12.39 ENCOUNTER FOR SPECIAL SCREENING EXAMINATION FOR NEOPLASM OF BREAST: ICD-10-CM

## 2019-09-30 PROCEDURE — 77067 MAMMO DIGITAL SCREENING BILAT WITH TOMOSYNTHESIS_CAD: ICD-10-PCS | Mod: 26,,, | Performed by: RADIOLOGY

## 2019-09-30 PROCEDURE — 77067 SCR MAMMO BI INCL CAD: CPT | Mod: TC,PO

## 2019-09-30 PROCEDURE — 77067 SCR MAMMO BI INCL CAD: CPT | Mod: 26,,, | Performed by: RADIOLOGY

## 2019-09-30 PROCEDURE — 77063 BREAST TOMOSYNTHESIS BI: CPT | Mod: 26,,, | Performed by: RADIOLOGY

## 2019-09-30 PROCEDURE — 77063 MAMMO DIGITAL SCREENING BILAT WITH TOMOSYNTHESIS_CAD: ICD-10-PCS | Mod: 26,,, | Performed by: RADIOLOGY

## 2019-10-14 ENCOUNTER — LAB VISIT (OUTPATIENT)
Dept: LAB | Facility: HOSPITAL | Age: 69
End: 2019-10-14
Attending: NURSE PRACTITIONER
Payer: MEDICARE

## 2019-10-14 DIAGNOSIS — Z79.899 ENCOUNTER FOR LONG-TERM (CURRENT) USE OF HIGH-RISK MEDICATION: ICD-10-CM

## 2019-10-14 DIAGNOSIS — E55.9 VITAMIN D INSUFFICIENCY: ICD-10-CM

## 2019-10-14 DIAGNOSIS — G35 MULTIPLE SCLEROSIS: ICD-10-CM

## 2019-10-14 LAB
25(OH)D3+25(OH)D2 SERPL-MCNC: 62 NG/ML (ref 30–96)
ALBUMIN SERPL BCP-MCNC: 3.8 G/DL (ref 3.5–5.2)
ALP SERPL-CCNC: 70 U/L (ref 55–135)
ALT SERPL W/O P-5'-P-CCNC: 39 U/L (ref 10–44)
AST SERPL-CCNC: 35 U/L (ref 10–40)
BASOPHILS # BLD AUTO: 0.03 K/UL (ref 0–0.2)
BASOPHILS NFR BLD: 0.6 % (ref 0–1.9)
BILIRUB DIRECT SERPL-MCNC: 0.3 MG/DL (ref 0.1–0.3)
BILIRUB SERPL-MCNC: 0.9 MG/DL (ref 0.1–1)
DIFFERENTIAL METHOD: ABNORMAL
EOSINOPHIL # BLD AUTO: 0.1 K/UL (ref 0–0.5)
EOSINOPHIL NFR BLD: 2.6 % (ref 0–8)
ERYTHROCYTE [DISTWIDTH] IN BLOOD BY AUTOMATED COUNT: 12 % (ref 11.5–14.5)
HCT VFR BLD AUTO: 43 % (ref 37–48.5)
HGB BLD-MCNC: 14.8 G/DL (ref 12–16)
IMM GRANULOCYTES # BLD AUTO: 0.02 K/UL (ref 0–0.04)
IMM GRANULOCYTES NFR BLD AUTO: 0.4 % (ref 0–0.5)
LYMPHOCYTES # BLD AUTO: 0.6 K/UL (ref 1–4.8)
LYMPHOCYTES NFR BLD: 12.3 % (ref 18–48)
MCH RBC QN AUTO: 30.1 PG (ref 27–31)
MCHC RBC AUTO-ENTMCNC: 34.4 G/DL (ref 32–36)
MCV RBC AUTO: 87 FL (ref 82–98)
MONOCYTES # BLD AUTO: 0.5 K/UL (ref 0.3–1)
MONOCYTES NFR BLD: 9.1 % (ref 4–15)
NEUTROPHILS # BLD AUTO: 3.8 K/UL (ref 1.8–7.7)
NEUTROPHILS NFR BLD: 75 % (ref 38–73)
NRBC BLD-RTO: 0 /100 WBC
PLATELET # BLD AUTO: 179 K/UL (ref 150–350)
PMV BLD AUTO: 11 FL (ref 9.2–12.9)
PROT SERPL-MCNC: 6.9 G/DL (ref 6–8.4)
RBC # BLD AUTO: 4.92 M/UL (ref 4–5.4)
WBC # BLD AUTO: 5.06 K/UL (ref 3.9–12.7)

## 2019-10-14 PROCEDURE — 82306 VITAMIN D 25 HYDROXY: CPT

## 2019-10-14 PROCEDURE — 80076 HEPATIC FUNCTION PANEL: CPT

## 2019-10-14 PROCEDURE — 85025 COMPLETE CBC W/AUTO DIFF WBC: CPT

## 2019-10-14 PROCEDURE — 36415 COLL VENOUS BLD VENIPUNCTURE: CPT | Mod: PO

## 2019-10-16 ENCOUNTER — PATIENT MESSAGE (OUTPATIENT)
Dept: NEUROLOGY | Facility: CLINIC | Age: 69
End: 2019-10-16

## 2019-10-30 ENCOUNTER — OFFICE VISIT (OUTPATIENT)
Dept: FAMILY MEDICINE | Facility: CLINIC | Age: 69
End: 2019-10-30
Payer: MEDICARE

## 2019-10-30 VITALS
HEIGHT: 66 IN | TEMPERATURE: 98 F | OXYGEN SATURATION: 95 % | BODY MASS INDEX: 43.12 KG/M2 | DIASTOLIC BLOOD PRESSURE: 82 MMHG | WEIGHT: 268.31 LBS | SYSTOLIC BLOOD PRESSURE: 116 MMHG | HEART RATE: 92 BPM

## 2019-10-30 DIAGNOSIS — R73.9 HYPERGLYCEMIA: ICD-10-CM

## 2019-10-30 DIAGNOSIS — G35 MS (MULTIPLE SCLEROSIS): Primary | ICD-10-CM

## 2019-10-30 DIAGNOSIS — Z12.11 COLON CANCER SCREENING: ICD-10-CM

## 2019-10-30 DIAGNOSIS — E55.9 VITAMIN D DEFICIENCY: ICD-10-CM

## 2019-10-30 DIAGNOSIS — Z23 FLU VACCINE NEED: ICD-10-CM

## 2019-10-30 PROCEDURE — 99999 PR PBB SHADOW E&M-EST. PATIENT-LVL IV: ICD-10-PCS | Mod: PBBFAC,,, | Performed by: NURSE PRACTITIONER

## 2019-10-30 PROCEDURE — 99214 OFFICE O/P EST MOD 30 MIN: CPT | Mod: PBBFAC,PO,25 | Performed by: NURSE PRACTITIONER

## 2019-10-30 PROCEDURE — 90662 IIV NO PRSV INCREASED AG IM: CPT | Mod: PBBFAC,PO

## 2019-10-30 PROCEDURE — 99999 PR PBB SHADOW E&M-EST. PATIENT-LVL IV: CPT | Mod: PBBFAC,,, | Performed by: NURSE PRACTITIONER

## 2019-10-30 PROCEDURE — 99213 PR OFFICE/OUTPT VISIT, EST, LEVL III, 20-29 MIN: ICD-10-PCS | Mod: S$PBB,,, | Performed by: NURSE PRACTITIONER

## 2019-10-30 PROCEDURE — 99213 OFFICE O/P EST LOW 20 MIN: CPT | Mod: S$PBB,,, | Performed by: NURSE PRACTITIONER

## 2019-11-14 ENCOUNTER — HOSPITAL ENCOUNTER (OUTPATIENT)
Dept: RADIOLOGY | Facility: HOSPITAL | Age: 69
Discharge: HOME | End: 2019-11-14
Attending: PHYSICIAN ASSISTANT
Payer: MEDICARE

## 2019-11-14 DIAGNOSIS — G35 MULTIPLE SCLEROSIS: ICD-10-CM

## 2019-11-14 PROCEDURE — 70551 MRI BRAIN DEMYELINATING WITHOUT CONTRAST: ICD-10-PCS | Mod: 26,,, | Performed by: RADIOLOGY

## 2019-11-14 PROCEDURE — 70551 MRI BRAIN STEM W/O DYE: CPT | Mod: 26,,, | Performed by: RADIOLOGY

## 2019-11-14 PROCEDURE — 70551 MRI BRAIN STEM W/O DYE: CPT | Mod: TC

## 2019-11-15 ENCOUNTER — TELEPHONE (OUTPATIENT)
Dept: NEUROLOGY | Facility: CLINIC | Age: 69
End: 2019-11-15

## 2019-11-15 DIAGNOSIS — G35 MULTIPLE SCLEROSIS: Primary | ICD-10-CM

## 2019-11-15 NOTE — TELEPHONE ENCOUNTER
----- Message from Fay Snyder PA-C sent at 11/15/2019  4:38 PM CST -----  Mild progression since prior MRI a year ago-would like to check LEIGHANN Parks- please assist with scheduling

## 2019-11-20 ENCOUNTER — PATIENT OUTREACH (OUTPATIENT)
Dept: ADMINISTRATIVE | Facility: OTHER | Age: 69
End: 2019-11-20

## 2019-11-20 ENCOUNTER — LAB VISIT (OUTPATIENT)
Dept: LAB | Facility: HOSPITAL | Age: 69
End: 2019-11-20
Attending: PHYSICIAN ASSISTANT
Payer: MEDICARE

## 2019-11-20 DIAGNOSIS — G35 MULTIPLE SCLEROSIS: ICD-10-CM

## 2019-11-20 PROCEDURE — 36415 COLL VENOUS BLD VENIPUNCTURE: CPT | Mod: PO

## 2019-11-20 PROCEDURE — 86382 NEUTRALIZATION TEST VIRAL: CPT

## 2019-11-25 ENCOUNTER — OFFICE VISIT (OUTPATIENT)
Dept: NEUROLOGY | Facility: CLINIC | Age: 69
End: 2019-11-25
Payer: MEDICARE

## 2019-11-25 VITALS
WEIGHT: 268.63 LBS | SYSTOLIC BLOOD PRESSURE: 129 MMHG | HEIGHT: 66 IN | DIASTOLIC BLOOD PRESSURE: 80 MMHG | BODY MASS INDEX: 43.17 KG/M2 | HEART RATE: 77 BPM

## 2019-11-25 DIAGNOSIS — R53.83 FATIGUE, UNSPECIFIED TYPE: ICD-10-CM

## 2019-11-25 DIAGNOSIS — G35 MULTIPLE SCLEROSIS: Primary | ICD-10-CM

## 2019-11-25 DIAGNOSIS — R20.8 VIBRATION SENSORY LOSS: ICD-10-CM

## 2019-11-25 DIAGNOSIS — E55.9 VITAMIN D INSUFFICIENCY: ICD-10-CM

## 2019-11-25 DIAGNOSIS — Z71.89 COUNSELING REGARDING GOALS OF CARE: ICD-10-CM

## 2019-11-25 DIAGNOSIS — Z79.899 ENCOUNTER FOR LONG-TERM (CURRENT) USE OF HIGH-RISK MEDICATION: ICD-10-CM

## 2019-11-25 PROBLEM — Z29.89 PROPHYLACTIC IMMUNOTHERAPY: Status: RESOLVED | Noted: 2018-10-16 | Resolved: 2019-11-25

## 2019-11-25 PROCEDURE — 99999 PR PBB SHADOW E&M-EST. PATIENT-LVL III: CPT | Mod: PBBFAC,,, | Performed by: PHYSICIAN ASSISTANT

## 2019-11-25 PROCEDURE — 1159F PR MEDICATION LIST DOCUMENTED IN MEDICAL RECORD: ICD-10-PCS | Mod: ,,, | Performed by: PHYSICIAN ASSISTANT

## 2019-11-25 PROCEDURE — 99215 OFFICE O/P EST HI 40 MIN: CPT | Mod: S$PBB,,, | Performed by: PHYSICIAN ASSISTANT

## 2019-11-25 PROCEDURE — 1126F AMNT PAIN NOTED NONE PRSNT: CPT | Mod: ,,, | Performed by: PHYSICIAN ASSISTANT

## 2019-11-25 PROCEDURE — 99213 OFFICE O/P EST LOW 20 MIN: CPT | Mod: PBBFAC | Performed by: PHYSICIAN ASSISTANT

## 2019-11-25 PROCEDURE — 1126F PR PAIN SEVERITY QUANTIFIED, NO PAIN PRESENT: ICD-10-PCS | Mod: ,,, | Performed by: PHYSICIAN ASSISTANT

## 2019-11-25 PROCEDURE — 99999 PR PBB SHADOW E&M-EST. PATIENT-LVL III: ICD-10-PCS | Mod: PBBFAC,,, | Performed by: PHYSICIAN ASSISTANT

## 2019-11-25 PROCEDURE — 99215 PR OFFICE/OUTPT VISIT, EST, LEVL V, 40-54 MIN: ICD-10-PCS | Mod: S$PBB,,, | Performed by: PHYSICIAN ASSISTANT

## 2019-11-25 PROCEDURE — 1159F MED LIST DOCD IN RCRD: CPT | Mod: ,,, | Performed by: PHYSICIAN ASSISTANT

## 2019-11-25 RX ORDER — METHYLPREDNISOLONE SODIUM SUCCINATE 1 G/16ML
INJECTION INTRAMUSCULAR; INTRAVENOUS
Qty: 2000 MG | Refills: 0 | Status: SHIPPED | OUTPATIENT
Start: 2019-11-25 | End: 2019-11-25 | Stop reason: CLARIF

## 2019-11-25 RX ORDER — INTERFERON BETA-1A 30MCG/.5ML
30 KIT INTRAMUSCULAR
Qty: 4 PEN | Refills: 0 | Status: SHIPPED | OUTPATIENT
Start: 2019-11-25 | End: 2019-12-20

## 2019-11-25 RX ORDER — METHYLPREDNISOLONE SODIUM SUCCINATE 1 G/16ML
INJECTION INTRAMUSCULAR; INTRAVENOUS
Qty: 2000 MG | Refills: 0 | Status: SHIPPED | OUTPATIENT
Start: 2019-11-25 | End: 2020-01-14 | Stop reason: ALTCHOICE

## 2019-11-25 NOTE — PROGRESS NOTES
"Subjective:        Patient ID: Naomi Toledo is a 69 y.o. female who presents today with her  for a fit in clinic visit for MS.     MS HPI:  · DMT: interferon beta-1a IM  · Side effects from DMT? No  · Taking vitamin D3 as recommended? Yes - 5,000IU/d    · Patient states that she has been feeling well up until this last week. She feels "dull annoying discomfort at top of my head" and tingling sensation down L side intermittent. She has had significant fatigue issues this last 7-10 days and requiring a nap in the afternoons.  · Planning to move in the spring 2020 to [place])  · She states she has been much more active in the last months  Getting their house ready to put on the market to sell, and experiencing worsening knee pain with packing.   · To be remembered, she was initailly on Betaseron, and now Avonex-she has not been on any other DMT.   · Presents today to discuss recent MRI showing progression. She states consistency with DMT weekly    Medications:  Current Outpatient Medications   Medication Sig    AVONEX 30 mcg/0.5 mL PnKt Inject 0.5 mLs (30 mcg total) into the muscle every 7 days.    cholecalciferol, vitamin D3, (VITAMIN D3) 5,000 unit Tab Take 5,000 Units by mouth once daily.     No current facility-administered medications for this visit.        SOCIAL HISTORY  Social History     Tobacco Use    Smoking status: Never Smoker    Smokeless tobacco: Never Used   Substance Use Topics    Alcohol use: No     Frequency: Never    Drug use: No       Living arrangements - the patient lives with their spouse.    MS ROS:    MS REVIEW OF SYMPTOMS 11/24/2019   Do you feel abnormally tired on most days? Yes   Do you feel you generally sleep well? Yes   Do you have difficulty controlling your bladder?  Yes   Do you have difficulty controlling your bowels?  Yes   Do you have frequent muscle cramps, tightness or spasms in your limbs?  No   Do you have new visual symptoms?  No   Do you have " worsening difficulty with your memory or thinking? No   Do you have worsening symptoms of anxiety or depression?  No   For patients who walk, Do you have more difficulty walking?  No   Have you fallen since your last visit?  No   For patients who use wheelchairs: Do you have any skin wounds or breakdown? No   Do you have difficulty using your hands?  Yes   Do you have shooting or burning pain? No   Do you have difficulty with sexual function?  No   If you are sexually active, are you using birth control? Y/N  N/A No   Do you often choke when swallowing liquids or solid food?  No   Do you experience worsening symptoms when overheated? No   Do you need any new equipment such as a wheelchair, walker or shower chair? No   Do you receive co-pay financial assistance for your principal MS medicine? No   Would you be interested in participating in an MS research trial in the future? No   Do you feel you have adequate family/friend support?  Yes   Do you have health insurance?   Yes   Are you currently employed? No   Do you receive SSDI/SSI?  No   Do you use marijuana or cannabis products? No   Have you been diagnosed with a urinary tract infection since your last visit here? No   Have you been diagnosed with a respiratory tract infection since your last visit here? No   Have you been to the emergency room since your last visit here? No   Have you been hospitalized since your last visit here?  No     FSS SCORE & INTERPRETATION 11/24/2019   FSS SCORE  30   FSS SCORE INTERPRETATION May not be suffering from fatigue     MS ADA-D SCORE & INTERPRETATION 11/24/2019   ADA-D SCORE  0   ADA-D INTERPRETATION  No indication of Depression     MS KELTON-7 SCORE & INTERPRETATION 11/24/2019   KELTON-7 SCORE  0   KELTON-7 SCORE INTERPRETATION Normal     PEQ MS MOS PAIN EFFECTS SCORE & INTERPRETATION 11/24/2019   PES SCORE 9   PES SCORE INTERPRETATION Scores can range from 6-30.  Items are scaled so that higher scores indicate a greater impact of  pain on a patients mood and behavior.     PEQ MS SEXUAL SATISFACTION SCORE & INTERPRETATION 11/24/2019   SSS SCORE  5   SSS SCORE INTERPRETATION Scores can range from 4-24.  Higher scores indicate greater problems with sexual satisfaction.     MS BLADDER CONTROL SCORE & INTERPRETATION 11/24/2019   BLCS SCORE 4   BLCS SCORE INTERPRETATION  Scores can range from 0-22, with higher scores indicating greater bladder control problems.     MS BOWEL CONTROL SCORE & INTERPRETATION 11/24/2019   BWCS SCORE 0   BWCS SCORE INTERPRETATION Scores can range from 0-26, with higher scores indicating greater bowel control problems.     PEQ MS IMPACT OF VISUAL IMPAIRMENT SCORE & INTERPRETATION 11/24/2019   MADELEINE SCALE SCORE  0   MADELEINE SCORE INTERPRETATION Scores can range from 0-15, with higher scores indicating greater impact of visual problems on daily activites.     MS PDQ SCORE & INTERPRETATION 11/24/2019   PDQ RETROSPECTIVE MEMORY SUBSCALE 3   PDQ ATTENTION/CONCENTRATION SUBSCALE 2   PDQ PROSPECTIVE MEMORY SUBSCALE 2   PDQ PLANNING/ORGANIZATION SUBSCALE 0   PDQ TOTAL SCORE 7   PDQ SCORE INTERPRETATION Scores can range from 0-80, with higher scores indicating greater perceived cognitive impairment.     MSSS SCORE & INTERPRETATION 11/24/2019   MSSS TANGIBLE SUPPORT SUBSCALE 75   MSSS EMOTIONAL/INFORMATIONAL SUPPORT SUBSCALE 93.75   MSSS AFFECTIONATE SUPPORT SUBSCALE 91.67   MSSS POSITIVE SOCIAL INTERACTION SUBSCALE 75   MSSS TOTAL SCORE 83.86   MSSS SCORE INTERPRETATION Scores can range from 0-100, with higher scores indicating greater perceived support.              Objective:        1. 25 foot timed walk:4.8s last visit in June 2019; 4.4s/4.7s previous visit  Timed 25 Foot Walk: 9/15/2017 11/25/2019   Did patient wear an AFO? No No   Was assistive device used? No No   Time for 25 Foot Walk (seconds) 4.7 5.2   Time for 25 Foot Walk (seconds) 4.7 -         Neurologic Exam     Mental Status   Oriented to person, place, and time.    Follows 3 step commands.   Speech: speech is normal   Level of consciousness: alert  Normal comprehension.     Cranial Nerves     CN II   Visual acuity: normal with correction (20/20 OD and OS with Snellen hand held chart at 6 ft)    CN III, IV, VI   Pupils are equal, round, and reactive to light.  Extraocular motions are normal.     CN V   Facial sensation intact.     CN VII   Facial expression full, symmetric.     CN VIII   Hearing: intact (finger rub)    CN IX, X   Palate: symmetric    CN XI   CN XI normal.     CN XII   Tongue deviation: none    Motor Exam   Muscle bulk: normal  Overall muscle tone: normal    Strength   Right deltoid: 5/5  Left deltoid: 5/5  Right triceps: 5/5  Left triceps: 5/5  Right wrist extension: 5/5  Left wrist extension: 5/5  Right interossei: 5/5  Left interossei: 5/5  Right iliopsoas: 5/5  Left iliopsoas: 5/5  Right hamstrin/5  Left hamstrin/5  Right anterior tibial: 5/5  Left anterior tibial: 5/5  Right peroneal: 5/5  Left peroneal: 5/5    Sensory Exam   Light touch normal.   Right arm vibration: normal  Left arm vibration: decreased from wrist  Right leg vibration: decreased from ankle  Left leg vibration: decreased from toes    Gait, Coordination, and Reflexes     Gait  Gait: (antalgic in nature)    Coordination   Finger to nose coordination: normal  Tandem walking coordination: abnormal    Tremor   Resting tremor: tremor L thumb.  Action tremor: absent    Reflexes   Right brachioradialis: 2+  Left brachioradialis: 2+  Right biceps: 2+  Left biceps: 2+  Right triceps: 2+  Left triceps: 2+  Right patellar: 2+  Left patellar: 2+  Right achilles: 2+  Left achilles: 2+  Right plantar: normal  Left plantar: normal  Right ankle clonus: absent  Left ankle clonus: absent  Right pendular knee jerk: absent  Left pendular knee jerk: absentDifficulty with Heel/toe walk  Impaired L hand RSM         Imaging:     Results for orders placed during the hospital encounter of 19   MRI  Brain Demyelinating Without Contrast    Impression Multiple foci of white matter abnormality consistent with the patient's given history of multiple sclerosis.  No diffusion positivity is identified on today's examination to suggest active demyelination.  Mild progression of T2 signal abnormality is suspected since the prior with a more obvious focus in the subcortical left frontal lobe.      Electronically signed by: Denny Johnson MD  Date:    11/14/2019  Time:    15:06     No results found for this or any previous visit.  No results found for this or any previous visit.  Results for orders placed during the hospital encounter of 11/13/18   MRI Brain Demyelinating W W/O Contrast    Impression 1. There is a stable burden of white matter disease scattered throughout the periventricular, pericallosal and subcortical white matter.  There are no new regions of signal abnormality in the brain.  There is no restricted diffusion or pathologic enhancement.  There are no findings to suggest progression of demyelinating disease.  2. There is no hemorrhage, mass effect, acute infarction or abnormal enhancement elsewhere in the brain.      Electronically signed by: Osmani Grullon MD  Date:    11/13/2018  Time:    13:28     Results for orders placed during the hospital encounter of 11/13/18   MRI Cervical Spine Demyelinating W W/O Contrast    Impression 1. Stable appearance of the cervical spine and cervical cord when compared to prior MRI dated 05/16/2014.  There are 2 lesions in the dorsal cord.  These are located at the level of C3 and C5.  These are unchanged and consistent with the provided diagnosis of demyelination.  There are no definite new regions of signal abnormality in the cord and there is no pathologic enhancement to suggest active or progressive disease.  There may also be a very tiny lesion of the right lateral cord which is unchanged.  2. There is only very mild degenerative change in the cervical spine.   There is, however, no spinal canal or significant foraminal stenosis at any level and there is no change.      Electronically signed by: Osmani Grullon MD  Date:    11/16/2018  Time:    16:43     No results found for this or any previous visit.      Labs:     Lab Results   Component Value Date    BXHRATSV96LK 62 10/14/2019    SZODNWDD41ZL 59 04/30/2019    TBBYALNJ30ZR 64 03/08/2018     No results found for: JCVINDEX, JCVANTIBODY  No results found for: DH2TVLWP, ABSOLUTECD3, UD6PAGFD, ABSOLUTECD8, SW6QCTUZ, ABSOLUTECD4, LABCD48  Lab Results   Component Value Date    WBC 5.06 10/14/2019    RBC 4.92 10/14/2019    HGB 14.8 10/14/2019    HCT 43.0 10/14/2019    MCV 87 10/14/2019    MCH 30.1 10/14/2019    MCHC 34.4 10/14/2019    RDW 12.0 10/14/2019     10/14/2019    MPV 11.0 10/14/2019    GRAN 3.8 10/14/2019    GRAN 75.0 (H) 10/14/2019    LYMPH 0.6 (L) 10/14/2019    LYMPH 12.3 (L) 10/14/2019    MONO 0.5 10/14/2019    MONO 9.1 10/14/2019    EOS 0.1 10/14/2019    BASO 0.03 10/14/2019    EOSINOPHIL 2.6 10/14/2019    BASOPHIL 0.6 10/14/2019     Sodium   Date Value Ref Range Status   04/30/2019 139 136 - 145 mmol/L Final     Potassium   Date Value Ref Range Status   04/30/2019 4.2 3.5 - 5.1 mmol/L Final     Chloride   Date Value Ref Range Status   04/30/2019 105 95 - 110 mmol/L Final     CO2   Date Value Ref Range Status   04/30/2019 23 23 - 29 mmol/L Final     Glucose   Date Value Ref Range Status   04/30/2019 93 70 - 110 mg/dL Final     BUN, Bld   Date Value Ref Range Status   04/30/2019 24 (H) 8 - 23 mg/dL Final     Creatinine   Date Value Ref Range Status   04/30/2019 1.1 0.5 - 1.4 mg/dL Final     Calcium   Date Value Ref Range Status   04/30/2019 9.7 8.7 - 10.5 mg/dL Final     Total Protein   Date Value Ref Range Status   10/14/2019 6.9 6.0 - 8.4 g/dL Final     Albumin   Date Value Ref Range Status   10/14/2019 3.8 3.5 - 5.2 g/dL Final     Total Bilirubin   Date Value Ref Range Status   10/14/2019 0.9 0.1 - 1.0  mg/dL Final     Comment:     For infants and newborns, interpretation of results should be based  on gestational age, weight and in agreement with clinical  observations.  Premature Infant recommended reference ranges:  Up to 24 hours.............<8.0 mg/dL  Up to 48 hours............<12.0 mg/dL  3-5 days..................<15.0 mg/dL  6-29 days.................<15.0 mg/dL       Alkaline Phosphatase   Date Value Ref Range Status   10/14/2019 70 55 - 135 U/L Final     AST   Date Value Ref Range Status   10/14/2019 35 10 - 40 U/L Final     ALT   Date Value Ref Range Status   10/14/2019 39 10 - 44 U/L Final     Anion Gap   Date Value Ref Range Status   04/30/2019 11 8 - 16 mmol/L Final     eGFR if    Date Value Ref Range Status   04/30/2019 >60.0 >60 mL/min/1.73 m^2 Final     eGFR if non    Date Value Ref Range Status   04/30/2019 52.1 (A) >60 mL/min/1.73 m^2 Final     Comment:     Calculation used to obtain the estimated glomerular filtration  rate (eGFR) is the CKD-EPI equation.         No results found for: HEPBSAG, HEPBSAB, HEPBCAB      Impression:       1.  Multiple Sclerosis  · Assessment: Patient presents today due to instability of MRI. Her timed walk is slightly slower; however, this is confounded by worsening bilateral arthritic knee pain. She does appear to have worsening vibratory sense L UE and R LE and significant worsening fatigue-I'd like to assess for changes in C-spine as well(order placed)  · Imaging:  MRI brain shows worsening T2 lesions(two) L frontal lobe subcortical region. Reviewed images with patient and  today.              Disease Modifying Therapies: NAB lab is still pending(if positive will need to change DMT)-will await results of C-spine as well to make definitive decision regarding change of DMT. To be recalled she was diagnosed in 2002 and on interferon majority of this time and stable(short period after diagnosis without DMT). For now-I have sent  in 2 days of high dose oral smoothie steroids(she was advised that she may take 1 day or 2). Discussed side effect profile along with possibility of decadron as alternative(patient preference for solumedrol). Continue high dose D3(5,000IU M-F, and 10,000 on weekends).       Over 50% of this 40 minute visit was spent in direct face to face counseling of the patient and her  about MS, DMT considerations, and MS symptom management.   Follow up in about 2 weeks (around 12/9/2019) for follow up with me. via video visit  Patient agreed to POC today.    Attending, Dr. Roth, was available during today's encounter.     Fay Snyder PA-C  [facility]    Problem List Items Addressed This Visit        Other    Encounter for long-term (current) use of high-risk medication      Other Visit Diagnoses     Multiple sclerosis    -  Primary    Relevant Medications    AVONEX 30 mcg/0.5 mL PnKt    methylPREDNISolone sodium succinate (SOLU-MEDROL) 1,000 mg injection    Other Relevant Orders    MRI Cervical Spine Demyelinating W W/O Contrast    Counseling regarding goals of care        Vibration sensory loss        C-spine MRI ordered    Relevant Medications    methylPREDNISolone sodium succinate (SOLU-MEDROL) 1,000 mg injection    Fatigue, unspecified type        Vitamin D insufficiency

## 2019-12-02 ENCOUNTER — PATIENT MESSAGE (OUTPATIENT)
Dept: NEUROLOGY | Facility: CLINIC | Age: 69
End: 2019-12-02

## 2019-12-03 ENCOUNTER — TELEPHONE (OUTPATIENT)
Dept: NEUROLOGY | Facility: CLINIC | Age: 69
End: 2019-12-03

## 2019-12-03 DIAGNOSIS — G35 MULTIPLE SCLEROSIS: Primary | ICD-10-CM

## 2019-12-03 NOTE — TELEPHONE ENCOUNTER
----- Message from Debi Jules sent at 12/2/2019  7:21 AM CST -----  Good morning   PT is here to have an MRI and needs to have a creatinine lab. Can we get an order for that please   Debi Jules  [phone]

## 2019-12-05 ENCOUNTER — PATIENT MESSAGE (OUTPATIENT)
Dept: NEUROLOGY | Facility: CLINIC | Age: 69
End: 2019-12-05

## 2019-12-14 ENCOUNTER — HOSPITAL ENCOUNTER (OUTPATIENT)
Dept: RADIOLOGY | Facility: HOSPITAL | Age: 69
Discharge: HOME | End: 2019-12-14
Attending: PHYSICIAN ASSISTANT
Payer: MEDICARE

## 2019-12-14 PROCEDURE — 72156 MRI NECK SPINE W/O & W/DYE: CPT | Mod: TC

## 2019-12-14 PROCEDURE — 72156 MRI CERVICAL SPINE DEMYELINATING W W/O CONTRAST: ICD-10-PCS | Mod: 26,,, | Performed by: RADIOLOGY

## 2019-12-14 PROCEDURE — A9585 GADOBUTROL INJECTION: HCPCS | Performed by: PHYSICIAN ASSISTANT

## 2019-12-14 PROCEDURE — 25500020 PHARM REV CODE 255: Performed by: PHYSICIAN ASSISTANT

## 2019-12-14 PROCEDURE — 72156 MRI NECK SPINE W/O & W/DYE: CPT | Mod: 26,,, | Performed by: RADIOLOGY

## 2019-12-14 RX ORDER — GADOBUTROL 604.72 MG/ML
10 INJECTION INTRAVENOUS
Status: COMPLETED | OUTPATIENT
Start: 2019-12-14 | End: 2019-12-14

## 2019-12-14 RX ADMIN — GADOBUTROL 10 ML: 604.72 INJECTION INTRAVENOUS at 08:12

## 2019-12-16 ENCOUNTER — PATIENT OUTREACH (OUTPATIENT)
Dept: ADMINISTRATIVE | Facility: OTHER | Age: 69
End: 2019-12-16

## 2019-12-16 ENCOUNTER — PATIENT MESSAGE (OUTPATIENT)
Dept: NEUROLOGY | Facility: CLINIC | Age: 69
End: 2019-12-16

## 2019-12-17 LAB — NABFERON ANTIBODY TEST: NORMAL

## 2019-12-20 ENCOUNTER — PATIENT MESSAGE (OUTPATIENT)
Dept: NEUROLOGY | Facility: CLINIC | Age: 69
End: 2019-12-20

## 2019-12-20 ENCOUNTER — PATIENT MESSAGE (OUTPATIENT)
Dept: FAMILY MEDICINE | Facility: CLINIC | Age: 69
End: 2019-12-20

## 2019-12-20 ENCOUNTER — HOSPITAL ENCOUNTER (OUTPATIENT)
Dept: RADIOLOGY | Facility: CLINIC | Age: 69
Discharge: HOME | End: 2019-12-20
Attending: NURSE PRACTITIONER
Payer: MEDICARE

## 2019-12-20 ENCOUNTER — TELEPHONE (OUTPATIENT)
Dept: FAMILY MEDICINE | Facility: CLINIC | Age: 69
End: 2019-12-20

## 2019-12-20 DIAGNOSIS — G35 MULTIPLE SCLEROSIS: ICD-10-CM

## 2019-12-20 DIAGNOSIS — T17.928A ASPIRATION OF FOOD, INITIAL ENCOUNTER: Primary | ICD-10-CM
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
## 2019-12-20 DIAGNOSIS — T17.928A ASPIRATION OF FOOD, INITIAL ENCOUNTER: ICD-10-CM
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
## 2019-12-20 PROCEDURE — 71046 X-RAY EXAM CHEST 2 VIEWS: CPT | Mod: TC,FY,PO

## 2019-12-20 PROCEDURE — 71046 X-RAY EXAM CHEST 2 VIEWS: CPT | Mod: 26,,, | Performed by: RADIOLOGY

## 2019-12-20 PROCEDURE — 71046 XR CHEST PA AND LATERAL: ICD-10-PCS | Mod: 26,,, | Performed by: RADIOLOGY

## 2019-12-20 RX ORDER — INTERFERON BETA-1A 30MCG/.5ML
KIT INTRAMUSCULAR
Qty: 12 PEN | Refills: 1 | Status: SHIPPED | OUTPATIENT
Start: 2019-12-20 | End: 2019-12-23 | Stop reason: SDUPTHER

## 2019-12-20 RX ORDER — INTERFERON BETA-1A 30MCG/.5ML
KIT INTRAMUSCULAR
Qty: 12 PEN | Refills: 1 | Status: SHIPPED | OUTPATIENT
Start: 2019-12-20 | End: 2019-12-20 | Stop reason: SDUPTHER

## 2019-12-23 ENCOUNTER — PATIENT MESSAGE (OUTPATIENT)
Dept: NEUROLOGY | Facility: CLINIC | Age: 69
End: 2019-12-23

## 2019-12-23 DIAGNOSIS — G35 MULTIPLE SCLEROSIS: ICD-10-CM

## 2019-12-23 RX ORDER — INTERFERON BETA-1A 30MCG/.5ML
KIT INTRAMUSCULAR
Qty: 12 PEN | Refills: 1 | Status: SHIPPED | OUTPATIENT
Start: 2019-12-23 | End: 2020-05-21 | Stop reason: SDUPTHER

## 2019-12-30 ENCOUNTER — PATIENT OUTREACH (OUTPATIENT)
Dept: ADMINISTRATIVE | Facility: HOSPITAL | Age: 69
End: 2019-12-30

## 2020-01-14 ENCOUNTER — OFFICE VISIT (OUTPATIENT)
Dept: FAMILY MEDICINE | Facility: CLINIC | Age: 70
End: 2020-01-14
Payer: MEDICARE

## 2020-01-14 VITALS
OXYGEN SATURATION: 96 % | DIASTOLIC BLOOD PRESSURE: 80 MMHG | TEMPERATURE: 98 F | SYSTOLIC BLOOD PRESSURE: 110 MMHG | WEIGHT: 270.94 LBS | BODY MASS INDEX: 43.54 KG/M2 | HEART RATE: 86 BPM | RESPIRATION RATE: 14 BRPM | HEIGHT: 66 IN

## 2020-01-14 DIAGNOSIS — Z12.11 COLON CANCER SCREENING: ICD-10-CM

## 2020-01-14 DIAGNOSIS — R73.9 HYPERGLYCEMIA: Primary | ICD-10-CM

## 2020-01-14 DIAGNOSIS — L71.9 ACNE ROSACEA: ICD-10-CM

## 2020-01-14 DIAGNOSIS — G35 MS (MULTIPLE SCLEROSIS): ICD-10-CM

## 2020-01-14 DIAGNOSIS — E66.01 MORBID OBESITY WITH BMI OF 40.0-44.9, ADULT: ICD-10-CM

## 2020-01-14 DIAGNOSIS — R21 RASH: ICD-10-CM

## 2020-01-14 DIAGNOSIS — Z79.899 ENCOUNTER FOR LONG-TERM (CURRENT) USE OF HIGH-RISK MEDICATION: ICD-10-CM

## 2020-01-14 PROCEDURE — 99999 PR PBB SHADOW E&M-EST. PATIENT-LVL IV: ICD-10-PCS | Mod: PBBFAC,,, | Performed by: FAMILY MEDICINE

## 2020-01-14 PROCEDURE — 1159F MED LIST DOCD IN RCRD: CPT | Mod: ,,, | Performed by: FAMILY MEDICINE

## 2020-01-14 PROCEDURE — 99214 OFFICE O/P EST MOD 30 MIN: CPT | Mod: S$PBB,,, | Performed by: FAMILY MEDICINE

## 2020-01-14 PROCEDURE — 1126F AMNT PAIN NOTED NONE PRSNT: CPT | Mod: ,,, | Performed by: FAMILY MEDICINE

## 2020-01-14 PROCEDURE — 99999 PR PBB SHADOW E&M-EST. PATIENT-LVL IV: CPT | Mod: PBBFAC,,, | Performed by: FAMILY MEDICINE

## 2020-01-14 PROCEDURE — 99214 PR OFFICE/OUTPT VISIT, EST, LEVL IV, 30-39 MIN: ICD-10-PCS | Mod: S$PBB,,, | Performed by: FAMILY MEDICINE

## 2020-01-14 PROCEDURE — 99214 OFFICE O/P EST MOD 30 MIN: CPT | Mod: PBBFAC,PO | Performed by: FAMILY MEDICINE

## 2020-01-14 PROCEDURE — 1126F PR PAIN SEVERITY QUANTIFIED, NO PAIN PRESENT: ICD-10-PCS | Mod: ,,, | Performed by: FAMILY MEDICINE

## 2020-01-14 PROCEDURE — 1159F PR MEDICATION LIST DOCUMENTED IN MEDICAL RECORD: ICD-10-PCS | Mod: ,,, | Performed by: FAMILY MEDICINE

## 2020-01-14 RX ORDER — METRONIDAZOLE 7.5 MG/G
GEL TOPICAL 2 TIMES DAILY
Qty: 45 G | Refills: 3 | Status: SHIPPED | OUTPATIENT
Start: 2020-01-14 | End: 2020-03-18

## 2020-01-14 NOTE — PROGRESS NOTES
Subjective:       Patient ID: Naomi Toledo is a 69 y.o. female.    Chief Complaint: Establish Care    HPI  Review of Systems   Constitutional: Negative for activity change and unexpected weight change.   HENT: Negative for hearing loss, rhinorrhea and trouble swallowing.    Eyes: Negative for discharge and visual disturbance.   Respiratory: Negative for chest tightness and wheezing.    Cardiovascular: Negative for chest pain and palpitations.   Gastrointestinal: Positive for diarrhea. Negative for blood in stool, constipation and vomiting.   Endocrine: Negative for polydipsia and polyuria.   Genitourinary: Negative for difficulty urinating, dysuria, hematuria and menstrual problem.   Musculoskeletal: Negative for arthralgias, joint swelling and neck pain.   Neurological: Negative for weakness and headaches.   Psychiatric/Behavioral: Negative for confusion and dysphoric mood.       Patient Active Problem List   Diagnosis    Hyperglycemia    MS (multiple sclerosis)    Positive NURIA (antinuclear antibody)    Encounter for long-term (current) use of high-risk medication    Urinary urgency    Morbid obesity with BMI of 40.0-44.9, adult    Acne rosacea     Patient is here to establish care  GI Dr. Rich colonoscopy 10 year screening is planned before move to [facility] managed by Neuro Dr. Roth  Objective:      Physical Exam   Constitutional: She is oriented to person, place, and time. She appears well-developed and well-nourished.   Cardiovascular: Normal rate, regular rhythm and normal heart sounds.   Pulmonary/Chest: Effort normal and breath sounds normal.   Musculoskeletal: She exhibits no edema.   Neurological: She is alert and oriented to person, place, and time.   Skin: Skin is warm and dry.   Psychiatric: She has a normal mood and affect.   Nursing note and vitals reviewed.      Assessment:       1. Hyperglycemia    2. Rash    3. Acne rosacea    4. MS (multiple sclerosis)    5. Encounter for  "long-term (current) use of high-risk medication    6. Morbid obesity with BMI of 40.0-44.9, adult    7. Colon cancer screening        Plan:         1. Hyperglycemia   Your blood sugar is borderline high.  This means you are at risk for developing type 2 diabetes mellitus.  To lessen your risk you should exercise regularly, avoid excess carbohydrates and work toward a body mass index of less than 25.    A1c 6.7. F/u with new PCP within 6 months  - Comprehensive metabolic panel; Future  - Hemoglobin A1c; Future    2. Rash  Apply to clean skin  - metroNIDAZOLE (METROGEL) 0.75 % gel; Apply topically 2 (two) times daily.  Dispense: 45 g; Refill: 3    3. Acne rosacea  Apply as directed  - metroNIDAZOLE (METROGEL) 0.75 % gel; Apply topically 2 (two) times daily.  Dispense: 45 g; Refill: 3  If ineffective try to get minocycline covered    4. MS (multiple sclerosis)  Cont neuro care    5. Encounter for long-term (current) use of high-risk medication  Cont neuro care and monitoring    6. Morbid obesity with BMI of 40.0-44.9, adult  Counseled patient on his ideal body weight, health consequences of being obese and current recommendations including weekly exercise and a heart healthy diet.  Current BMI is:Estimated body mass index is 43.73 kg/m² as calculated from the following:    Height as of this encounter: 5' 6" (1.676 m).    Weight as of this encounter: 122.9 kg (270 lb 15.1 oz)..  Patient is aware that ideal BMI < 25 or Weight in (lb) to have BMI = 25: 154.6.        7. Colon cancer screening  Screening as planned         Time spent with patient: 20 minutes    Patient with be reevaluated in establish with new PCP asap in [place] or sooner prn    Greater than 50% of this visit was spent counseling as described in above documentation:Yes  "

## 2020-01-16 ENCOUNTER — LAB VISIT (OUTPATIENT)
Dept: LAB | Facility: HOSPITAL | Age: 70
End: 2020-01-16
Attending: FAMILY MEDICINE
Payer: MEDICARE

## 2020-01-16 DIAGNOSIS — R73.9 HYPERGLYCEMIA: ICD-10-CM

## 2020-01-16 LAB
ALBUMIN SERPL BCP-MCNC: 3.9 G/DL (ref 3.5–5.2)
ALP SERPL-CCNC: 69 U/L (ref 55–135)
ALT SERPL W/O P-5'-P-CCNC: 38 U/L (ref 10–44)
ANION GAP SERPL CALC-SCNC: 10 MMOL/L (ref 8–16)
AST SERPL-CCNC: 28 U/L (ref 10–40)
BILIRUB SERPL-MCNC: 0.9 MG/DL (ref 0.1–1)
BUN SERPL-MCNC: 15 MG/DL (ref 8–23)
CALCIUM SERPL-MCNC: 9.4 MG/DL (ref 8.7–10.5)
CHLORIDE SERPL-SCNC: 105 MMOL/L (ref 95–110)
CO2 SERPL-SCNC: 25 MMOL/L (ref 23–29)
CREAT SERPL-MCNC: 1.2 MG/DL (ref 0.5–1.4)
EST. GFR  (AFRICAN AMERICAN): 54 ML/MIN/1.73 M^2
EST. GFR  (NON AFRICAN AMERICAN): 46.9 ML/MIN/1.73 M^2
ESTIMATED AVG GLUCOSE: 146 MG/DL (ref 68–131)
GLUCOSE SERPL-MCNC: 125 MG/DL (ref 70–110)
HBA1C MFR BLD HPLC: 6.7 % (ref 4–5.6)
POTASSIUM SERPL-SCNC: 4.4 MMOL/L (ref 3.5–5.1)
PROT SERPL-MCNC: 6.5 G/DL (ref 6–8.4)
SODIUM SERPL-SCNC: 140 MMOL/L (ref 136–145)

## 2020-01-16 PROCEDURE — 83036 HEMOGLOBIN GLYCOSYLATED A1C: CPT

## 2020-01-16 PROCEDURE — 80053 COMPREHEN METABOLIC PANEL: CPT

## 2020-01-16 PROCEDURE — 36415 COLL VENOUS BLD VENIPUNCTURE: CPT | Mod: PO

## 2020-01-20 PROBLEM — L71.9 ACNE ROSACEA: Status: ACTIVE | Noted: 2020-01-20

## 2020-01-22 ENCOUNTER — PATIENT MESSAGE (OUTPATIENT)
Dept: FAMILY MEDICINE | Facility: CLINIC | Age: 70
End: 2020-01-22

## 2020-01-22 NOTE — TELEPHONE ENCOUNTER
Patient states she has been having a pink discoloration on tissue when she wipes post voiding.  No discoloration in commode with urine. Only noticed when wiping in the vaginal area. Patient denies any burning/pain on urination, frequency, urgency,  abd pain, vaginal itching/discharge or fever. Scheduled patient an appointment to be seen by dr Benedict Friday AM.

## 2020-01-24 ENCOUNTER — HOSPITAL ENCOUNTER (OUTPATIENT)
Dept: RADIOLOGY | Facility: CLINIC | Age: 70
Discharge: HOME | End: 2020-01-24
Attending: FAMILY MEDICINE
Payer: MEDICARE

## 2020-01-24 ENCOUNTER — OFFICE VISIT (OUTPATIENT)
Dept: FAMILY MEDICINE | Facility: CLINIC | Age: 70
End: 2020-01-24
Payer: MEDICARE

## 2020-01-24 VITALS
WEIGHT: 270.06 LBS | HEART RATE: 93 BPM | SYSTOLIC BLOOD PRESSURE: 130 MMHG | BODY MASS INDEX: 43.4 KG/M2 | HEIGHT: 66 IN | TEMPERATURE: 98 F | OXYGEN SATURATION: 97 % | RESPIRATION RATE: 12 BRPM | DIASTOLIC BLOOD PRESSURE: 80 MMHG

## 2020-01-24 DIAGNOSIS — N95.0 POST-MENOPAUSE BLEEDING: ICD-10-CM

## 2020-01-24 DIAGNOSIS — N39.0 URINARY TRACT INFECTION WITHOUT HEMATURIA, SITE UNSPECIFIED: Primary | ICD-10-CM

## 2020-01-24 DIAGNOSIS — N89.8 VAGINAL DISCHARGE: ICD-10-CM

## 2020-01-24 DIAGNOSIS — R82.81 PYURIA: ICD-10-CM

## 2020-01-24 LAB
BILIRUB SERPL-MCNC: NEGATIVE MG/DL
BLOOD URINE, POC: NEGATIVE
COLOR, POC UA: YELLOW
GLUCOSE UR QL STRIP: NORMAL
KETONES UR QL STRIP: NEGATIVE
LEUKOCYTE ESTERASE URINE, POC: ABNORMAL
NITRITE, POC UA: NEGATIVE
PH, POC UA: 5
PROTEIN, POC: ABNORMAL
SPECIFIC GRAVITY, POC UA: 1.01
UROBILINOGEN, POC UA: NORMAL

## 2020-01-24 PROCEDURE — 1126F PR PAIN SEVERITY QUANTIFIED, NO PAIN PRESENT: ICD-10-PCS | Mod: ,,, | Performed by: FAMILY MEDICINE

## 2020-01-24 PROCEDURE — 99213 PR OFFICE/OUTPT VISIT, EST, LEVL III, 20-29 MIN: ICD-10-PCS | Mod: S$PBB,,, | Performed by: FAMILY MEDICINE

## 2020-01-24 PROCEDURE — 87491 CHLMYD TRACH DNA AMP PROBE: CPT

## 2020-01-24 PROCEDURE — 87086 URINE CULTURE/COLONY COUNT: CPT

## 2020-01-24 PROCEDURE — 1126F AMNT PAIN NOTED NONE PRSNT: CPT | Mod: ,,, | Performed by: FAMILY MEDICINE

## 2020-01-24 PROCEDURE — 76856 US PELVIS COMPLETE NON OB: ICD-10-PCS | Mod: 26,,, | Performed by: RADIOLOGY

## 2020-01-24 PROCEDURE — 81001 URINALYSIS AUTO W/SCOPE: CPT | Mod: PBBFAC,PO | Performed by: FAMILY MEDICINE

## 2020-01-24 PROCEDURE — 87481 CANDIDA DNA AMP PROBE: CPT | Mod: 59

## 2020-01-24 PROCEDURE — 1159F PR MEDICATION LIST DOCUMENTED IN MEDICAL RECORD: ICD-10-PCS | Mod: ,,, | Performed by: FAMILY MEDICINE

## 2020-01-24 PROCEDURE — 99999 PR PBB SHADOW E&M-EST. PATIENT-LVL IV: CPT | Mod: PBBFAC,,, | Performed by: FAMILY MEDICINE

## 2020-01-24 PROCEDURE — 99999 PR PBB SHADOW E&M-EST. PATIENT-LVL IV: ICD-10-PCS | Mod: PBBFAC,,, | Performed by: FAMILY MEDICINE

## 2020-01-24 PROCEDURE — 1159F MED LIST DOCD IN RCRD: CPT | Mod: ,,, | Performed by: FAMILY MEDICINE

## 2020-01-24 PROCEDURE — 99214 OFFICE O/P EST MOD 30 MIN: CPT | Mod: PBBFAC,PO | Performed by: FAMILY MEDICINE

## 2020-01-24 PROCEDURE — 76856 US EXAM PELVIC COMPLETE: CPT | Mod: 26,,, | Performed by: RADIOLOGY

## 2020-01-24 PROCEDURE — 76856 US EXAM PELVIC COMPLETE: CPT | Mod: TC,PO

## 2020-01-24 PROCEDURE — 99213 OFFICE O/P EST LOW 20 MIN: CPT | Mod: S$PBB,,, | Performed by: FAMILY MEDICINE

## 2020-01-24 NOTE — PROGRESS NOTES
Subjective:       Patient ID: Naomi Toledo is a 69 y.o. female.    Chief Complaint: Hematuria    HPI  Review of Systems    Patient Active Problem List   Diagnosis    Hyperglycemia    MS (multiple sclerosis)    Positive NURIA (antinuclear antibody)    Encounter for long-term (current) use of high-risk medication    Urinary urgency    Morbid obesity with BMI of 40.0-44.9, adult    Acne rosacea     Patient is here for a c/o 1 episode of pink on toilet paper after wiping. Is unsure if urine or vaginal blood but suspects latter.      Objective:      Physical Exam   Constitutional: She is oriented to person, place, and time. She appears well-developed and well-nourished.   Neck: No thyromegaly present.   Cardiovascular: Normal rate, regular rhythm and normal heart sounds.   Pulmonary/Chest: Effort normal and breath sounds normal.   Abdominal: Soft. Bowel sounds are normal. She exhibits no distension. There is no tenderness.   Genitourinary: Vagina normal and uterus normal. No breast swelling, tenderness, discharge or bleeding. Pelvic exam was performed with patient supine. No labial fusion. There is no rash, tenderness, lesion or injury on the right labia. There is no rash, tenderness, lesion or injury on the left labia. Cervix exhibits no motion tenderness, no discharge and no friability. Right adnexum displays no mass, no tenderness and no fullness. Left adnexum displays no mass, no tenderness and no fullness. No vaginal discharge found.   Neurological: She is alert and oriented to person, place, and time.   Skin: Skin is warm and dry.   Psychiatric: She has a normal mood and affect.   Nursing note and vitals reviewed.      Assessment:       1. Urinary tract infection without hematuria, site unspecified    2. Vaginal discharge    3. Post-menopause bleeding    4. Pyuria         Plan:         1. Urinary tract infection without hematuria, site unspecified  Screen and treat as indicated:    - POCT urinalysis,  dipstick or tablet reag  - Urine culture    2. Vaginal discharge  Screen and treat as indicated:    - Vaginosis Screen by DNA Probe  - C. trachomatis/N. gonorrhoeae by AMP DNA [facility]; Cervix    3. Post-menopause bleeding  Screen and treat as indicated:    - US Pelvis Complete Non OB; Future    4. Pyuria   Screen and treat as indicated:    - C. trachomatis/N. gonorrhoeae by AMP DNA [facility]; Cervix        Time spent with patient: 20 minutes    Patient with be reevaluated in prn or sooner prn    Greater than 50% of this visit was spent counseling as described in above documentation:Yes

## 2020-01-25 LAB
BACTERIAL VAGINOSIS DNA: NEGATIVE
C TRACH DNA SPEC QL NAA+PROBE: NOT DETECTED
CANDIDA GLABRATA DNA: NEGATIVE
CANDIDA KRUSEI DNA: NEGATIVE
CANDIDA RRNA VAG QL PROBE: NEGATIVE
N GONORRHOEA DNA SPEC QL NAA+PROBE: NOT DETECTED
T VAGINALIS RRNA GENITAL QL PROBE: NEGATIVE

## 2020-01-26 DIAGNOSIS — R93.89 ENDOMETRIAL THICKENING ON ULTRASOUND: Primary | ICD-10-CM

## 2020-01-26 DIAGNOSIS — N95.0 POST-MENOPAUSE BLEEDING: ICD-10-CM

## 2020-01-26 LAB — BACTERIA UR CULT: NORMAL

## 2020-01-27 ENCOUNTER — PATIENT MESSAGE (OUTPATIENT)
Dept: FAMILY MEDICINE | Facility: CLINIC | Age: 70
End: 2020-01-27

## 2020-01-27 DIAGNOSIS — R93.89 ENDOMETRIAL THICKENING ON ULTRASOUND: ICD-10-CM

## 2020-01-27 DIAGNOSIS — E11.9 TYPE 2 DIABETES MELLITUS WITHOUT COMPLICATION, WITHOUT LONG-TERM CURRENT USE OF INSULIN: Primary | ICD-10-CM

## 2020-01-27 DIAGNOSIS — N95.0 POST-MENOPAUSE BLEEDING: ICD-10-CM

## 2020-01-27 RX ORDER — METFORMIN HYDROCHLORIDE 500 MG/1
1000 TABLET, EXTENDED RELEASE ORAL
Qty: 180 TABLET | Refills: 3 | Status: SHIPPED | OUTPATIENT
Start: 2020-01-27 | End: 2020-05-28

## 2020-01-27 NOTE — TELEPHONE ENCOUNTER
Patient has been informed of all results and verbalizes full understanding. She has been scheduled to see diabetes education on 215/20.     Patient requests to see Calli Hernandez [place].  Please place appropriate referral.

## 2020-01-27 NOTE — TELEPHONE ENCOUNTER
There is thickening of the endometrium on the ultrasound.  This is abnormal in a post menopausal female and further testing is needed. I have placed a referral to OB/GYN Dr. Turpin in [place].  Please schedule appt. A biopsy is needed to rule cancer or precancer.  This can be done as an office procedure    Also blood sugar  Indicated worsening blood sugar control and diabetes type 2.  Add metformin  mg 2 po qith breakfast and refer to diab education for counseling on diet, weight loss and diabetes mgmt.

## 2020-01-28 ENCOUNTER — TELEPHONE (OUTPATIENT)
Dept: FAMILY MEDICINE | Facility: CLINIC | Age: 70
End: 2020-01-28

## 2020-01-28 NOTE — PROGRESS NOTES
Called patient regarding her results, she stated she already spoke with Maryjane on yesterday. Patient has an appointment with Dr. Hernandez on tomorrow.

## 2020-02-12 ENCOUNTER — PATIENT OUTREACH (OUTPATIENT)
Dept: ADMINISTRATIVE | Facility: OTHER | Age: 70
End: 2020-02-12

## 2020-02-13 NOTE — PROGRESS NOTES
Chart reviewed.   Requested updates from Care Everywhere.  Immunizations reconciled.   HM updated.  Referral to gastro in chart

## 2020-02-17 ENCOUNTER — CLINICAL SUPPORT (OUTPATIENT)
Dept: DIABETES | Facility: CLINIC | Age: 70
End: 2020-02-17
Payer: MEDICARE

## 2020-02-17 VITALS — WEIGHT: 270.06 LBS | HEIGHT: 66 IN | BODY MASS INDEX: 43.4 KG/M2

## 2020-02-17 DIAGNOSIS — E11.9 TYPE 2 DIABETES MELLITUS WITHOUT COMPLICATION, WITHOUT LONG-TERM CURRENT USE OF INSULIN: ICD-10-CM

## 2020-02-17 PROCEDURE — 99213 OFFICE O/P EST LOW 20 MIN: CPT | Mod: PBBFAC,PO | Performed by: NUTRITIONIST

## 2020-02-17 PROCEDURE — G0108 DIAB MANAGE TRN  PER INDIV: HCPCS | Mod: PBBFAC,PO | Performed by: NUTRITIONIST

## 2020-02-17 PROCEDURE — 99999 PR PBB SHADOW E&M-EST. PATIENT-LVL III: CPT | Mod: PBBFAC,,, | Performed by: NUTRITIONIST

## 2020-02-17 PROCEDURE — 99999 PR PBB SHADOW E&M-EST. PATIENT-LVL III: ICD-10-PCS | Mod: PBBFAC,,, | Performed by: NUTRITIONIST

## 2020-02-24 ENCOUNTER — PATIENT MESSAGE (OUTPATIENT)
Dept: NEUROLOGY | Facility: CLINIC | Age: 70
End: 2020-02-24

## 2020-03-04 ENCOUNTER — PATIENT OUTREACH (OUTPATIENT)
Dept: ADMINISTRATIVE | Facility: HOSPITAL | Age: 70
End: 2020-03-04

## 2020-03-04 NOTE — PROGRESS NOTES
Chart review completed 03/04/2020.    Immunizations reconciled.       WOG orders placed. NO  Letter mailed.  NO  HM updated with external COLONOSCOPY report.   Requested NO  records

## 2020-03-09 ENCOUNTER — PATIENT MESSAGE (OUTPATIENT)
Dept: FAMILY MEDICINE | Facility: CLINIC | Age: 70
End: 2020-03-09

## 2020-03-10 ENCOUNTER — PATIENT MESSAGE (OUTPATIENT)
Dept: FAMILY MEDICINE | Facility: CLINIC | Age: 70
End: 2020-03-10

## 2020-03-10 ENCOUNTER — HOSPITAL ENCOUNTER (OUTPATIENT)
Facility: HOSPITAL | Age: 70
LOS: 1 days | Discharge: HOME | End: 2020-03-11
Attending: EMERGENCY MEDICINE | Admitting: HOSPITALIST
Payer: MEDICARE

## 2020-03-10 DIAGNOSIS — R06.02 SOB (SHORTNESS OF BREATH): ICD-10-CM

## 2020-03-10 DIAGNOSIS — I26.99 BILATERAL PULMONARY EMBOLISM: Primary | ICD-10-CM

## 2020-03-10 DIAGNOSIS — R07.9 CHEST PAIN: ICD-10-CM

## 2020-03-10 PROBLEM — E11.9 TYPE 2 DIABETES MELLITUS WITHOUT COMPLICATION, WITHOUT LONG-TERM CURRENT USE OF INSULIN: Status: ACTIVE | Noted: 2020-03-10

## 2020-03-10 LAB
ALBUMIN SERPL BCP-MCNC: 3.9 G/DL (ref 3.5–5.2)
ALP SERPL-CCNC: 70 U/L (ref 55–135)
ALT SERPL W/O P-5'-P-CCNC: 15 U/L (ref 10–44)
ANION GAP SERPL CALC-SCNC: 12 MMOL/L (ref 8–16)
APTT BLDCRRT: 28.7 SEC (ref 21–32)
APTT BLDCRRT: 52.5 SEC (ref 21–32)
APTT BLDCRRT: 72.7 SEC (ref 21–32)
AST SERPL-CCNC: 16 U/L (ref 10–40)
BASOPHILS # BLD AUTO: 0.03 K/UL (ref 0–0.2)
BASOPHILS NFR BLD: 0.4 % (ref 0–1.9)
BILIRUB SERPL-MCNC: 0.9 MG/DL (ref 0.1–1)
BUN SERPL-MCNC: 19 MG/DL (ref 8–23)
CALCIUM SERPL-MCNC: 9.4 MG/DL (ref 8.7–10.5)
CHLORIDE SERPL-SCNC: 106 MMOL/L (ref 95–110)
CO2 SERPL-SCNC: 20 MMOL/L (ref 23–29)
CREAT SERPL-MCNC: 1.2 MG/DL (ref 0.5–1.4)
DIFFERENTIAL METHOD: NORMAL
EOSINOPHIL # BLD AUTO: 0.1 K/UL (ref 0–0.5)
EOSINOPHIL NFR BLD: 1.7 % (ref 0–8)
ERYTHROCYTE [DISTWIDTH] IN BLOOD BY AUTOMATED COUNT: 11.7 % (ref 11.5–14.5)
EST. GFR  (AFRICAN AMERICAN): 54 ML/MIN/1.73 M^2
EST. GFR  (NON AFRICAN AMERICAN): 47 ML/MIN/1.73 M^2
GLUCOSE SERPL-MCNC: 104 MG/DL (ref 70–110)
HCT VFR BLD AUTO: 42.1 % (ref 37–48.5)
HGB BLD-MCNC: 13.9 G/DL (ref 12–16)
IMM GRANULOCYTES # BLD AUTO: 0.03 K/UL (ref 0–0.04)
INR PPP: 1 (ref 0.8–1.2)
LYMPHOCYTES # BLD AUTO: 1.3 K/UL (ref 1–4.8)
LYMPHOCYTES NFR BLD: 18.7 % (ref 18–48)
MCH RBC QN AUTO: 29.7 PG (ref 27–31)
MCHC RBC AUTO-ENTMCNC: 33 G/DL (ref 32–36)
MCV RBC AUTO: 90 FL (ref 82–98)
MONOCYTES # BLD AUTO: 0.8 K/UL (ref 0.3–1)
MONOCYTES NFR BLD: 11.2 % (ref 4–15)
NEUTROPHILS # BLD AUTO: 4.7 K/UL (ref 1.8–7.7)
NEUTROPHILS NFR BLD: 67.6 % (ref 38–73)
NRBC BLD-RTO: 0 /100 WBC
PLATELET # BLD AUTO: 165 K/UL (ref 150–350)
PMV BLD AUTO: 10.4 FL (ref 9.2–12.9)
POCT GLUCOSE: 107 MG/DL (ref 70–110)
POCT GLUCOSE: 110 MG/DL (ref 70–110)
POCT GLUCOSE: 99 MG/DL (ref 70–110)
POTASSIUM SERPL-SCNC: 3.8 MMOL/L (ref 3.5–5.1)
PROT SERPL-MCNC: 6.9 G/DL (ref 6–8.4)
PROTHROMBIN TIME: 10.2 SEC (ref 9–12.5)
RBC # BLD AUTO: 4.68 M/UL (ref 4–5.4)
SODIUM SERPL-SCNC: 138 MMOL/L (ref 136–145)
TROPONIN I SERPL DL<=0.01 NG/ML-MCNC: 0.01 NG/ML (ref 0–0.03)
WBC # BLD AUTO: 6.97 K/UL (ref 3.9–12.7)

## 2020-03-10 PROCEDURE — 99291 CRITICAL CARE FIRST HOUR: CPT | Mod: 25

## 2020-03-10 PROCEDURE — 80053 COMPREHEN METABOLIC PANEL: CPT

## 2020-03-10 PROCEDURE — 93005 ELECTROCARDIOGRAM TRACING: CPT

## 2020-03-10 PROCEDURE — 85025 COMPLETE CBC W/AUTO DIFF WBC: CPT

## 2020-03-10 PROCEDURE — 63600175 PHARM REV CODE 636 W HCPCS: Performed by: EMERGENCY MEDICINE

## 2020-03-10 PROCEDURE — 93010 EKG 12-LEAD: ICD-10-PCS | Mod: ,,, | Performed by: INTERNAL MEDICINE

## 2020-03-10 PROCEDURE — 85730 THROMBOPLASTIN TIME PARTIAL: CPT | Mod: 91

## 2020-03-10 PROCEDURE — 63600175 PHARM REV CODE 636 W HCPCS: Performed by: HOSPITALIST

## 2020-03-10 PROCEDURE — 96365 THER/PROPH/DIAG IV INF INIT: CPT

## 2020-03-10 PROCEDURE — G0378 HOSPITAL OBSERVATION PER HR: HCPCS

## 2020-03-10 PROCEDURE — 85610 PROTHROMBIN TIME: CPT

## 2020-03-10 PROCEDURE — 36415 COLL VENOUS BLD VENIPUNCTURE: CPT

## 2020-03-10 PROCEDURE — 63600175 PHARM REV CODE 636 W HCPCS: Performed by: NURSE PRACTITIONER

## 2020-03-10 PROCEDURE — 96366 THER/PROPH/DIAG IV INF ADDON: CPT

## 2020-03-10 PROCEDURE — 93010 ELECTROCARDIOGRAM REPORT: CPT | Mod: ,,, | Performed by: INTERNAL MEDICINE

## 2020-03-10 PROCEDURE — 84484 ASSAY OF TROPONIN QUANT: CPT

## 2020-03-10 PROCEDURE — 94760 N-INVAS EAR/PLS OXIMETRY 1: CPT

## 2020-03-10 PROCEDURE — 25500020 PHARM REV CODE 255: Performed by: EMERGENCY MEDICINE

## 2020-03-10 RX ORDER — LANOLIN ALCOHOL/MO/W.PET/CERES
800 CREAM (GRAM) TOPICAL
Status: DISCONTINUED | OUTPATIENT
Start: 2020-03-10 | End: 2020-03-11

## 2020-03-10 RX ORDER — IBUPROFEN 200 MG
16 TABLET ORAL
Status: DISCONTINUED | OUTPATIENT
Start: 2020-03-10 | End: 2020-03-11 | Stop reason: HOSPADM

## 2020-03-10 RX ORDER — POTASSIUM CHLORIDE 20 MEQ/15ML
40 SOLUTION ORAL
Status: DISCONTINUED | OUTPATIENT
Start: 2020-03-10 | End: 2020-03-11 | Stop reason: HOSPADM

## 2020-03-10 RX ORDER — SODIUM CHLORIDE 9 MG/ML
INJECTION, SOLUTION INTRAVENOUS CONTINUOUS
Status: DISCONTINUED | OUTPATIENT
Start: 2020-03-10 | End: 2020-03-10

## 2020-03-10 RX ORDER — POTASSIUM CHLORIDE 20 MEQ/15ML
40 SOLUTION ORAL
Status: DISCONTINUED | OUTPATIENT
Start: 2020-03-10 | End: 2020-03-10

## 2020-03-10 RX ORDER — MORPHINE SULFATE 10 MG/ML
2 INJECTION INTRAMUSCULAR; INTRAVENOUS; SUBCUTANEOUS EVERY 4 HOURS PRN
Status: DISCONTINUED | OUTPATIENT
Start: 2020-03-10 | End: 2020-03-10

## 2020-03-10 RX ORDER — MINOCYCLINE HYDROCHLORIDE 50 MG/1
CAPSULE ORAL
COMMUNITY
Start: 2020-02-11 | End: 2020-08-26

## 2020-03-10 RX ORDER — IBUPROFEN 200 MG
24 TABLET ORAL
Status: DISCONTINUED | OUTPATIENT
Start: 2020-03-10 | End: 2020-03-11 | Stop reason: HOSPADM

## 2020-03-10 RX ORDER — ACETAMINOPHEN 325 MG/1
650 TABLET ORAL EVERY 4 HOURS PRN
Status: DISCONTINUED | OUTPATIENT
Start: 2020-03-10 | End: 2020-03-11 | Stop reason: HOSPADM

## 2020-03-10 RX ORDER — POTASSIUM CHLORIDE 20 MEQ/15ML
60 SOLUTION ORAL
Status: DISCONTINUED | OUTPATIENT
Start: 2020-03-10 | End: 2020-03-11 | Stop reason: HOSPADM

## 2020-03-10 RX ORDER — HYDROCODONE BITARTRATE AND ACETAMINOPHEN 5; 325 MG/1; MG/1
1 TABLET ORAL EVERY 6 HOURS PRN
Status: DISCONTINUED | OUTPATIENT
Start: 2020-03-10 | End: 2020-03-11 | Stop reason: HOSPADM

## 2020-03-10 RX ORDER — HEPARIN SODIUM,PORCINE/D5W 25000/250
18 INTRAVENOUS SOLUTION INTRAVENOUS CONTINUOUS
Status: DISCONTINUED | OUTPATIENT
Start: 2020-03-10 | End: 2020-03-11

## 2020-03-10 RX ORDER — INSULIN ASPART 100 [IU]/ML
0-5 INJECTION, SOLUTION INTRAVENOUS; SUBCUTANEOUS
Status: DISCONTINUED | OUTPATIENT
Start: 2020-03-10 | End: 2020-03-11 | Stop reason: HOSPADM

## 2020-03-10 RX ORDER — OXYMETAZOLINE HYDROCHLORIDE 1 G/100G
CREAM TOPICAL
COMMUNITY
Start: 2020-02-09

## 2020-03-10 RX ORDER — MINOCYCLINE HYDROCHLORIDE 50 MG/1
50 TABLET ORAL EVERY 12 HOURS
Status: DISCONTINUED | OUTPATIENT
Start: 2020-03-10 | End: 2020-03-11 | Stop reason: HOSPADM

## 2020-03-10 RX ORDER — METHYLPREDNISOLONE SOD SUCC 125 MG
125 VIAL (EA) INJECTION ONCE
Status: COMPLETED | OUTPATIENT
Start: 2020-03-10 | End: 2020-03-10

## 2020-03-10 RX ORDER — DIPHENHYDRAMINE HYDROCHLORIDE 50 MG/ML
25 INJECTION INTRAMUSCULAR; INTRAVENOUS ONCE
Status: COMPLETED | OUTPATIENT
Start: 2020-03-10 | End: 2020-03-10

## 2020-03-10 RX ORDER — ONDANSETRON 2 MG/ML
4 INJECTION INTRAMUSCULAR; INTRAVENOUS EVERY 6 HOURS PRN
Status: DISCONTINUED | OUTPATIENT
Start: 2020-03-10 | End: 2020-03-11 | Stop reason: HOSPADM

## 2020-03-10 RX ORDER — GLUCAGON 1 MG
1 KIT INJECTION
Status: DISCONTINUED | OUTPATIENT
Start: 2020-03-10 | End: 2020-03-11 | Stop reason: HOSPADM

## 2020-03-10 RX ORDER — MEGESTROL ACETATE 40 MG/1
TABLET ORAL
COMMUNITY
Start: 2020-02-17 | End: 2020-04-07

## 2020-03-10 RX ORDER — IBUPROFEN 400 MG/1
400 TABLET ORAL EVERY 6 HOURS PRN
Status: DISCONTINUED | OUTPATIENT
Start: 2020-03-10 | End: 2020-03-11 | Stop reason: HOSPADM

## 2020-03-10 RX ORDER — SODIUM CHLORIDE 0.9 % (FLUSH) 0.9 %
10 SYRINGE (ML) INJECTION
Status: DISCONTINUED | OUTPATIENT
Start: 2020-03-10 | End: 2020-03-11 | Stop reason: HOSPADM

## 2020-03-10 RX ORDER — LANOLIN ALCOHOL/MO/W.PET/CERES
800 CREAM (GRAM) TOPICAL
Status: DISCONTINUED | OUTPATIENT
Start: 2020-03-10 | End: 2020-03-11 | Stop reason: HOSPADM

## 2020-03-10 RX ADMIN — HEPARIN SODIUM 18 UNITS/KG/HR: 10000 INJECTION, SOLUTION INTRAVENOUS at 07:03

## 2020-03-10 RX ADMIN — METHYLPREDNISOLONE SODIUM SUCCINATE 125 MG: 125 INJECTION, POWDER, FOR SOLUTION INTRAMUSCULAR; INTRAVENOUS at 02:03

## 2020-03-10 RX ADMIN — DIPHENHYDRAMINE HYDROCHLORIDE 25 MG: 50 INJECTION INTRAMUSCULAR; INTRAVENOUS at 02:03

## 2020-03-10 RX ADMIN — HEPARIN SODIUM 18 UNITS/KG/HR: 10000 INJECTION, SOLUTION INTRAVENOUS at 09:03

## 2020-03-10 RX ADMIN — IOHEXOL 75 ML: 350 INJECTION, SOLUTION INTRAVENOUS at 06:03

## 2020-03-10 RX ADMIN — MORPHINE SULFATE 2 MG: 10 INJECTION, SOLUTION INTRAMUSCULAR; INTRAVENOUS at 02:03

## 2020-03-10 RX ADMIN — SODIUM CHLORIDE: 0.9 INJECTION, SOLUTION INTRAVENOUS at 09:03

## 2020-03-10 NOTE — ED PROVIDER NOTES
"Encounter Date: 3/10/2020       History     Chief Complaint   Patient presents with    Shortness of Breath     Started walking for exercise about 10 days ago and started having shortness of breath which is getting worse. Also reports having "lung" pain under left axilla area that radiates up to neck     Chief complaint:  Shortness of breath and chest pain    HPI:  70-year-old female presents with a one-week history of progressively worsening shortness of breath. She also has pleuritic left posterolateral chest discomfort.  She denies any fever and has no cough.  She reports superficial phlebitis of her right thigh but has no history of DVT.  She reports that the clot broke off.  He denies any fever or cough and has no significant lung disease.  She has no cardiac history.        Review of patient's allergies indicates:  No Known Allergies  Past Medical History:   Diagnosis Date    Blood transfusion     Deep vein thrombosis     MS (multiple sclerosis)     Plantar fasciitis of left foot 2/2014     Past Surgical History:   Procedure Laterality Date    OVARIAN CYST SURGERY  1974    TUBAL LIGATION      VEIN SURGERY       Family History   Problem Relation Age of Onset    Cancer Mother         breast    Breast cancer Mother 50    Hypertension Father     Cancer Sister     Hypertension Sister     Breast cancer Sister 64    Cancer Brother     Diabetes Maternal Grandmother     Leukemia Paternal Grandmother      Social History     Tobacco Use    Smoking status: Never Smoker    Smokeless tobacco: Never Used   Substance Use Topics    Alcohol use: No     Frequency: Never    Drug use: No     Review of Systems   Constitutional: Negative for activity change, appetite change, chills, fatigue and fever.   Eyes: Negative for visual disturbance.   Respiratory: Positive for shortness of breath. Negative for apnea.    Cardiovascular: Positive for chest pain. Negative for palpitations and leg swelling. "   Gastrointestinal: Negative for abdominal distention and abdominal pain.   Genitourinary: Negative for difficulty urinating.   Musculoskeletal: Negative for neck pain.   Skin: Negative for pallor and rash.   Neurological: Negative for headaches.   Hematological: Does not bruise/bleed easily.   Psychiatric/Behavioral: Negative for agitation.       Physical Exam     Initial Vitals [03/10/20 0501]   BP Pulse Resp Temp SpO2   138/84 (!) 117 (!) 22 98.1 °F (36.7 °C) 96 %      MAP       --         Physical Exam    Nursing note and vitals reviewed.  Constitutional: She appears well-developed and well-nourished.   HENT:   Head: Normocephalic and atraumatic.   Eyes: Conjunctivae are normal.   Neck: Normal range of motion. Neck supple.   Cardiovascular: Regular rhythm and normal heart sounds. Exam reveals no gallop and no friction rub.    No murmur heard.  Tachycardia great   Pulmonary/Chest: Effort normal and breath sounds normal. No respiratory distress. She has no wheezes. She has no rhonchi. She has no rales.   Tachypnea   Abdominal: Soft. She exhibits no distension. There is no tenderness.   Musculoskeletal: Normal range of motion. She exhibits no edema or tenderness.   Neurological: She is alert and oriented to person, place, and time. GCS score is 15. GCS eye subscore is 4. GCS verbal subscore is 5. GCS motor subscore is 6.   Skin: Skin is warm and dry. No erythema.   Psychiatric: She has a normal mood and affect.         ED Course   Critical Care  Date/Time: 3/10/2020 7:55 PM  Performed by: Christiano Steven III, MD  Authorized by: Tammy Bhatti MD   Direct patient critical care time: 60 minutes  Total critical care time (exclusive of procedural time) : 60 minutes  Critical care was necessary to treat or prevent imminent or life-threatening deterioration of the following conditions: Bilateral pulmonary emboli.  Critical care was time spent personally by me on the following activities: pulse oximetry, ordering and  review of laboratory studies, obtaining history from patient or surrogate, development of treatment plan with patient or surrogate, examination of patient, ordering and performing treatments and interventions and ordering and review of radiographic studies.  Subsequent provider of critical care: I assumed direction of critical care for this patient from another provider of my specialty.        Labs Reviewed - No data to display  EKG Readings: (Independently Interpreted)   Rhythm: Sinus Tachycardia. Heart Rate: 106. Ectopy: No Ectopy. Conduction: Normal. ST Segments: Normal ST Segments. T Waves: Normal. Axis: Normal. Clinical Impression: Sinus Tachycardia       Imaging Results    None                                          Clinical Impression:       ICD-10-CM ICD-9-CM   1. Bilateral pulmonary embolism I26.99 415.19   2. SOB (shortness of breath) R06.02 786.05   3. Chest pain R07.9 786.50                                Christiano Steven III, MD  03/10/20 1956

## 2020-03-10 NOTE — H&P
"[facility] Medicine  History & Physical    Patient Name: Naomi Toledo  MRN: [id number]  Admission Date: 3/10/2020  Attending Physician: Christiano Steven III, MD   Primary Care Provider: Tammy Benedict MD         Patient information was obtained from patient, spouse/SO and ER records.     Subjective:     Principal Problem:Bilateral pulmonary embolism    Chief Complaint:   Chief Complaint   Patient presents with    Shortness of Breath     Started walking for exercise about 10 days ago and started having shortness of breath which is getting worse. Also reports having "lung" pain under left axilla area that radiates up to neck        HPI: Naomi Toledo is a 70-year-old female with a past medical history of non-insulin-dependent diabetes, multiple sclerosis, and vitamin-D deficiency who presents to the emergency room tonight with reports of shortness of breath.  Patient states onset of shortness of breath was approximately 1 week ago, positive dyspnea on exertion.  Patient endorses left sided chest pain that has increased in severity, sharp in characteristic, located to the left lateral chest and mid back area.  Patient states pain is worse with movement, not relieved by over-the-counter aspirin.  Patient also endorses that she recently introduced 15 min of walking to her daily regimen as advised per her diabetic educator.  Patient states that she was recently started on metformin 1 month ago and Megastrol 2 weeks ago.  Patient denies having this pain before, or history of pulmonary embolism.  Patient states she did have superficial phlebitis of the right lower extremity that progressed and required emergency surgery approximately 10 years ago.  In the emergency room a CT of the chest was performed revealing bilateral pulmonary emboli.  Patient admitted to hospital on 03/10/2020 at approximately 6:30 a.m..  Patient accompanied by her  during my initial interview and physical " exam.  Patient denies use of supplemental oxygen or ambulatory assist devices.    Past Medical History:   Diagnosis Date    Blood transfusion     Deep vein thrombosis     MS (multiple sclerosis)     Plantar fasciitis of left foot 2/2014       Past Surgical History:   Procedure Laterality Date    OVARIAN CYST SURGERY  1974    TUBAL LIGATION      VEIN SURGERY         Review of patient's allergies indicates:  No Known Allergies    No current facility-administered medications on file prior to encounter.      Current Outpatient Medications on File Prior to Encounter   Medication Sig    AVONEX 30 mcg/0.5 mL PnKt INJECT 0.5 ML (30 MCG) INTO THE MUSCLE EVERY 7 DAYS    cholecalciferol, vitamin D3, (VITAMIN D3) 5,000 unit Tab Take 5,000 Units by mouth once daily.    metFORMIN (GLUCOPHAGE-XR) 500 MG 24 hr tablet Take 2 tablets (1,000 mg total) by mouth daily with breakfast.    metroNIDAZOLE (METROGEL) 0.75 % gel Apply topically 2 (two) times daily.     Family History     Problem Relation (Age of Onset)    Breast cancer Mother (50), Sister (64)    Cancer Mother, Sister, Brother    Diabetes Maternal Grandmother    Hypertension Father, Sister    Leukemia Paternal Grandmother        Tobacco Use    Smoking status: Never Smoker    Smokeless tobacco: Never Used   Substance and Sexual Activity    Alcohol use: No     Frequency: Never    Drug use: No    Sexual activity: Yes     Partners: Male     Review of Systems   Constitutional: Positive for activity change. Negative for appetite change, chills, fatigue and fever.   HENT: Negative for congestion, sinus pressure, sinus pain and sore throat.    Eyes: Negative for photophobia and visual disturbance.   Respiratory: Positive for shortness of breath. Negative for cough, choking, chest tightness and wheezing.    Cardiovascular: Positive for chest pain. Negative for palpitations and leg swelling.   Gastrointestinal: Negative for abdominal distention, abdominal pain, blood in  stool, constipation, diarrhea, nausea and vomiting.   Genitourinary: Negative for difficulty urinating, dysuria, flank pain and hematuria.   Musculoskeletal: Negative for back pain, gait problem and joint swelling.   Skin: Negative for rash and wound.   Neurological: Negative for dizziness, syncope, weakness, light-headedness, numbness and headaches.   Psychiatric/Behavioral: Negative for confusion. The patient is not nervous/anxious.      Objective:     Vital Signs (Most Recent):  Temp: 98.1 °F (36.7 °C) (03/10/20 0501)  Pulse: 94 (03/10/20 0622)  Resp: (!) 22 (03/10/20 0501)  BP: (!) 149/77 (03/10/20 0622)  SpO2: 100 % (03/10/20 0622) Vital Signs (24h Range):  Temp:  [98.1 °F (36.7 °C)] 98.1 °F (36.7 °C)  Pulse:  [] 94  Resp:  [22] 22  SpO2:  [96 %-100 %] 100 %  BP: (138-149)/(77-84) 149/77     Weight: 116.3 kg (256 lb 6.3 oz)  Body mass index is 41.38 kg/m².    Physical Exam   Constitutional: She is oriented to person, place, and time. She appears well-developed and well-nourished. No distress.   HENT:   Head: Normocephalic and atraumatic.   Eyeglasses in use   Eyes: Pupils are equal, round, and reactive to light. EOM are normal. Right eye exhibits no discharge. Left eye exhibits no discharge.   Neck: Normal range of motion. No JVD present.   Cardiovascular: Normal rate, regular rhythm, normal heart sounds and intact distal pulses.   No murmur heard.  Pulmonary/Chest: Effort normal and breath sounds normal. No respiratory distress. She has no wheezes. She has no rales. She exhibits no tenderness.   Patient on room air during my initial interview and physical exam, patient in no acute respiratory distress.   Abdominal: Soft. Bowel sounds are normal. She exhibits no distension. There is no tenderness. There is no rebound and no guarding.   Genitourinary:   Genitourinary Comments: Exam Deferred      Musculoskeletal: Normal range of motion. She exhibits no edema, tenderness or deformity.   Neurological: She is  alert and oriented to person, place, and time. No cranial nerve deficit or sensory deficit.   Skin: Skin is warm and dry. Capillary refill takes 2 to 3 seconds. No rash noted. She is not diaphoretic. No erythema.   Psychiatric: She has a normal mood and affect. Her behavior is normal. Judgment and thought content normal.   Nursing note and vitals reviewed.        CRANIAL NERVES     CN III, IV, VI   Pupils are equal, round, and reactive to light.  Extraocular motions are normal.        Significant Labs:   CBC:   Recent Labs   Lab 03/10/20  0524   WBC 6.97   HGB 13.9   HCT 42.1        CMP:   Recent Labs   Lab 03/10/20  0524      K 3.8      CO2 20*      BUN 19   CREATININE 1.2   CALCIUM 9.4   PROT 6.9   ALBUMIN 3.9   BILITOT 0.9   ALKPHOS 70   AST 16   ALT 15   ANIONGAP 12   EGFRNONAA 47*     Troponin:   Recent Labs   Lab 03/10/20  0524   TROPONINI 0.006     Lab Results   Component Value Date    INR 1.0 03/10/2020         Significant Imaging: I have reviewed all pertinent imaging results/findings within the past 24 hours.     CTA Chest:  1. Positive for multifocal bilateral PE, overall moderate burden. No saddle embolus. No RV strain. 2. Trace left pleural effusion with slightly prominent markings in the left lower lobe, likely atelectasis and/or developing pulmonary infarct. 3. Nonobstructive right nephrolithiasis.     EKG performed on 03/10/2020, impression as below:  Sinus tachycardia  Otherwise normal ECG  When compared with ECG of 22-MAY-2014 09:52,  Previous ECG has undetermined rhythm, needs review    Assessment/Plan:     * Bilateral pulmonary embolism  Patient was initiated on IV heparin infusion in emergency room, will continue upon admission.  Continuous telemetry monitoring.  Utilize supplemental oxygen to maintain SpO2 greater than 90%.      Type 2 diabetes mellitus without complication, without long-term current use of insulin  Patient on metformin, will hold oral anti  hyperglycemic upon admission hospital.  Low-dose sliding scale insulin.  Accu-Cheks a.c. HS.      Morbid obesity with BMI of 40.0-44.9, adult  Body mass index is 41.38 kg/m². Morbid obesity complicates all aspects of disease management from diagnostic modalities to treatment. Weight loss encouraged and health benefits explained to patient. Nutritional counseling and physical activity encouraged.         MS (multiple sclerosis)  Chronic, controlled.  Will continue home medication.        VTE Risk Mitigation (From admission, onward)         Ordered     heparin 25,000 units in dextrose 5% (100 units/ml) IV bolus from bag INITIAL BOLUS  Once     Question:  Heparin Infusion Adjustment (DO NOT MODIFY ANSWER)  Answer:  [email or web address]\epic\Images\Pharmacy\HeparinInfusions\heparin HIGH INTENSITY nomogram for [facility] [id number]    03/10/20 0626     heparin 25,000 units in dextrose 5% 250 mL (100 units/mL) infusion HIGH INTENSITY nomogram - [facility]  Continuous     Question:  Heparin Infusion Adjustment (DO NOT MODIFY ANSWER)  Answer:  [email or web address]\epic\Images\Pharmacy\HeparinInfusions\heparin HIGH INTENSITY nomogram for [facility] [id number]    03/10/20 0626     heparin 25,000 units in dextrose 5% (100 units/ml) IV bolus from bag - ADDITIONAL PRN BOLUS - 60 units/kg  As needed (PRN)     Question:  Heparin Infusion Adjustment (DO NOT MODIFY ANSWER)  Answer:  [email or web address]\epic\Images\Pharmacy\HeparinInfusions\heparin HIGH INTENSITY nomogram for [facility] [id number].pdf    03/10/20 0626     heparin 25,000 units in dextrose 5% (100 units/ml) IV bolus from bag - ADDITIONAL PRN BOLUS - 30 units/kg  As needed (PRN)     Question:  Heparin Infusion Adjustment (DO NOT MODIFY ANSWER)  Answer:  [email or web address]\epic\Images\Pharmacy\HeparinInfusions\heparin HIGH INTENSITY nomogram for [facility] [id number].pdf    03/10/20 0626               Time spent seeing patient( greater than 1/2 spent in direct contact) : 50 minutes     Korina aCntrell NP  Department of Hospital  Medicine   [facility]

## 2020-03-10 NOTE — ASSESSMENT & PLAN NOTE
Body mass index is 41.38 kg/m². Morbid obesity complicates all aspects of disease management from diagnostic modalities to treatment. Weight loss encouraged and health benefits explained to patient. Nutritional counseling and physical activity encouraged.

## 2020-03-10 NOTE — SUBJECTIVE & OBJECTIVE
Interval History: left chest pain -front/back and side -constant //worse with breathing and movement   Better --    Review of Systems  Objective:     Vital Signs (Most Recent):  Temp: 97.6 °F (36.4 °C) (03/10/20 0809)  Pulse: 94 (03/10/20 0809)  Resp: (!) 22 (03/10/20 0809)  BP: (!) 143/72 (03/10/20 0809)  SpO2: 97 % (03/10/20 0809) Vital Signs (24h Range):  Temp:  [97.6 °F (36.4 °C)-98.1 °F (36.7 °C)] 97.6 °F (36.4 °C)  Pulse:  [] 94  Resp:  [22] 22  SpO2:  [94 %-100 %] 97 %  BP: (138-161)/(72-89) 143/72     Weight: 116.3 kg (256 lb 6.3 oz)  Body mass index is 41.38 kg/m².  No intake or output data in the 24 hours ending 03/10/20 1313   Physical Exam    Significant Labs: {Results:85301}    Significant Imaging: {Imaging Review:63064}

## 2020-03-10 NOTE — NURSING
Pt arrived to floor, continuous heparin infusing @ 14.8ml/hr. Pt toelrating well. VSS. Sob with exertion. Oriented to room.

## 2020-03-10 NOTE — SUBJECTIVE & OBJECTIVE
Past Medical History:   Diagnosis Date    Blood transfusion     Deep vein thrombosis     MS (multiple sclerosis)     Plantar fasciitis of left foot 2/2014       Past Surgical History:   Procedure Laterality Date    OVARIAN CYST SURGERY  1974    TUBAL LIGATION      VEIN SURGERY         Review of patient's allergies indicates:  No Known Allergies    No current facility-administered medications on file prior to encounter.      Current Outpatient Medications on File Prior to Encounter   Medication Sig    AVONEX 30 mcg/0.5 mL PnKt INJECT 0.5 ML (30 MCG) INTO THE MUSCLE EVERY 7 DAYS    cholecalciferol, vitamin D3, (VITAMIN D3) 5,000 unit Tab Take 5,000 Units by mouth once daily.    metFORMIN (GLUCOPHAGE-XR) 500 MG 24 hr tablet Take 2 tablets (1,000 mg total) by mouth daily with breakfast.    metroNIDAZOLE (METROGEL) 0.75 % gel Apply topically 2 (two) times daily.     Family History     Problem Relation (Age of Onset)    Breast cancer Mother (50), Sister (64)    Cancer Mother, Sister, Brother    Diabetes Maternal Grandmother    Hypertension Father, Sister    Leukemia Paternal Grandmother        Tobacco Use    Smoking status: Never Smoker    Smokeless tobacco: Never Used   Substance and Sexual Activity    Alcohol use: No     Frequency: Never    Drug use: No    Sexual activity: Yes     Partners: Male     Review of Systems   Constitutional: Positive for activity change. Negative for appetite change, chills, fatigue and fever.   HENT: Negative for congestion, sinus pressure, sinus pain and sore throat.    Eyes: Negative for photophobia and visual disturbance.   Respiratory: Positive for shortness of breath. Negative for cough, choking, chest tightness and wheezing.    Cardiovascular: Positive for chest pain. Negative for palpitations and leg swelling.   Gastrointestinal: Negative for abdominal distention, abdominal pain, blood in stool, constipation, diarrhea, nausea and vomiting.   Genitourinary: Negative for  difficulty urinating, dysuria, flank pain and hematuria.   Musculoskeletal: Negative for back pain, gait problem and joint swelling.   Skin: Negative for rash and wound.   Neurological: Negative for dizziness, syncope, weakness, light-headedness, numbness and headaches.   Psychiatric/Behavioral: Negative for confusion. The patient is not nervous/anxious.      Objective:     Vital Signs (Most Recent):  Temp: 98.1 °F (36.7 °C) (03/10/20 0501)  Pulse: 94 (03/10/20 0622)  Resp: (!) 22 (03/10/20 0501)  BP: (!) 149/77 (03/10/20 0622)  SpO2: 100 % (03/10/20 0622) Vital Signs (24h Range):  Temp:  [98.1 °F (36.7 °C)] 98.1 °F (36.7 °C)  Pulse:  [] 94  Resp:  [22] 22  SpO2:  [96 %-100 %] 100 %  BP: (138-149)/(77-84) 149/77     Weight: 116.3 kg (256 lb 6.3 oz)  Body mass index is 41.38 kg/m².    Physical Exam   Constitutional: She is oriented to person, place, and time. She appears well-developed and well-nourished. No distress.   HENT:   Head: Normocephalic and atraumatic.   Eyeglasses in use   Eyes: Pupils are equal, round, and reactive to light. EOM are normal. Right eye exhibits no discharge. Left eye exhibits no discharge.   Neck: Normal range of motion. No JVD present.   Cardiovascular: Normal rate, regular rhythm, normal heart sounds and intact distal pulses.   No murmur heard.  Pulmonary/Chest: Effort normal and breath sounds normal. No respiratory distress. She has no wheezes. She has no rales. She exhibits no tenderness.   Patient on room air during my initial interview and physical exam, patient in no acute respiratory distress.   Abdominal: Soft. Bowel sounds are normal. She exhibits no distension. There is no tenderness. There is no rebound and no guarding.   Genitourinary:   Genitourinary Comments: Exam Deferred      Musculoskeletal: Normal range of motion. She exhibits no edema, tenderness or deformity.   Neurological: She is alert and oriented to person, place, and time. No cranial nerve deficit or sensory  deficit.   Skin: Skin is warm and dry. Capillary refill takes 2 to 3 seconds. No rash noted. She is not diaphoretic. No erythema.   Psychiatric: She has a normal mood and affect. Her behavior is normal. Judgment and thought content normal.   Nursing note and vitals reviewed.        CRANIAL NERVES     CN III, IV, VI   Pupils are equal, round, and reactive to light.  Extraocular motions are normal.        Significant Labs:   CBC:   Recent Labs   Lab 03/10/20  0524   WBC 6.97   HGB 13.9   HCT 42.1        CMP:   Recent Labs   Lab 03/10/20  0524      K 3.8      CO2 20*      BUN 19   CREATININE 1.2   CALCIUM 9.4   PROT 6.9   ALBUMIN 3.9   BILITOT 0.9   ALKPHOS 70   AST 16   ALT 15   ANIONGAP 12   EGFRNONAA 47*     Troponin:   Recent Labs   Lab 03/10/20  0524   TROPONINI 0.006     Lab Results   Component Value Date    INR 1.0 03/10/2020         Significant Imaging: I have reviewed all pertinent imaging results/findings within the past 24 hours.     CTA Chest:  1. Positive for multifocal bilateral PE, overall moderate burden. No saddle embolus. No RV strain. 2. Trace left pleural effusion with slightly prominent markings in the left lower lobe, likely atelectasis and/or developing pulmonary infarct. 3. Nonobstructive right nephrolithiasis.     EKG performed on 03/10/2020, impression as below:  Sinus tachycardia  Otherwise normal ECG  When compared with ECG of 22-MAY-2014 09:52,  Previous ECG has undetermined rhythm, needs review

## 2020-03-10 NOTE — [facility] SECONDARY REVIEW
Physician Advisor External    Level of Care Issue    1003  Sent to EHR for review of 03/10/2020    1202  EHR determination is outpatient for 03/10/2020

## 2020-03-10 NOTE — ASSESSMENT & PLAN NOTE
Patient on metformin, will hold oral anti hyperglycemic upon admission hospital.  Low-dose sliding scale insulin.  Accu-Cheks a.c. HS.

## 2020-03-10 NOTE — HPI
Naomi Toledo is a 70-year-old female with a past medical history of non-insulin-dependent diabetes, multiple sclerosis, and vitamin-D deficiency who presents to the emergency room tonight with reports of shortness of breath.  Patient states onset of shortness of breath was approximately 1 week ago, positive dyspnea on exertion.  Patient endorses left sided chest pain that has increased in severity, sharp in characteristic, located to the left lateral chest and mid back area.  Patient states pain is worse with movement, not relieved by over-the-counter aspirin.  Patient also endorses that she recently introduced 15 min of walking to her daily regimen as advised per her diabetic educator.  Patient states that she was recently started on metformin 1 month ago and Megastrol 2 weeks ago.  Patient denies having this pain before, or history of pulmonary embolism.  Patient states she did have superficial phlebitis of the right lower extremity that progressed and required emergency surgery approximately 10 years ago.  In the emergency room a CT of the chest was performed revealing bilateral pulmonary emboli.  Patient admitted to hospital on 03/10/2020 at approximately 6:30 a.m..  Patient accompanied by her  during my initial interview and physical exam.  Patient denies use of supplemental oxygen or ambulatory assist devices.

## 2020-03-10 NOTE — PLAN OF CARE
Cm completed the assessment with pt and spouse at pt's bedside.  Pt lives with spouse and independent in care.  PCP is Dr. Benedict.  Insurance verified as Medicare/  for Life.  Pt verbalized she is a newly diagnosed diabetic approximately 3 weeks ago.  Pt does not have a glucometer.  Pt has a cane only.  Disposition: Pt will discharge to home. No needs verbalized at this time.  Cm will consult diabetes educator and nutritionist.  No other needs verbalized at this time.  Pharmacy of choice is [facility].       03/10/20 1433   Discharge Assessment   Assessment Type Discharge Planning Assessment   Confirmed/corrected address and phone number on facesheet? Yes   Assessment information obtained from? Patient   Communicated expected length of stay with patient/caregiver yes   Prior to hospitilization cognitive status: Alert/Oriented   Prior to hospitalization functional status: Independent   Current cognitive status: Alert/Oriented   Current Functional Status: Independent   Facility Arrived From: home   Lives With spouse   Able to Return to Prior Arrangements yes   Is patient able to care for self after discharge? Yes   Who are your caregiver(s) and their phone number(s)? spouse- Allan - [phone]   Patient's perception of discharge disposition home or selfcare   Readmission Within the Last 30 Days no previous admission in last 30 days   Patient currently being followed by outpatient case management? No   Patient currently receives any other outside agency services? No   Equipment Currently Used at Home none   Do you have any problems affording any of your prescribed medications? No   Is the patient taking medications as prescribed? yes   Does the patient have transportation home? Yes   Transportation Anticipated family or friend will provide   Dialysis Name and Scheduled days na   Does the patient receive services at the [facility]madin Clinic? No   Discharge Plan A Home with family   DME Needed Upon Discharge  none    Patient/Family in Agreement with Plan yes

## 2020-03-10 NOTE — UM SECONDARY REVIEW
Case was sent to EHR for review, and EHR recommendation is outpatient.  This  discussed outcome with Dr Bhatti, who was in agreement with changing patient to Obs.  Condition Code 44 completed with Dr Bhatti, and Dr Mason; gave to Cammy MARROQUIN who will meet with patient to complete the Moon.

## 2020-03-10 NOTE — PLAN OF CARE
Pt alert and oriented. No new symptoms. Heparin drip infusing per order. Prn pain medication given. Pt resting comfortably. Tele in place. Updated pt and family on plan of care at bedside. Wan/scds in place. Safety maintained. Will continue to moniotr

## 2020-03-10 NOTE — PROGRESS NOTES
C/O itching to IV site then up arm.  Pt states she had just received morphine about 10 min ago.  Denies SOB or throat pain.  Dr Bhatti notified.  Orders given

## 2020-03-10 NOTE — ASSESSMENT & PLAN NOTE
Patient was initiated on IV heparin infusion in emergency room, will continue upon admission.  Continuous telemetry monitoring.  Utilize supplemental oxygen to maintain SpO2 greater than 90%.

## 2020-03-11 ENCOUNTER — PATIENT MESSAGE (OUTPATIENT)
Dept: FAMILY MEDICINE | Facility: CLINIC | Age: 70
End: 2020-03-11

## 2020-03-11 ENCOUNTER — TELEPHONE (OUTPATIENT)
Dept: FAMILY MEDICINE | Facility: CLINIC | Age: 70
End: 2020-03-11

## 2020-03-11 VITALS
WEIGHT: 256.38 LBS | HEART RATE: 72 BPM | TEMPERATURE: 98 F | HEIGHT: 66 IN | RESPIRATION RATE: 18 BRPM | SYSTOLIC BLOOD PRESSURE: 131 MMHG | OXYGEN SATURATION: 97 % | BODY MASS INDEX: 41.2 KG/M2 | DIASTOLIC BLOOD PRESSURE: 72 MMHG

## 2020-03-11 DIAGNOSIS — I26.99 BILATERAL PULMONARY EMBOLISM: Primary | ICD-10-CM

## 2020-03-11 LAB
ALBUMIN SERPL BCP-MCNC: 3.7 G/DL (ref 3.5–5.2)
ALP SERPL-CCNC: 70 U/L (ref 55–135)
ALT SERPL W/O P-5'-P-CCNC: 15 U/L (ref 10–44)
ANION GAP SERPL CALC-SCNC: 12 MMOL/L (ref 8–16)
APTT BLDCRRT: 59.7 SEC (ref 21–32)
APTT BLDCRRT: 59.7 SEC (ref 21–32)
AST SERPL-CCNC: 15 U/L (ref 10–40)
BASOPHILS # BLD AUTO: 0.01 K/UL (ref 0–0.2)
BASOPHILS NFR BLD: 0.1 % (ref 0–1.9)
BILIRUB SERPL-MCNC: 0.7 MG/DL (ref 0.1–1)
BUN SERPL-MCNC: 15 MG/DL (ref 8–23)
CALCIUM SERPL-MCNC: 9.5 MG/DL (ref 8.7–10.5)
CHLORIDE SERPL-SCNC: 106 MMOL/L (ref 95–110)
CO2 SERPL-SCNC: 18 MMOL/L (ref 23–29)
CREAT SERPL-MCNC: 1 MG/DL (ref 0.5–1.4)
DIFFERENTIAL METHOD: ABNORMAL
EOSINOPHIL # BLD AUTO: 0 K/UL (ref 0–0.5)
EOSINOPHIL NFR BLD: 0 % (ref 0–8)
ERYTHROCYTE [DISTWIDTH] IN BLOOD BY AUTOMATED COUNT: 11.6 % (ref 11.5–14.5)
EST. GFR  (AFRICAN AMERICAN): >60 ML/MIN/1.73 M^2
EST. GFR  (NON AFRICAN AMERICAN): 58 ML/MIN/1.73 M^2
GLUCOSE SERPL-MCNC: 188 MG/DL (ref 70–110)
HCT VFR BLD AUTO: 43.5 % (ref 37–48.5)
HGB BLD-MCNC: 14.4 G/DL (ref 12–16)
IMM GRANULOCYTES # BLD AUTO: 0.03 K/UL (ref 0–0.04)
LYMPHOCYTES # BLD AUTO: 0.6 K/UL (ref 1–4.8)
LYMPHOCYTES NFR BLD: 7 % (ref 18–48)
MAGNESIUM SERPL-MCNC: 2.1 MG/DL (ref 1.6–2.6)
MCH RBC QN AUTO: 29.6 PG (ref 27–31)
MCHC RBC AUTO-ENTMCNC: 33.1 G/DL (ref 32–36)
MCV RBC AUTO: 89 FL (ref 82–98)
MONOCYTES # BLD AUTO: 0.3 K/UL (ref 0.3–1)
MONOCYTES NFR BLD: 3.8 % (ref 4–15)
NEUTROPHILS # BLD AUTO: 8 K/UL (ref 1.8–7.7)
NEUTROPHILS NFR BLD: 88.8 % (ref 38–73)
NRBC BLD-RTO: 0 /100 WBC
PLATELET # BLD AUTO: 188 K/UL (ref 150–350)
PMV BLD AUTO: 10.7 FL (ref 9.2–12.9)
POTASSIUM SERPL-SCNC: 4.1 MMOL/L (ref 3.5–5.1)
PROT SERPL-MCNC: 7.1 G/DL (ref 6–8.4)
RBC # BLD AUTO: 4.87 M/UL (ref 4–5.4)
SODIUM SERPL-SCNC: 136 MMOL/L (ref 136–145)
WBC # BLD AUTO: 8.98 K/UL (ref 3.9–12.7)

## 2020-03-11 PROCEDURE — 85025 COMPLETE CBC W/AUTO DIFF WBC: CPT

## 2020-03-11 PROCEDURE — 96366 THER/PROPH/DIAG IV INF ADDON: CPT

## 2020-03-11 PROCEDURE — 97803 MED NUTRITION INDIV SUBSEQ: CPT

## 2020-03-11 PROCEDURE — 25000003 PHARM REV CODE 250: Performed by: HOSPITALIST

## 2020-03-11 PROCEDURE — 83735 ASSAY OF MAGNESIUM: CPT

## 2020-03-11 PROCEDURE — G0378 HOSPITAL OBSERVATION PER HR: HCPCS

## 2020-03-11 PROCEDURE — 85730 THROMBOPLASTIN TIME PARTIAL: CPT

## 2020-03-11 PROCEDURE — 80053 COMPREHEN METABOLIC PANEL: CPT

## 2020-03-11 PROCEDURE — 94761 N-INVAS EAR/PLS OXIMETRY MLT: CPT

## 2020-03-11 PROCEDURE — 36415 COLL VENOUS BLD VENIPUNCTURE: CPT

## 2020-03-11 PROCEDURE — 94760 N-INVAS EAR/PLS OXIMETRY 1: CPT

## 2020-03-11 RX ADMIN — MINOCYCLINE HYDROCHLORIDE 50 MG: 50 TABLET, FILM COATED ORAL at 09:03

## 2020-03-11 RX ADMIN — APIXABAN 10 MG: 2.5 TABLET, FILM COATED ORAL at 09:03

## 2020-03-11 NOTE — PLAN OF CARE
Cm completed the discharge.  Appointments documented in avs. Pt is cleared for d/c per cm.    11:54am  Cm called [facility] and spoke with Zainab Sterio.me Tech informed me that the Apixaban is $33.  Cm informed Dr. Bhatti.       03/11/20 2693   Final Note   Assessment Type Final Discharge Note   Anticipated Discharge Disposition Home   Hospital Follow Up  Appt(s) scheduled? Yes

## 2020-03-11 NOTE — CONSULTS
Recent diagnosis of diabetes.States she saw dietician about 2 weeks ago.  Written worksheet given on AADE 7 self care behaviors.  Verbalizes compliance.

## 2020-03-11 NOTE — PLAN OF CARE
Patient AAO X 4. Patient free from injury during shift. Pain managed pharmacologically. Provided full relief. Patient free from N/V during shift. IV access has heparin drip running. Pt in therapeutic range Patient placed on cardiac monitoring. NSR. Remained afebrile during shift. Pt on room air tolerating well. Pt ambulates to restroom without difficulty.Restroom offered. Repositioned as needed. Safety maintained with bed alarm. Bed in lowest position, call light and personal items within reach. Patient verbalized understanding of care. Will continue to monitor with every 2 hour rounding.

## 2020-03-11 NOTE — HOSPITAL COURSE
Naomi Toledo was admitted for bilateral pulmonary emboli in the peripheral aspect of her lungs left greater than right.  She was not hypoxic.  She has significant pain which required IV narcotics 24 hr.  She did have reaction to morphine and required Benadryl.  She was initiated on heparin and transition to Eliquis po.  She had no provoking factors.  She had recently started exercise.  She reports history of superficial thrombophlebitis.  Venous Doppler lower extremities negative.  She is advised to continue the Eliquis and follow-up with her PCP he may consider hypercoagulable workup.  Her she did recent mammogram and colonoscopy for routine screening.  I did message her PCP also.  Her pain is improved and subsided within 24 hr so she is discharged home.  His alert oriented x3 no distress.  Lungs are clear to auscultation with decreased breath sounds in her bases but no wheezing rales or rhonchi.  Heart is Reg without murmur gallop.  Abdomen is soft and extremities show no edema.

## 2020-03-11 NOTE — TELEPHONE ENCOUNTER
Sent patient a message on portal. Patient still hospitalized. Will contact Dr. Pinto's staff when patient is discharged to get appointment.

## 2020-03-11 NOTE — RESPIRATORY THERAPY
03/10/20 2020   Patient Assessment/Suction   Level of Consciousness (AVPU) alert   PRE-TX-O2   O2 Device (Oxygen Therapy) room air   SpO2 95 %   Pulse Oximetry Type Intermittent   $ Pulse Oximetry - Single Charge Pulse Oximetry - Single

## 2020-03-11 NOTE — DISCHARGE INSTRUCTIONS
Thank you for choosing [facility] for your medical care. The primary doctor who is taking care of you at the time of your discharge is Tammy Bhatti MD.     Your were admitted to the hospital with Bilateral pulmonary embolism.     Please note your discharge instructions, including diet/activity restrictions, follow-up appointments, and medication changes.  If you have any questions about your medical issues, prescriptions, or any other questions, please feel free to contact the [facility] Medicine Dept at [phone] and we will help.    Please direct all long term medication refills and follow up to your primary care provider, Tammy Benedict MD. Thank you again for letting us take care of your health care needs.

## 2020-03-11 NOTE — TELEPHONE ENCOUNTER
Call patient and arrange hospital f/u for bilateral SD in next 2 weeks with me or SASHA. Needs heme consult for hypercoag work up. Schedule appt with Dr. Pinto

## 2020-03-11 NOTE — RESPIRATORY THERAPY
03/11/20 0837   PRE-TX-O2   O2 Device (Oxygen Therapy) room air   SpO2 97 %   Pulse Oximetry Type Intermittent   $ Pulse Oximetry - Multiple Charge Pulse Oximetry - Multiple

## 2020-03-11 NOTE — CONSULTS
Food & Nutrition                                                           Education    Diet Education: Diabetic Diet  Time Spent: 15 Minutes  Learners: Patient and significant other      Nutrition Education provided with handouts: yes      Comments: Patient is a newly diagnosed diabetic. She has received diabetic diet education recently and has drastically changed her diet, cutting out sugar and cutting back on carbs ( 30 g max per meal). She has been keeping track of her carb intakes on a phone kayla. On further assessment pt does not understand how many carb servings she needs per meals to maintain stable blood sugars and incorrectly described 15 g portion sizes of carbohydrate foods. Educated pt further on carbohydrate counting and portion sizes. Left pt with a list of portion sizes as she requested.      All questions and concerns answered. Dietitian's contact information provided.       Follow-Up: yes    Please Re-consult as needed        Thanks!

## 2020-03-13 ENCOUNTER — PATIENT MESSAGE (OUTPATIENT)
Dept: FAMILY MEDICINE | Facility: CLINIC | Age: 70
End: 2020-03-13

## 2020-03-13 DIAGNOSIS — E11.9 TYPE 2 DIABETES MELLITUS WITHOUT COMPLICATION: ICD-10-CM

## 2020-03-13 DIAGNOSIS — E11.9 TYPE 2 DIABETES MELLITUS WITHOUT COMPLICATION, UNSPECIFIED WHETHER LONG TERM INSULIN USE: ICD-10-CM

## 2020-03-15 NOTE — DISCHARGE SUMMARY
[facility] Hospital Medicine  Discharge Summary      Patient Name: Naomi Toledo  MRN: [id number]  Admission Date: 3/10/2020  Hospital Length of Stay: 1 days  Discharge Date and Time: 3/11/2020 12:28 PM  Attending Physician: Sahra att. providers found   Discharging Provider: Tammy Bhatti MD  Primary Care Provider: Tammy Benedict MD      HPI:   Naomi Toledo is a 70-year-old female with a past medical history of non-insulin-dependent diabetes, multiple sclerosis, and vitamin-D deficiency who presents to the emergency room tonight with reports of shortness of breath.  Patient states onset of shortness of breath was approximately 1 week ago, positive dyspnea on exertion.  Patient endorses left sided chest pain that has increased in severity, sharp in characteristic, located to the left lateral chest and mid back area.  Patient states pain is worse with movement, not relieved by over-the-counter aspirin.  Patient also endorses that she recently introduced 15 min of walking to her daily regimen as advised per her diabetic educator.  Patient states that she was recently started on metformin 1 month ago and Megastrol 2 weeks ago.  Patient denies having this pain before, or history of pulmonary embolism.  Patient states she did have superficial phlebitis of the right lower extremity that progressed and required emergency surgery approximately 10 years ago.  In the emergency room a CT of the chest was performed revealing bilateral pulmonary emboli.  Patient admitted to hospital on 03/10/2020 at approximately 6:30 a.m..  Patient accompanied by her  during my initial interview and physical exam.  Patient denies use of supplemental oxygen or ambulatory assist devices.    * No surgery found *      Hospital Course:   Naomi Toledo was admitted for bilateral pulmonary emboli in the peripheral aspect of her lungs left greater than right.  She was not hypoxic.  She has significant pain which required  IV narcotics 24 hr.  She did have reaction to morphine and required Benadryl.  She was initiated on heparin and transition to Eliquis po.  She had no provoking factors.  She had recently started exercise.  She reports history of superficial thrombophlebitis.  Venous Doppler lower extremities negative.  She is advised to continue the Eliquis and follow-up with her PCP he may consider hypercoagulable workup.  Her she did recent mammogram and colonoscopy for routine screening.  I did message her PCP also.  Her pain is improved and subsided within 24 hr so she is discharged home.  His alert oriented x3 no distress.  Lungs are clear to auscultation with decreased breath sounds in her bases but no wheezing rales or rhonchi.  Heart is Reg without murmur gallop.  Abdomen is soft and extremities show no edema.     Consults:   Consults (From admission, onward)        Status Ordering Provider     Inpatient consult to Diabetes educator  Once     Provider:  (Not yet assigned)    Completed NAKIA ROJAS     Inpatient consult to Registered Dietitian/Nutritionist  Once     Provider:  (Not yet assigned)    Completed NAKIA ROJAS          No new Assessment & Plan notes have been filed under this hospital service since the last note was generated.  Service: Hospital Medicine    Final Active Diagnoses:    Diagnosis Date Noted POA    PRINCIPAL PROBLEM:  Bilateral pulmonary embolism [I26.99] 03/10/2020 Yes    Type 2 diabetes mellitus without complication, without long-term current use of insulin [E11.9] 03/10/2020 Yes    Morbid obesity with BMI of 40.0-44.9, adult [E66.01, Z68.41] 05/13/2015 Not Applicable    MS (multiple sclerosis) [G35] 04/17/2014 Yes      Problems Resolved During this Admission:       Discharged Condition: good    Disposition: Home or Self Care    Follow Up:  Follow-up Information     SANDRA Rowe On 3/18/2020.    Specialty:  Internal Medicine  Why:  apt in avs  Contact information:  [id number]  [place] [id number]  [phone]             Meg Pinto MD On 4/7/2020.    Specialty:  Hematology and Oncology  Why:  hospital f/u onTuesday, 4/7/2020 @ 8:40am  Contact information:  [address]  [place] [address]  [phone]                 Patient Instructions:      Diet diabetic     Notify your health care provider if you experience any of the following:  temperature >100.4     Notify your health care provider if you experience any of the following:  difficulty breathing or increased cough     Notify your health care provider if you experience any of the following:  increased confusion or weakness     Notify your health care provider if you experience any of the following:   Order Comments: Uncontrolled bleeding/ coughing up blood     Activity as tolerated       Significant Diagnostic Studies:   Results for TREENCE ROSALES (MRN [id number]) as of 3/15/2020 17:33   Ref. Range 3/11/2020 03:58   WBC Latest Ref Range: 3.90 - 12.70 K/uL 8.98   RBC Latest Ref Range: 4.00 - 5.40 M/uL 4.87   Hemoglobin Latest Ref Range: 12.0 - 16.0 g/dL 14.4   Hematocrit Latest Ref Range: 37.0 - 48.5 % 43.5   MCV Latest Ref Range: 82 - 98 fL 89   MCH Latest Ref Range: 27.0 - 31.0 pg 29.6   MCHC Latest Ref Range: 32.0 - 36.0 g/dL 33.1   RDW Latest Ref Range: 11.5 - 14.5 % 11.6   Platelets Latest Ref Range: 150 - 350 K/uL 188     Results for TERENCE ROSALES (MRN [id number]) as of 3/15/2020 17:33   Ref. Range 3/11/2020 03:58 3/11/2020 03:58   aPTT Latest Ref Range: 21.0 - 32.0 sec 59.7 (H) 59.7 (H)   Results for TERENCE ROSALES (MRN [id number]) as of 3/15/2020 17:33   Ref. Range 3/10/2020 05:24 3/11/2020 03:58   Sodium Latest Ref Range: 136 - 145 mmol/L 138 136   Potassium Latest Ref Range: 3.5 - 5.1 mmol/L 3.8 4.1   Chloride Latest Ref Range: 95 - 110 mmol/L 106 106   CO2 Latest Ref Range: 23 - 29 mmol/L 20 (L) 18 (L)   Anion Gap Latest Ref Range: 8 - 16 mmol/L 12 12   BUN, Bld Latest Ref Range: 8 - 23 mg/dL 19 15   Creatinine  Latest Ref Range: 0.5 - 1.4 mg/dL 1.2 1.0   eGFR if non African American Latest Ref Range: >60 mL/min/1.73 m^2 47 (A) 58 (A)   eGFR if African American Latest Ref Range: >60 mL/min/1.73 m^2 54 (A) >60   Glucose Latest Ref Range: 70 - 110 mg/dL 104 188 (H)   Calcium Latest Ref Range: 8.7 - 10.5 mg/dL 9.4 9.5   Magnesium Latest Ref Range: 1.6 - 2.6 mg/dL  2.1   Alkaline Phosphatase Latest Ref Range: 55 - 135 U/L 70 70   PROTEIN TOTAL Latest Ref Range: 6.0 - 8.4 g/dL 6.9 7.1   Albumin Latest Ref Range: 3.5 - 5.2 g/dL 3.9 3.7   BILIRUBIN TOTAL Latest Ref Range: 0.1 - 1.0 mg/dL 0.9 0.7   AST Latest Ref Range: 10 - 40 U/L 16 15   ALT Latest Ref Range: 10 - 44 U/L 15 15   Troponin I Latest Ref Range: 0.000 - 0.026 ng/mL 0.006        CTA --  Impression       1. Bilateral pulmonary emboli with moderate clot burden.  2. Trace left pleural effusion and basilar airspace disease suggesting atelectasis.      Electronically signed by: Papo Lee  Date: 03/10/2020  Time: 07:56           Pending Diagnostic Studies:     None         Medications:  Reconciled Home Medications:      Medication List      START taking these medications    apixaban 5 mg Tab  Commonly known as:  ELIQUIS  Take 2 tablets (10 mg total) by mouth 2 (two) times daily. For 7 days then one tablet bid        CHANGE how you take these medications    AVONEX 30 mcg/0.5 mL Pnkt  Generic drug:  interferon beta-1a  INJECT 0.5 ML (30 MCG) INTO THE MUSCLE EVERY 7 DAYS  What changed:  additional instructions        CONTINUE taking these medications    megestroL 40 MG Tab  Commonly known as:  MEGACE     metFORMIN 500 MG XR 24hr tablet  Commonly known as:  GLUCOPHAGE-XR  Take 2 tablets (1,000 mg total) by mouth daily with breakfast.     metroNIDAZOLE 0.75 % gel  Commonly known as:  METROGEL  Apply topically 2 (two) times daily.     minocycline 50 MG Cap  Commonly known as:  MINOCIN,DYNACIN     RHOFADE 1 % Crea  Generic drug:  oxymetazoline     VITAMIN D3 125 mcg (5,000  unit) Tab  Generic drug:  cholecalciferol (vitamin D3)  Take 5,000 Units by mouth once daily.            Indwelling Lines/Drains at time of discharge:   Lines/Drains/Airways     None                 Time spent on the discharge of patient: 35 minutes  Patient was seen and examined on the date of discharge and determined to be suitable for discharge.         Tammy Bhatti MD  Department of Hospital Medicine  [facility]

## 2020-03-17 ENCOUNTER — DOCUMENTATION ONLY (OUTPATIENT)
Dept: FAMILY MEDICINE | Facility: CLINIC | Age: 70
End: 2020-03-17

## 2020-03-17 NOTE — PROGRESS NOTES
Pre-Visit Chart Review  For Appointment Scheduled on (03/18/20)    Health Maintenance Due   Topic Date Due    Foot Exam  02/28/1960    Low Dose Statin  02/28/1973    Urine Microalbumin  12/29/2018    Eye Exam  11/01/2019

## 2020-03-18 ENCOUNTER — OFFICE VISIT (OUTPATIENT)
Dept: FAMILY MEDICINE | Facility: CLINIC | Age: 70
End: 2020-03-18
Payer: MEDICARE

## 2020-03-18 ENCOUNTER — DOCUMENTATION ONLY (OUTPATIENT)
Dept: FAMILY MEDICINE | Facility: CLINIC | Age: 70
End: 2020-03-18

## 2020-03-18 VITALS
BODY MASS INDEX: 40.43 KG/M2 | HEIGHT: 66 IN | OXYGEN SATURATION: 96 % | HEART RATE: 92 BPM | SYSTOLIC BLOOD PRESSURE: 122 MMHG | WEIGHT: 251.56 LBS | DIASTOLIC BLOOD PRESSURE: 66 MMHG | TEMPERATURE: 99 F

## 2020-03-18 DIAGNOSIS — Z09 HOSPITAL DISCHARGE FOLLOW-UP: Primary | ICD-10-CM

## 2020-03-18 DIAGNOSIS — E11.9 TYPE 2 DIABETES MELLITUS WITHOUT COMPLICATION, WITHOUT LONG-TERM CURRENT USE OF INSULIN: ICD-10-CM

## 2020-03-18 DIAGNOSIS — I26.99 BILATERAL PULMONARY EMBOLISM: ICD-10-CM

## 2020-03-18 PROCEDURE — 99999 PR PBB SHADOW E&M-EST. PATIENT-LVL III: CPT | Mod: PBBFAC,,, | Performed by: PHYSICIAN ASSISTANT

## 2020-03-18 PROCEDURE — 99213 OFFICE O/P EST LOW 20 MIN: CPT | Mod: PBBFAC,PO | Performed by: PHYSICIAN ASSISTANT

## 2020-03-18 PROCEDURE — 99214 PR OFFICE/OUTPT VISIT, EST, LEVL IV, 30-39 MIN: ICD-10-PCS | Mod: S$PBB,,, | Performed by: PHYSICIAN ASSISTANT

## 2020-03-18 PROCEDURE — 99214 OFFICE O/P EST MOD 30 MIN: CPT | Mod: S$PBB,,, | Performed by: PHYSICIAN ASSISTANT

## 2020-03-18 PROCEDURE — 99999 PR PBB SHADOW E&M-EST. PATIENT-LVL III: ICD-10-PCS | Mod: PBBFAC,,, | Performed by: PHYSICIAN ASSISTANT

## 2020-03-18 RX ORDER — MISOPROSTOL 100 UG/1
TABLET ORAL
COMMUNITY
Start: 2020-01-29 | End: 2020-03-18

## 2020-03-18 NOTE — PROGRESS NOTES
Pre-Visit Chart Review  For Appointment Scheduled on (3/18/20)    Health Maintenance Due   Topic Date Due    Foot Exam  02/28/1960    Low Dose Statin  02/28/1973    Urine Microalbumin  12/29/2018    Eye Exam  11/01/2019

## 2020-03-18 NOTE — PROGRESS NOTES
Subjective:       Patient ID: Naomi Toledo is a 70 y.o. female.    Chief Complaint: Hospital Follow Up    Patient with multiple sclerosis and type 2 diabetes presents for hospital follow-up.She was admitted for bilateral pulmonary emboli in the peripheral aspect of her lungs left greater than right.  She was not hypoxic.  She has significant pain which required IV narcotics 24 hr.  She did have reaction to morphine and required Benadryl.  She was initiated on heparin and transition to Eliquis po.  She had no provoking factors.  She had recently started exercise.  She reports history of superficial thrombophlebitis.  Venous Doppler lower extremities negative.   Her she did recent mammogram and colonoscopy for routine screening..  Her pain is improved and subsided within 24 hr so she is discharged home.  Patient was discharged home with Eliquis.  She states that her symptoms have completely resolved.  She is tolerating the Eliquis without any problems.  She is scheduled for follow-up with Hematology for hyper coagulable workup.  She has no other complaints at this time  Patients patient medical/surgical, social and family histories have been reviewed       Review of Systems   Constitutional: Negative for fatigue and fever.   HENT: Negative for nosebleeds.    Respiratory: Negative for cough, chest tightness, shortness of breath and wheezing.    Cardiovascular: Negative for chest pain, palpitations and leg swelling.   Gastrointestinal: Negative for anal bleeding and blood in stool.   Genitourinary: Negative for hematuria.   Skin: Negative for color change and rash.   Hematological: Does not bruise/bleed easily.       Objective:      Physical Exam   Constitutional: She is cooperative. No distress.   Obese body habitus    Cardiovascular: Normal rate, regular rhythm and normal heart sounds.   Pulmonary/Chest: Effort normal and breath sounds normal.   Musculoskeletal:        Right lower leg: She exhibits no tenderness  and no edema.        Left lower leg: She exhibits no tenderness and no edema.   Neurological: She is alert.   Skin: Skin is warm and dry. Capillary refill takes less than 2 seconds. No rash noted.   Vitals reviewed.      Assessment:       1. Hospital discharge follow-up    2. Bilateral pulmonary embolism    3. Type 2 diabetes mellitus without complication, without long-term current use of insulin        Plan:       Naomi was seen today for hospital follow up.    Diagnoses and all orders for this visit:    Hospital discharge follow-up    Bilateral pulmonary embolism  Follow-up as scheduled with Hematology  Type 2 diabetes mellitus without complication, without long-term current use of insulin currently at goal  Continue current medications and follow up routine visit PCP as scheduled            DISCLAIMER: This note was prepared with [employer] voice recognition transcription software. Garbled syntax, mangled pronouns, and other bizarre constructions may be attributed to that software system

## 2020-03-20 ENCOUNTER — PATIENT MESSAGE (OUTPATIENT)
Dept: ADMINISTRATIVE | Facility: OTHER | Age: 70
End: 2020-03-20

## 2020-03-20 ENCOUNTER — PATIENT OUTREACH (OUTPATIENT)
Dept: OTHER | Facility: OTHER | Age: 70
End: 2020-03-20

## 2020-03-20 ENCOUNTER — PATIENT MESSAGE (OUTPATIENT)
Dept: FAMILY MEDICINE | Facility: CLINIC | Age: 70
End: 2020-03-20

## 2020-03-20 DIAGNOSIS — I26.99 BILATERAL PULMONARY EMBOLISM: ICD-10-CM

## 2020-03-20 DIAGNOSIS — E11.9 TYPE 2 DIABETES MELLITUS WITHOUT COMPLICATION, WITHOUT LONG-TERM CURRENT USE OF INSULIN: Primary | ICD-10-CM

## 2020-03-20 RX ORDER — LANCETS
EACH MISCELLANEOUS
Qty: 100 EACH | Refills: 3 | Status: SHIPPED | OUTPATIENT
Start: 2020-03-20

## 2020-03-20 RX ORDER — INSULIN PUMP SYRINGE, 3 ML
EACH MISCELLANEOUS
Qty: 1 EACH | Refills: 0 | Status: SHIPPED | OUTPATIENT
Start: 2020-03-20 | End: 2021-03-20

## 2020-03-23 DIAGNOSIS — E11.9 TYPE 2 DIABETES MELLITUS: ICD-10-CM

## 2020-03-24 ENCOUNTER — PATIENT MESSAGE (OUTPATIENT)
Dept: FAMILY MEDICINE | Facility: CLINIC | Age: 70
End: 2020-03-24

## 2020-03-24 NOTE — TELEPHONE ENCOUNTER
LOV: 3/18/2020  Next appt: 3/30/2020  Labs: 3/11/2020  Last refill: 3/11/2020 sent to [facility], needs sent to [facility]

## 2020-03-24 NOTE — TELEPHONE ENCOUNTER
Yes this referral can be changed to Dr. Moncada.    Will you please contact patient? And resend referral to Dr. Moncada's que.( I cannot change the referral.  Dr. Benedict will have to re-enter referral.    Thanks

## 2020-03-25 ENCOUNTER — PATIENT MESSAGE (OUTPATIENT)
Dept: FAMILY MEDICINE | Facility: CLINIC | Age: 70
End: 2020-03-25

## 2020-03-25 RX ORDER — INSULIN PUMP SYRINGE, 3 ML
EACH MISCELLANEOUS
Qty: 1 EACH | Refills: 0 | Status: SHIPPED | OUTPATIENT
Start: 2020-03-25 | End: 2021-03-25

## 2020-03-27 NOTE — TELEPHONE ENCOUNTER
Please re-send with amended instructions. [facility] states it says to take one tablet twice a day for 7 days then after that take 1 tablet BID. Please advise.

## 2020-03-30 ENCOUNTER — OFFICE VISIT (OUTPATIENT)
Dept: FAMILY MEDICINE | Facility: CLINIC | Age: 70
End: 2020-03-30
Payer: MEDICARE

## 2020-03-30 ENCOUNTER — PATIENT MESSAGE (OUTPATIENT)
Dept: FAMILY MEDICINE | Facility: CLINIC | Age: 70
End: 2020-03-30

## 2020-03-30 DIAGNOSIS — E11.9 TYPE 2 DIABETES MELLITUS WITHOUT COMPLICATION, WITHOUT LONG-TERM CURRENT USE OF INSULIN: ICD-10-CM

## 2020-03-30 DIAGNOSIS — E66.01 MORBID OBESITY WITH BMI OF 40.0-44.9, ADULT: ICD-10-CM

## 2020-03-30 DIAGNOSIS — Z79.899 ENCOUNTER FOR LONG-TERM (CURRENT) USE OF HIGH-RISK MEDICATION: ICD-10-CM

## 2020-03-30 DIAGNOSIS — E11.69 HYPERLIPIDEMIA ASSOCIATED WITH TYPE 2 DIABETES MELLITUS: ICD-10-CM

## 2020-03-30 DIAGNOSIS — E78.5 HYPERLIPIDEMIA ASSOCIATED WITH TYPE 2 DIABETES MELLITUS: ICD-10-CM

## 2020-03-30 DIAGNOSIS — I26.99 BILATERAL PULMONARY EMBOLISM: Primary | ICD-10-CM

## 2020-03-30 PROCEDURE — 99214 PR OFFICE/OUTPT VISIT, EST, LEVL IV, 30-39 MIN: ICD-10-PCS | Mod: 95,,, | Performed by: FAMILY MEDICINE

## 2020-03-30 PROCEDURE — 99214 OFFICE O/P EST MOD 30 MIN: CPT | Mod: 95,,, | Performed by: FAMILY MEDICINE

## 2020-03-30 NOTE — PROGRESS NOTES
Subjective:       Patient ID: Naomi Toledo is a 70 y.o. female.    Chief Complaint: No chief complaint on file.    HPI  Review of Systems   Constitutional: Positive for activity change. Negative for fever and unexpected weight change.   HENT: Negative for dental problem, ear pain, facial swelling, hearing loss, rhinorrhea, sore throat, trouble swallowing and voice change.    Eyes: Negative for discharge and visual disturbance.   Respiratory: Negative for chest tightness and wheezing.    Cardiovascular: Negative for chest pain and palpitations.   Gastrointestinal: Negative for blood in stool, constipation, diarrhea and vomiting.   Endocrine: Negative for polydipsia and polyuria.   Genitourinary: Negative for difficulty urinating, dysuria, hematuria, menstrual problem and vaginal bleeding.   Musculoskeletal: Negative for arthralgias, joint swelling and neck pain.   Neurological: Negative for weakness and headaches.   Psychiatric/Behavioral: Negative for confusion and dysphoric mood.       Patient Active Problem List   Diagnosis    Hyperglycemia    MS (multiple sclerosis)    Positive NURIA (antinuclear antibody)    Encounter for long-term (current) use of high-risk medication    Urinary urgency    Morbid obesity with BMI of 40.0-44.9, adult    Acne rosacea    Bilateral pulmonary embolism    Type 2 diabetes mellitus without complication, without long-term current use of insulin     Patient is here for a chronic conditions follow up.    The patient location is: home in [place], [place]  The chief complaint leading to consultation is: f/u  Visit type: Virtual visit with synchronous audio and video  Total time spent with patient: 20 minutes  Each patient to whom he or she provides medical services by telemedicine is:  (1) informed of the relationship between the physician and patient and the respective role of any other health care provider with respect to management of the patient; and (2) notified that he or she  may decline to receive medical services by telemedicine and may withdraw from such care at any time.    Notes: see below    Recent diagnosis of ihsan PE 3/10/20 . Had mercado and some CP.  On eliquis now.  Sx have resolved. No clot in LE .  No h/o clots. Has appt Dr. Pinto hypercoag work up-can postpone 3 months and Dr. Moncada 5/25/20 as well for another opinion on how long she will need to be on blood thinner    GYN Dr. Hernandez post menopausal bleeding. EMB done since last visit- benign. On megace x 2 months and f/u u/s planned    GI Dr. Rich fatty liver. Had colonoscopy and had 2 benign polyps and needs surveillance in 5 years    Having recurrence of right submandibular area swelling and discomfort similar to last year.  Had sialadenitis treated with conservative tx with Dr. Garcia last year and sx resolved. Having recurrence. No facial swelling or fever      Still trying to sell her home and move to [facility].    Objective:      Physical Exam   Constitutional: She appears well-developed and well-nourished.   Pulmonary/Chest: Effort normal.   Neurological: She is alert.   Psychiatric: She has a normal mood and affect. Her behavior is normal. Thought content normal.       Assessment:       1. Bilateral pulmonary embolism    2. Type 2 diabetes mellitus without complication, without long-term current use of insulin    3. Morbid obesity with BMI of 40.0-44.9, adult    4. Encounter for long-term (current) use of high-risk medication    5. Hyperlipidemia associated with type 2 diabetes mellitus        Plan:         1. Bilateral pulmonary embolism  Continue and plan for hypercoag work up Heme/Onc  - apixaban (ELIQUIS) 5 mg Tab; Take 1 tablet (5 mg total) by mouth 2 (two) times daily.  Dispense: 28 tablet; Refill: 0    2. Type 2 diabetes mellitus without complication, without long-term current use of insulin  Stable condition.  Continue current medications.  Will adjust based on lab findings or if condition changes.    -  "CBC auto differential; Future  - Comprehensive metabolic panel; Future  - Hemoglobin A1c; Future  - Microalbumin/creatinine urine ratio; Future    3. Morbid obesity with BMI of 40.0-44.9, adult  Counseled patient on his ideal body weight, health consequences of being obese and current recommendations including weekly exercise and a heart healthy diet.  Current BMI is:Estimated body mass index is 40.6 kg/m² as calculated from the following:    Height as of 3/18/20: 5' 6" (1.676 m).    Weight as of 3/18/20: 114.1 kg (251 lb 8.7 oz)..  Patient is aware that ideal BMI < 25 or  .    Has been working on weight loss. Walking 4 miles a day and lost 28 lbs!    4. Encounter for long-term (current) use of high-risk medication  See above    5. Hyperlipidemia associated with type 2 diabetes mellitus  Stable condition.  Continue current medications.  Will adjust based on lab findings or if condition changes.    - Lipid panel; Future        Time spent with patient: 20 minutes    Patient with be reevaluated in 3 months or sooner prn    Greater than 50% of this visit was spent counseling as described in above documentation:Yes  "

## 2020-04-02 ENCOUNTER — TELEPHONE (OUTPATIENT)
Dept: HEMATOLOGY/ONCOLOGY | Facility: CLINIC | Age: 70
End: 2020-04-02

## 2020-04-02 NOTE — TELEPHONE ENCOUNTER
Spoke with patient in regards to changing 4/7 appt to virtual visit.  Patient confirms that she has participated in a VV and understands how to access the appt.  Patient verbalized understanding.  No further questions at this time.

## 2020-04-06 ENCOUNTER — PATIENT MESSAGE (OUTPATIENT)
Dept: NEUROLOGY | Facility: CLINIC | Age: 70
End: 2020-04-06

## 2020-04-07 ENCOUNTER — OFFICE VISIT (OUTPATIENT)
Dept: HEMATOLOGY/ONCOLOGY | Facility: CLINIC | Age: 70
End: 2020-04-07
Payer: MEDICARE

## 2020-04-07 ENCOUNTER — PATIENT OUTREACH (OUTPATIENT)
Dept: ADMINISTRATIVE | Facility: HOSPITAL | Age: 70
End: 2020-04-07

## 2020-04-07 VITALS — SYSTOLIC BLOOD PRESSURE: 112 MMHG | BODY MASS INDEX: 39.06 KG/M2 | WEIGHT: 242 LBS | DIASTOLIC BLOOD PRESSURE: 78 MMHG

## 2020-04-07 DIAGNOSIS — R76.8 POSITIVE ANA (ANTINUCLEAR ANTIBODY): ICD-10-CM

## 2020-04-07 DIAGNOSIS — I26.99 BILATERAL PULMONARY EMBOLISM: Primary | ICD-10-CM

## 2020-04-07 DIAGNOSIS — Z79.01 ANTICOAGULATED: ICD-10-CM

## 2020-04-07 DIAGNOSIS — D68.59 HYPERCOAGULABLE STATE: ICD-10-CM

## 2020-04-07 DIAGNOSIS — G35 MS (MULTIPLE SCLEROSIS): ICD-10-CM

## 2020-04-07 DIAGNOSIS — E11.9 TYPE 2 DIABETES MELLITUS WITHOUT COMPLICATION, WITHOUT LONG-TERM CURRENT USE OF INSULIN: ICD-10-CM

## 2020-04-07 PROCEDURE — 99205 OFFICE O/P NEW HI 60 MIN: CPT | Mod: 95,,, | Performed by: INTERNAL MEDICINE

## 2020-04-07 PROCEDURE — 99205 PR OFFICE/OUTPT VISIT, NEW, LEVL V, 60-74 MIN: ICD-10-PCS | Mod: 95,,, | Performed by: INTERNAL MEDICINE

## 2020-04-07 NOTE — PROGRESS NOTES
The patient location is:  At home  The chief complaint leading to consultation is:  I had a blood clot   Patient has consented to this visit via televisit  Visit type: Virtual visit with synchronous audio and video plus interrupted audio   Total time spent with patient: over 50 min with more than 90% on medical discussion, counseling and coordination of care.  Each patient to whom he or she provides medical services by telemedicine is:  (1) informed of the relationship between the physician and patient and the respective role of any other health care provider with respect to management of the patient; and (2) notified that he or she may decline to receive medical services by telemedicine and may withdraw from such care at any time.    This visit is to also involve counseling, patient education, informed consent for ordered diagnostic and laboratory tests and motivational interviewing      Naomi Toledo is a 70 y.o.     This is a 70-year-old female who presented to the emergency room on March 10 with increasing shortness of breath and chest pain.  CT of chest was performed which revealed bilateral pulmonary emboli with moderate clot burden.  Of note was an incidental finding of a 3 mm right upper lobe lung nodule which had been there prior and was noted as stable.  Patient had been on Megace for weight loss associated with multiple sclerosis as well as interferon beta.  She currently is tolerating Eliquis 5 mg p.o. b.i.d. without any complications and no evidence of bleeding.  Ultrasound of her legs were performed revealing no evidence of thromboembolism  She was on megace for gyn purposes.     Imaging March 10, 2020 CT of chest revealed  Chart and records reviewed    Impression       1. Bilateral pulmonary emboli with moderate clot burden.  2. Trace left pleural effusion and basilar airspace disease suggesting atelectasis.      Electronically signed by: Papo Lee  Date: 03/10/2020  Time: 07:56       April 7    Pt has been checking her BP  Running 112/63 , she had one episode 95/55; She is not on any medicine for hypertension  She reports she has completely changed her diet after being labeled with diabetes she is following the [place] diabetic diet  She has lost 33 pounds in the last 3 pounds   Weight is 242 pounds     Past Medical History:   Diagnosis Date    Blood transfusion     Deep vein thrombosis     MS (multiple sclerosis)     Plantar fasciitis of left foot 2/2014     Past Surgical History:   Procedure Laterality Date    OVARIAN CYST SURGERY  1974    TUBAL LIGATION      VEIN SURGERY         Current Outpatient Medications:     apixaban (ELIQUIS) 5 mg Tab, Take 1 tablet (5 mg total) by mouth 2 (two) times daily., Disp: 28 tablet, Rfl: 0    AVONEX 30 mcg/0.5 mL PnKt, INJECT 0.5 ML (30 MCG) INTO THE MUSCLE EVERY 7 DAYS (Patient taking differently: INJECT 0.5 ML (30 MCG) INTO THE MUSCLE EVERY 7 DAYS done on sundays), Disp: 12 pen, Rfl: 1    blood sugar diagnostic Strp, 1 x day testing, Disp: 100 strip, Rfl: 3    blood-glucose meter (FREESTYLE FREEDOM) kit, Use as instructed, Disp: 1 each, Rfl: 0    blood-glucose meter kit, Use as instructed, Disp: 1 each, Rfl: 0    cholecalciferol, vitamin D3, (VITAMIN D3) 5,000 unit Tab, Take 5,000 Units by mouth once daily., Disp: , Rfl:     lancets (ACCU-CHEK SOFTCLIX LANCETS) Misc, Test 1 x day, Disp: 100 each, Rfl: 3    megestroL (MEGACE) 40 MG Tab, , Disp: , Rfl:     metFORMIN (GLUCOPHAGE-XR) 500 MG 24 hr tablet, Take 2 tablets (1,000 mg total) by mouth daily with breakfast., Disp: 180 tablet, Rfl: 3    minocycline (MINOCIN,DYNACIN) 50 MG Cap, , Disp: , Rfl:     RHOFADE 1 % Crea, , Disp: , Rfl:   Review of patient's allergies indicates:   Allergen Reactions    Morphine Itching     Social History     Tobacco Use    Smoking status: Never Smoker    Smokeless tobacco: Never Used   Substance Use Topics    Alcohol use: No     Frequency: Never    Drug use: No      Family History   Problem Relation Age of Onset    Cancer Mother         breast    Breast cancer Mother 50    Hypertension Father     Cancer Sister     Hypertension Sister     Breast cancer Sister 64    Cancer Brother     Diabetes Maternal Grandmother     Leukemia Paternal Grandmother        CONSTITUTIONAL: No fevers, chills, night sweats, wt. loss, appetite changes  SKIN: no rashes or itching  ENT: No headaches, head trauma, vision changes, or eye pain  LYMPH NODES: None noticed   CV: No chest pain, palpitations.   RESP: No dyspnea on exertion, cough, wheezing, or hemoptysis  GI: No nausea, emesis, diarrhea, constipation, melena, hematochezia, pain.   : No dysuria, hematuria, urgency, or frequency   HEME: No easy bruising, bleeding problems  PSYCHIATRIC: No depression, anxiety, psychosis, hallucinations.  NEURO: No seizures, memory loss, dizziness or difficulty sleeping  MSK: No arthralgias or joint swelling      General patient is in no distress well developed well nourished  Psych pleasant affect no evidence of depression no evidence of anxiety  Head normocephalic atraumatic, no hoarseness,  well-spoken speech intact  Eyes noninjected sclera conjunctiva appear pink  Face is symmetric  Skin intact no lesions noted no ecchymosis no rash  Neck with no limited range of motion no JVD evident  Chest with no use of accessory muscles no labored breathing no evidence of tachypnea  No respiratory distress, effort normal   Abdomen appears to be nondistended  Extremities with no cyanosis no evidence of edema  Neuro muscular cranial nerves appear grossly intact  Gait appears steady  No joint effusions  Behavior normal judgment normal      Lab Results   Component Value Date    WBC 8.98 03/11/2020    HGB 14.4 03/11/2020    HCT 43.5 03/11/2020    MCV 89 03/11/2020     03/11/2020         CMP  Sodium   Date Value Ref Range Status   03/11/2020 136 136 - 145 mmol/L Final     Potassium   Date Value Ref Range  Status   03/11/2020 4.1 3.5 - 5.1 mmol/L Final     Chloride   Date Value Ref Range Status   03/11/2020 106 95 - 110 mmol/L Final     CO2   Date Value Ref Range Status   03/11/2020 18 (L) 23 - 29 mmol/L Final     Glucose   Date Value Ref Range Status   03/11/2020 188 (H) 70 - 110 mg/dL Final     BUN, Bld   Date Value Ref Range Status   03/11/2020 15 8 - 23 mg/dL Final     Creatinine   Date Value Ref Range Status   03/11/2020 1.0 0.5 - 1.4 mg/dL Final     Calcium   Date Value Ref Range Status   03/11/2020 9.5 8.7 - 10.5 mg/dL Final     Total Protein   Date Value Ref Range Status   03/11/2020 7.1 6.0 - 8.4 g/dL Final     Albumin   Date Value Ref Range Status   03/11/2020 3.7 3.5 - 5.2 g/dL Final     Total Bilirubin   Date Value Ref Range Status   03/11/2020 0.7 0.1 - 1.0 mg/dL Final     Comment:     For infants and newborns, interpretation of results should be based  on gestational age, weight and in agreement with clinical  observations.  Premature Infant recommended reference ranges:  Up to 24 hours.............<8.0 mg/dL  Up to 48 hours............<12.0 mg/dL  3-5 days..................<15.0 mg/dL  6-29 days.................<15.0 mg/dL       Alkaline Phosphatase   Date Value Ref Range Status   03/11/2020 70 55 - 135 U/L Final     AST   Date Value Ref Range Status   03/11/2020 15 10 - 40 U/L Final     ALT   Date Value Ref Range Status   03/11/2020 15 10 - 44 U/L Final     Anion Gap   Date Value Ref Range Status   03/11/2020 12 8 - 16 mmol/L Final     eGFR if    Date Value Ref Range Status   03/11/2020 >60 >60 mL/min/1.73 m^2 Final     eGFR if non    Date Value Ref Range Status   03/11/2020 58 (A) >60 mL/min/1.73 m^2 Final     Comment:     Calculation used to obtain the estimated glomerular filtration  rate (eGFR) is the CKD-EPI equation.          Bilateral pulmonary embolism  -     Ambulatory referral/consult to Hematology / Oncology  -     apixaban (ELIQUIS) 5 mg Tab; Take 1 tablet (5  mg total) by mouth 2 (two) times daily.  Dispense: 60 tablet; Refill: 1  -     CTA CHEST; Future; Expected date: 05/27/2020    MS (multiple sclerosis)    Positive NURIA (antinuclear antibody)    Type 2 diabetes mellitus without complication, without long-term current use of insulin    Hypercoagulable state    Anticoagulated on eliquis      Patient must stop the use of Megace as this can increase the risk of thromboembolism  She needs close surveillance for secondary malignancy such as breast cancer while taking interferon beta: mammogram negative September 2019  It would be safer to start tumor recs to help with symptoms of inflammation  She is to continue Eliquis 5 mg p.o. b.i.d. for at least 3 months for a suspected provoked event due to adverse effect of the medication  I recommend 3 months of Eliquis , repeat cta chest and re evaluate at that time  SHe may be moving to the [place] , but will let me know so that I can help get referrals in place once she moves  SHe will F/U with GYN  Dr Hernandez   She discussed the possible sola for a hysterectomy. I would prefer se wait until after anticoagulated for at least 3 months unless she develops uterine bleeding while on eliquis     Thank you for allowing me to evaluate and participate in the care of this pleasant patient. Please fell free to call me with any questions or concerns.    Warmly,  Meg Pinto MD    This note was dictated with Dragon and briefly proofread. Please excuse any sentences that may be unclear or words misspelled

## 2020-04-07 NOTE — PROGRESS NOTES
Detail Level: Zone External colonoscopy updated in chart.   Request sent for eye exam.   Detail Level: Simple

## 2020-04-08 ENCOUNTER — PATIENT MESSAGE (OUTPATIENT)
Dept: HEMATOLOGY/ONCOLOGY | Facility: CLINIC | Age: 70
End: 2020-04-08

## 2020-04-08 ENCOUNTER — PATIENT OUTREACH (OUTPATIENT)
Dept: ADMINISTRATIVE | Facility: HOSPITAL | Age: 70
End: 2020-04-08

## 2020-04-09 ENCOUNTER — PATIENT MESSAGE (OUTPATIENT)
Dept: FAMILY MEDICINE | Facility: CLINIC | Age: 70
End: 2020-04-09

## 2020-04-09 DIAGNOSIS — I26.99 BILATERAL PULMONARY EMBOLISM: Primary | ICD-10-CM

## 2020-04-09 DIAGNOSIS — Z79.01 ANTICOAGULATED: ICD-10-CM

## 2020-04-12 ENCOUNTER — PATIENT MESSAGE (OUTPATIENT)
Dept: FAMILY MEDICINE | Facility: CLINIC | Age: 70
End: 2020-04-12

## 2020-04-13 ENCOUNTER — TELEPHONE (OUTPATIENT)
Dept: FAMILY MEDICINE | Facility: CLINIC | Age: 70
End: 2020-04-13

## 2020-04-13 ENCOUNTER — PATIENT MESSAGE (OUTPATIENT)
Dept: HEMATOLOGY/ONCOLOGY | Facility: CLINIC | Age: 70
End: 2020-04-13

## 2020-04-13 NOTE — TELEPHONE ENCOUNTER
Spoke with patient and she stated she's having a little bleeding and just wanted Dr. Benedict to be informed. Patient stated her PCP wasn't in the office today and the nurse at the office said they will give her a call back tomorrow other than that the patient is doing well.

## 2020-04-14 ENCOUNTER — PATIENT OUTREACH (OUTPATIENT)
Dept: DIABETES | Facility: CLINIC | Age: 70
End: 2020-04-14

## 2020-04-14 NOTE — PATIENT INSTRUCTIONS
F/u call to pt to check on Dm status.  Pt states she is doing well (besides hospitalization for PE).  Has been closely following 30 grams of carbs per meal (90 grams total for day); purchased [facility] DM book for additional info; keeping track of food intake on phone kayla; has lost 32# since Feb.  Despite MS, pt currently walking 3-5 miles daily for exercise.  Drinking mostly water.  Still does not have glucometer; will contact pharmacy to check OR will consider Relion meter from [email or web address].  RD available by phone or portal for questions/concerns.

## 2020-04-16 ENCOUNTER — TELEPHONE (OUTPATIENT)
Dept: HEMATOLOGY/ONCOLOGY | Facility: CLINIC | Age: 70
End: 2020-04-16

## 2020-04-16 DIAGNOSIS — Z79.01 ANTICOAGULATED: Primary | ICD-10-CM

## 2020-04-20 ENCOUNTER — PATIENT OUTREACH (OUTPATIENT)
Dept: OTHER | Facility: OTHER | Age: 70
End: 2020-04-20

## 2020-04-20 ENCOUNTER — OFFICE VISIT (OUTPATIENT)
Dept: NEUROLOGY | Facility: CLINIC | Age: 70
End: 2020-04-20
Payer: MEDICARE

## 2020-04-20 DIAGNOSIS — G35 MS (MULTIPLE SCLEROSIS): Primary | ICD-10-CM

## 2020-04-20 DIAGNOSIS — E11.9 TYPE 2 DIABETES MELLITUS WITHOUT COMPLICATION, WITHOUT LONG-TERM CURRENT USE OF INSULIN: ICD-10-CM

## 2020-04-20 DIAGNOSIS — Z71.89 COUNSELING REGARDING GOALS OF CARE: ICD-10-CM

## 2020-04-20 DIAGNOSIS — Z29.89 PROPHYLACTIC IMMUNOTHERAPY: ICD-10-CM

## 2020-04-20 DIAGNOSIS — R26.9 GAIT DISTURBANCE: ICD-10-CM

## 2020-04-20 PROCEDURE — 99214 PR OFFICE/OUTPT VISIT, EST, LEVL IV, 30-39 MIN: ICD-10-PCS | Mod: 95,,, | Performed by: PSYCHIATRY & NEUROLOGY

## 2020-04-20 PROCEDURE — 99214 OFFICE O/P EST MOD 30 MIN: CPT | Mod: 95,,, | Performed by: PSYCHIATRY & NEUROLOGY

## 2020-04-20 NOTE — PROGRESS NOTES
"Subjective:          Patient ID: Naomi Toledo is a 70 y.o. female who presents today for a routine video visit for MS.      MS HPI:  · DMT: interferon beta-1a IM  · Side effects from DMT? No  · Taking vitamin D3 as recommended? Yes - 5,000 IU  · Pt has had new medical issues since her last visit.   · Diagnosed with Type 2 DM in January and placed on Metformin  · In March of 2020 admitted with bilateral pulmonary embolism--admitted x 1 night; now on Eliquis;   · She's also being actively worked-up by her GYN for abnormal uterine bleeding--biopsy earlier this year negative  · Pt states she has lost 35 pounds since being diagnosed with diabetes--she changed her diet, and walks 3-4 miles per day.   · Her move to [facility] is on hold for now...but once she moves, wants to establish care at [facility].   · States "my walking is fine"    Medications:  Current Outpatient Medications   Medication Sig    apixaban (ELIQUIS) 5 mg Tab Take 1 tablet (5 mg total) by mouth 2 (two) times daily.    AVONEX 30 mcg/0.5 mL PnKt INJECT 0.5 ML (30 MCG) INTO THE MUSCLE EVERY 7 DAYS (Patient taking differently: INJECT 0.5 ML (30 MCG) INTO THE MUSCLE EVERY 7 DAYS done on sundays)    blood sugar diagnostic Strp 1 x day testing    blood-glucose meter (FREESTYLE FREEDOM) kit Use as instructed    blood-glucose meter kit Use as instructed    cholecalciferol, vitamin D3, (VITAMIN D3) 5,000 unit Tab Take 5,000 Units by mouth once daily.    lancets (ACCU-CHEK SOFTCLIX LANCETS) Misc Test 1 x day    metFORMIN (GLUCOPHAGE-XR) 500 MG 24 hr tablet Take 2 tablets (1,000 mg total) by mouth daily with breakfast.    minocycline (MINOCIN,DYNACIN) 50 MG Cap     RHOFADE 1 % Crea      No current facility-administered medications for this visit.        SOCIAL HISTORY  Social History     Tobacco Use    Smoking status: Never Smoker    Smokeless tobacco: Never Used   Substance Use Topics    Alcohol use: No     Frequency: Never    Drug use: No "       Living arrangements - the patient lives with their spouse.    ROS:    REVIEW OF SYMPTOMS 4/17/2020   Do you feel abnormally tired on most days? No   Do you feel you generally sleep well? Yes   Do you have difficulty controlling your bladder?  No   Do you have difficulty controlling your bowels?  No   Do you have frequent muscle cramps, tightness or spasms in your limbs?  No   Do you have new visual symptoms?  No   Do you have worsening difficulty with your memory or thinking? No   Do you have worsening symptoms of anxiety or depression?  No   For patients who walk, Do you have more difficulty walking?  No   Have you fallen since your last visit?  No   For patients who use wheelchairs: Do you have any skin wounds or breakdown? Not Applicable   Do you have difficulty using your hands?  Yes--difficulty with fine motor left hand, but NOT worse than usual   Do you have shooting or burning pain? No   Do you have difficulty with sexual function?  No   If you are sexually active, are you using birth control? Y/N  N/A No   Do you often choke when swallowing liquids or solid food?  No   Do you experience worsening symptoms when overheated? No   Do you need any new equipment such as a wheelchair, walker or shower chair? No   Do you receive co-pay financial assistance for your principal MS medicine? No   Would you be interested in participating in an MS research trial in the future? No   For patients on Gilenya, Tecfidera, Aubagio, Rituxan, Ocrevus, Tysabri, Lemtrada or Methotrexate, are you aware that you should NOT receive live virus vaccines?  Not Applicable   Do you feel you have adequate family/friend support?  Yes   Do you have health insurance?   Yes   Are you currently employed? No   Do you receive SSDI/SSI?  Not Applicable   Do you use marijuana or cannabis products? No   Have you been diagnosed with a urinary tract infection since your last visit here? No   Have you been diagnosed with a respiratory tract  infection since your last visit here? No   Have you been to the emergency room since your last visit here? Yes--diagnosed with PE in March   Have you been hospitalized since your last visit here?  Yes--diagnosed with PE in March                Objective:      Neurologic Exam  MS: alert, fluent, cognition grossly intact  CN: no obvious nystagmus, no dysarthria, no facial asymmetry  MOTOR: moves all limbs with full range of motion  COORD: FTN normal  GAIT: slightly wide based; turns well    Imaging:     Results for orders placed during the hospital encounter of 11/14/19   MRI Brain Demyelinating Without Contrast    Impression Multiple foci of white matter abnormality consistent with the patient's given history of multiple sclerosis.  No diffusion positivity is identified on today's examination to suggest active demyelination.  Mild progression of T2 signal abnormality is suspected since the prior with a more obvious focus in the subcortical left frontal lobe.      Electronically signed by: Denny Johnson MD  Date:    11/14/2019  Time:    15:06     No results found for this or any previous visit.  No results found for this or any previous visit.  Results for orders placed during the hospital encounter of 11/13/18   MRI Brain Demyelinating W W/O Contrast    Impression 1. There is a stable burden of white matter disease scattered throughout the periventricular, pericallosal and subcortical white matter.  There are no new regions of signal abnormality in the brain.  There is no restricted diffusion or pathologic enhancement.  There are no findings to suggest progression of demyelinating disease.  2. There is no hemorrhage, mass effect, acute infarction or abnormal enhancement elsewhere in the brain.      Electronically signed by: Osmani Grullon MD  Date:    11/13/2018  Time:    13:28     Results for orders placed during the hospital encounter of 12/02/19   MRI Cervical Spine Demyelinating W W/O Contrast    Impression 1.  No significant change in the appearance of the cervical spine or cervical cord as compared to prior MRI dated 11/13/2018.  Persistent patchy areas of abnormal T2/STIR hyperintense signal within the cervical cord, as detailed above, compatible with lesions of demyelination.  No definite new cervical cord signal abnormality or postcontrast enhancement to indicate active disease.  2. Mild degenerative changes of the cervical spine without evidence of high-grade spinal canal or neural foraminal stenosis, not significantly changed as compared to the prior study.      Electronically signed by: Richard Garcia  Date:    12/16/2019  Time:    09:37     No results found for this or any previous visit.      Labs:     Lab Results   Component Value Date    ZKZKHHUD10QN 62 10/14/2019    IFJMJHLK22YI 59 04/30/2019    BLCRYHOJ58TH 64 03/08/2018     No results found for: JCVINDEX, JCVANTIBODY  No results found for: BP3ULFZX, ABSOLUTECD3, TC3SDKBB, ABSOLUTECD8, SB8KNOIT, ABSOLUTECD4, LABCD48  Lab Results   Component Value Date    WBC 8.98 03/11/2020    RBC 4.87 03/11/2020    HGB 14.4 03/11/2020    HCT 43.5 03/11/2020    MCV 89 03/11/2020    MCH 29.6 03/11/2020    MCHC 33.1 03/11/2020    RDW 11.6 03/11/2020     03/11/2020    MPV 10.7 03/11/2020    GRAN 8.0 (H) 03/11/2020    GRAN 88.8 (H) 03/11/2020    LYMPH 0.6 (L) 03/11/2020    LYMPH 7.0 (L) 03/11/2020    MONO 0.3 03/11/2020    MONO 3.8 (L) 03/11/2020    EOS 0.0 03/11/2020    BASO 0.01 03/11/2020    EOSINOPHIL 0.0 03/11/2020    BASOPHIL 0.1 03/11/2020     Sodium   Date Value Ref Range Status   03/11/2020 136 136 - 145 mmol/L Final     Potassium   Date Value Ref Range Status   03/11/2020 4.1 3.5 - 5.1 mmol/L Final     Chloride   Date Value Ref Range Status   03/11/2020 106 95 - 110 mmol/L Final     CO2   Date Value Ref Range Status   03/11/2020 18 (L) 23 - 29 mmol/L Final     Glucose   Date Value Ref Range Status   03/11/2020 188 (H) 70 - 110 mg/dL Final     BUN, Bld   Date Value  Ref Range Status   03/11/2020 15 8 - 23 mg/dL Final     Creatinine   Date Value Ref Range Status   03/11/2020 1.0 0.5 - 1.4 mg/dL Final     Calcium   Date Value Ref Range Status   03/11/2020 9.5 8.7 - 10.5 mg/dL Final     Total Protein   Date Value Ref Range Status   03/11/2020 7.1 6.0 - 8.4 g/dL Final     Albumin   Date Value Ref Range Status   03/11/2020 3.7 3.5 - 5.2 g/dL Final     Total Bilirubin   Date Value Ref Range Status   03/11/2020 0.7 0.1 - 1.0 mg/dL Final     Comment:     For infants and newborns, interpretation of results should be based  on gestational age, weight and in agreement with clinical  observations.  Premature Infant recommended reference ranges:  Up to 24 hours.............<8.0 mg/dL  Up to 48 hours............<12.0 mg/dL  3-5 days..................<15.0 mg/dL  6-29 days.................<15.0 mg/dL       Alkaline Phosphatase   Date Value Ref Range Status   03/11/2020 70 55 - 135 U/L Final     AST   Date Value Ref Range Status   03/11/2020 15 10 - 40 U/L Final     ALT   Date Value Ref Range Status   03/11/2020 15 10 - 44 U/L Final     Anion Gap   Date Value Ref Range Status   03/11/2020 12 8 - 16 mmol/L Final     eGFR if    Date Value Ref Range Status   03/11/2020 >60 >60 mL/min/1.73 m^2 Final     eGFR if non    Date Value Ref Range Status   03/11/2020 58 (A) >60 mL/min/1.73 m^2 Final     Comment:     Calculation used to obtain the estimated glomerular filtration  rate (eGFR) is the CKD-EPI equation.        No results found for: HEPBSAG, HEPBSAB, HEPBCAB        MS Impression and Plan:     NEURO MULTIPLE SCLEROSIS IMPRESSION:   MS Status:     Number of relapses in the past year?:  0    Clinical Progression:  Clinically Stable    MRI Progression:  Worsened (new lesions)  Plan:     DMT:  No change in management    DMT comment:  Pt did form one new lesion on MRI last year.  She is NAB negative; She was recently diagnosed with DM, and I'm inclined to think this  condition may underlie this brief worsening of MS.  We'll continue Avonex for now, but keep a close eye on her clinically and with MRI.     Symptom Management:  No change in symptom management     Next Imaging Due: 11/20/2020     Next Labs Due: 9/20/2020     F/u Fay Snyder PA-C in 4 mo          Our visit today lasted 30 minutes, and 100% of this time was spent face to face with the patient. Over 50% of this visit included discussion of the treatment plan/medication changes/symptom management/exam findings/imaging results/coordination of care. The patient agrees with the plan of care.    Problem List Items Addressed This Visit        1 - High    MS (multiple sclerosis) - Primary    Relevant Orders    CBC auto differential    Hepatic function panel       2     Type 2 diabetes mellitus without complication, without long-term current use of insulin       Unprioritized    Gait disturbance      Other Visit Diagnoses     Prophylactic immunotherapy        Counseling regarding goals of care              Martha Roth MD

## 2020-04-21 ENCOUNTER — PATIENT MESSAGE (OUTPATIENT)
Dept: HEMATOLOGY/ONCOLOGY | Facility: CLINIC | Age: 70
End: 2020-04-21

## 2020-04-21 ENCOUNTER — TELEPHONE (OUTPATIENT)
Dept: NEUROLOGY | Facility: CLINIC | Age: 70
End: 2020-04-21

## 2020-04-21 NOTE — TELEPHONE ENCOUNTER
----- Message from Martha Roth MD sent at 4/20/2020  9:36 AM CDT -----  1. Labs in September [facility];   2. F/u KK in 4 mo

## 2020-04-22 ENCOUNTER — TELEPHONE (OUTPATIENT)
Dept: HEMATOLOGY/ONCOLOGY | Facility: CLINIC | Age: 70
End: 2020-04-22

## 2020-04-22 ENCOUNTER — LAB VISIT (OUTPATIENT)
Dept: LAB | Facility: HOSPITAL | Age: 70
End: 2020-04-22
Attending: INTERNAL MEDICINE
Payer: MEDICARE

## 2020-04-22 DIAGNOSIS — Z79.01 ANTICOAGULATED: ICD-10-CM

## 2020-04-22 LAB
ALBUMIN SERPL BCP-MCNC: 4 G/DL (ref 3.5–5.2)
ALP SERPL-CCNC: 73 U/L (ref 55–135)
ALT SERPL W/O P-5'-P-CCNC: 18 U/L (ref 10–44)
ANION GAP SERPL CALC-SCNC: 8 MMOL/L (ref 8–16)
APTT BLDCRRT: 29.2 SEC (ref 21–32)
AST SERPL-CCNC: 15 U/L (ref 10–40)
BASOPHILS # BLD AUTO: 0.04 K/UL (ref 0–0.2)
BASOPHILS NFR BLD: 0.6 % (ref 0–1.9)
BILIRUB SERPL-MCNC: 0.7 MG/DL (ref 0.1–1)
BUN SERPL-MCNC: 24 MG/DL (ref 8–23)
CALCIUM SERPL-MCNC: 9.4 MG/DL (ref 8.7–10.5)
CHLORIDE SERPL-SCNC: 108 MMOL/L (ref 95–110)
CO2 SERPL-SCNC: 25 MMOL/L (ref 23–29)
CREAT SERPL-MCNC: 1.1 MG/DL (ref 0.5–1.4)
DIFFERENTIAL METHOD: NORMAL
EOSINOPHIL # BLD AUTO: 0.1 K/UL (ref 0–0.5)
EOSINOPHIL NFR BLD: 0.9 % (ref 0–8)
ERYTHROCYTE [DISTWIDTH] IN BLOOD BY AUTOMATED COUNT: 12.4 % (ref 11.5–14.5)
EST. GFR  (AFRICAN AMERICAN): 60 ML/MIN/1.73 M^2
EST. GFR  (NON AFRICAN AMERICAN): 52 ML/MIN/1.73 M^2
FACT X PPP CHRO-ACNC: >1.5 IU/ML (ref 0.3–0.7)
GLUCOSE SERPL-MCNC: 98 MG/DL (ref 70–110)
HCT VFR BLD AUTO: 44.1 % (ref 37–48.5)
HGB BLD-MCNC: 14.1 G/DL (ref 12–16)
IMM GRANULOCYTES # BLD AUTO: 0.02 K/UL (ref 0–0.04)
IMM GRANULOCYTES NFR BLD AUTO: 0.3 % (ref 0–0.5)
INR PPP: 1 (ref 0.8–1.2)
LYMPHOCYTES # BLD AUTO: 1.5 K/UL (ref 1–4.8)
LYMPHOCYTES NFR BLD: 22.4 % (ref 18–48)
MCH RBC QN AUTO: 29.4 PG (ref 27–31)
MCHC RBC AUTO-ENTMCNC: 32 G/DL (ref 32–36)
MCV RBC AUTO: 92 FL (ref 82–98)
MONOCYTES # BLD AUTO: 0.6 K/UL (ref 0.3–1)
MONOCYTES NFR BLD: 9.3 % (ref 4–15)
NEUTROPHILS # BLD AUTO: 4.5 K/UL (ref 1.8–7.7)
NEUTROPHILS NFR BLD: 66.5 % (ref 38–73)
NRBC BLD-RTO: 0 /100 WBC
PLATELET # BLD AUTO: 201 K/UL (ref 150–350)
PMV BLD AUTO: 10.8 FL (ref 9.2–12.9)
POTASSIUM SERPL-SCNC: 3.6 MMOL/L (ref 3.5–5.1)
PROT SERPL-MCNC: 7 G/DL (ref 6–8.4)
PROTHROMBIN TIME: 10.9 SEC (ref 9–12.5)
RBC # BLD AUTO: 4.8 M/UL (ref 4–5.4)
SODIUM SERPL-SCNC: 141 MMOL/L (ref 136–145)
WBC # BLD AUTO: 6.69 K/UL (ref 3.9–12.7)

## 2020-04-22 PROCEDURE — 85520 HEPARIN ASSAY: CPT

## 2020-04-22 PROCEDURE — 36415 COLL VENOUS BLD VENIPUNCTURE: CPT

## 2020-04-22 PROCEDURE — 80053 COMPREHEN METABOLIC PANEL: CPT

## 2020-04-22 PROCEDURE — 85025 COMPLETE CBC W/AUTO DIFF WBC: CPT

## 2020-04-22 PROCEDURE — 85730 THROMBOPLASTIN TIME PARTIAL: CPT

## 2020-04-22 PROCEDURE — 85610 PROTHROMBIN TIME: CPT

## 2020-04-22 NOTE — TELEPHONE ENCOUNTER
Today's lab results faxed to Dr. Hernandez's office with fax confirmation received, per patient's request.      ----- Message from Marley Henry RN sent at 4/21/2020 12:31 PM CDT -----  Regarding: fax CBC results to Dr. Ayers  Fax CBC results to Dr. Ayers's office once resulted

## 2020-04-23 ENCOUNTER — PATIENT MESSAGE (OUTPATIENT)
Dept: OTHER | Facility: OTHER | Age: 70
End: 2020-04-23

## 2020-04-23 ENCOUNTER — OFFICE VISIT (OUTPATIENT)
Dept: HEMATOLOGY/ONCOLOGY | Facility: CLINIC | Age: 70
End: 2020-04-23
Payer: MEDICARE

## 2020-04-23 ENCOUNTER — TELEPHONE (OUTPATIENT)
Dept: HEMATOLOGY/ONCOLOGY | Facility: CLINIC | Age: 70
End: 2020-04-23

## 2020-04-23 DIAGNOSIS — E11.9 TYPE 2 DIABETES MELLITUS WITHOUT COMPLICATION, WITHOUT LONG-TERM CURRENT USE OF INSULIN: ICD-10-CM

## 2020-04-23 DIAGNOSIS — Z79.01 ANTICOAGULATED: ICD-10-CM

## 2020-04-23 DIAGNOSIS — R06.02 SOB (SHORTNESS OF BREATH): ICD-10-CM

## 2020-04-23 DIAGNOSIS — E66.01 MORBID OBESITY WITH BMI OF 40.0-44.9, ADULT: ICD-10-CM

## 2020-04-23 DIAGNOSIS — I26.99 BILATERAL PULMONARY EMBOLISM: Primary | ICD-10-CM

## 2020-04-23 DIAGNOSIS — N93.9 UTERINE BLEEDING: ICD-10-CM

## 2020-04-23 PROCEDURE — 99215 OFFICE O/P EST HI 40 MIN: CPT | Mod: 95,,, | Performed by: INTERNAL MEDICINE

## 2020-04-23 PROCEDURE — 99215 PR OFFICE/OUTPT VISIT, EST, LEVL V, 40-54 MIN: ICD-10-PCS | Mod: 95,,, | Performed by: INTERNAL MEDICINE

## 2020-04-23 NOTE — TELEPHONE ENCOUNTER
Pt scheduled for stat CT tomorrow 4/24 as requested by Dr. Pinto. Pt confirmed apt date/time scheduled and verbalized understanding.

## 2020-04-23 NOTE — PROGRESS NOTES
The patient location is:  At home  The chief complaint leading to consultation is:  I had a blood clot   Patient has consented to this visit via televisit  Visit type: Virtual visit with synchronous audio and video plus interrupted audio   Total time spent with patient: over 50 min with more than 90% on medical discussion, counseling and coordination of care.  Each patient to whom he or she provides medical services by telemedicine is:  (1) informed of the relationship between the physician and patient and the respective role of any other health care provider with respect to management of the patient; and (2) notified that he or she may decline to receive medical services by telemedicine and may withdraw from such care at any time.    This visit is to also involve counseling, patient education, informed consent for ordered diagnostic and laboratory tests and motivational interviewing      CC STarted  having vaginal bleeding few weeks ago    Naomi Toledo is a 70 y.o.     This is a 70-year-old female who presented to the emergency room on March 10 with increasing shortness of breath and chest pain.  CT of chest was performed which revealed bilateral pulmonary emboli with moderate clot burden.  Of note was an incidental finding of a 3 mm right upper lobe lung nodule which had been there prior and was noted as stable.  Patient had been on Megace for weight loss associated with multiple sclerosis as well as interferon beta.  She currently is tolerating Eliquis 5 mg p.o. b.i.d. without any complications and no evidence of bleeding.  Ultrasound of her legs were performed revealing no evidence of thromboembolism  She was on megace for gyn purposes.     Imaging March 10, 2020 CT of chest revealed  Chart and records reviewed    Impression       1. Bilateral pulmonary emboli with moderate clot burden.  2. Trace left pleural effusion and basilar airspace disease suggesting atelectasis.      Electronically signed by: Papo  Lee  Date: 03/10/2020  Time: 07:56       April 7   Pt has been checking her BP  Running 112/63 , she had one episode 95/55; She is not on any medicine for hypertension  She reports she has completely changed her diet after being labeled with diabetes she is following the [place] diabetic diet  She has lost 33 pounds in the last 3 pounds   Weight is 242 pounds     April 23, 2020  Patient experiencing new onset of what she calls helping and puffing.  She is having some shortness of breath at rest.  She has been having dysfunctional uterine bleeding since taking Eliquis 5 mg p.o. b.i.d..  She started this medicine in March and started having bleeding approximately 1-2 weeks ago.  She has seen her gyn.  Labs have been ordered to reassess her hemoglobin.  She is wearing a pad and states she feels that it is uterine bleeding as it is not increased when she urinates.  Today I will review her anticoagulant markers try to make some adjustments.  She will be seeing her gyn physician again and discussing possible ablation    Past Medical History:   Diagnosis Date    Blood transfusion     Deep vein thrombosis     MS (multiple sclerosis)     Plantar fasciitis of left foot 2/2014     Past Surgical History:   Procedure Laterality Date    OVARIAN CYST SURGERY  1974    TUBAL LIGATION      VEIN SURGERY         Current Outpatient Medications:     apixaban (ELIQUIS) 5 mg Tab, Take 1 tablet (5 mg total) by mouth 2 (two) times daily., Disp: 60 tablet, Rfl: 1    AVONEX 30 mcg/0.5 mL PnKt, INJECT 0.5 ML (30 MCG) INTO THE MUSCLE EVERY 7 DAYS (Patient taking differently: INJECT 0.5 ML (30 MCG) INTO THE MUSCLE EVERY 7 DAYS done on sundays), Disp: 12 pen, Rfl: 1    blood sugar diagnostic Strp, 1 x day testing, Disp: 100 strip, Rfl: 3    blood-glucose meter (FREESTYLE FREEDOM) kit, Use as instructed, Disp: 1 each, Rfl: 0    blood-glucose meter kit, Use as instructed, Disp: 1 each, Rfl: 0    cholecalciferol, vitamin D3, (VITAMIN  D3) 5,000 unit Tab, Take 5,000 Units by mouth once daily., Disp: , Rfl:     lancets (ACCU-CHEK SOFTCLIX LANCETS) Misc, Test 1 x day, Disp: 100 each, Rfl: 3    metFORMIN (GLUCOPHAGE-XR) 500 MG 24 hr tablet, Take 2 tablets (1,000 mg total) by mouth daily with breakfast., Disp: 180 tablet, Rfl: 3    minocycline (MINOCIN,DYNACIN) 50 MG Cap, , Disp: , Rfl:     RHOFADE 1 % Crea, , Disp: , Rfl:   Review of patient's allergies indicates:   Allergen Reactions    Morphine Itching     Social History     Tobacco Use    Smoking status: Never Smoker    Smokeless tobacco: Never Used   Substance Use Topics    Alcohol use: No     Frequency: Never    Drug use: No     Family History   Problem Relation Age of Onset    Cancer Mother         breast    Breast cancer Mother 50    Hypertension Father     Cancer Sister     Hypertension Sister     Breast cancer Sister 64    Cancer Brother     Diabetes Maternal Grandmother     Leukemia Paternal Grandmother        CONSTITUTIONAL: No fevers, chills, night sweats, wt. loss, appetite changes  SKIN: no rashes or itching  ENT: No headaches, head trauma, vision changes, or eye pain  LYMPH NODES: None noticed   CV: No chest pain, palpitations.   RESP: No dyspnea on exertion, cough, wheezing, or hemoptysis  GI: No nausea, emesis, diarrhea, constipation, melena, hematochezia, pain.   : No dysuria, hematuria, urgency, or frequency   HEME:  POSITIVE easy bruising, bleeding problems  PSYCHIATRIC: No depression, anxiety, psychosis, hallucinations.  NEURO: No seizures, memory loss, dizziness or difficulty sleeping  MSK: No arthralgias or joint swelling      General patient is in no distress well developed well nourished  Psych pleasant affect no evidence of depression no evidence of anxiety  Head normocephalic atraumatic, no hoarseness,  well-spoken speech intact  Eyes noninjected sclera conjunctiva appear pink  Face is symmetric  Skin intact no lesions noted no ecchymosis no rash  Neck  with no limited range of motion no JVD evident  Chest with no use of accessory muscles no labored breathing no evidence of tachypnea  No respiratory distress, effort normal   Abdomen appears to be nondistended  Extremities with no cyanosis no evidence of edema  Neuro muscular cranial nerves appear grossly intact  Gait appears steady  No joint effusions  Behavior normal judgment normal      Lab Results   Component Value Date    WBC 8.98 03/11/2020    HGB 14.4 03/11/2020    HCT 43.5 03/11/2020    MCV 89 03/11/2020     03/11/2020     Lab Results   Component Value Date    WBC 6.69 04/22/2020    HGB 14.1 04/22/2020    HCT 44.1 04/22/2020    MCV 92 04/22/2020     04/22/2020           CMP  Sodium   Date Value Ref Range Status   04/22/2020 141 136 - 145 mmol/L Final     Potassium   Date Value Ref Range Status   04/22/2020 3.6 3.5 - 5.1 mmol/L Final     Chloride   Date Value Ref Range Status   04/22/2020 108 95 - 110 mmol/L Final     CO2   Date Value Ref Range Status   04/22/2020 25 23 - 29 mmol/L Final     Glucose   Date Value Ref Range Status   04/22/2020 98 70 - 110 mg/dL Final     BUN, Bld   Date Value Ref Range Status   04/22/2020 24 (H) 8 - 23 mg/dL Final     Creatinine   Date Value Ref Range Status   04/22/2020 1.1 0.5 - 1.4 mg/dL Final     Calcium   Date Value Ref Range Status   04/22/2020 9.4 8.7 - 10.5 mg/dL Final     Total Protein   Date Value Ref Range Status   04/22/2020 7.0 6.0 - 8.4 g/dL Final     Albumin   Date Value Ref Range Status   04/22/2020 4.0 3.5 - 5.2 g/dL Final     Total Bilirubin   Date Value Ref Range Status   04/22/2020 0.7 0.1 - 1.0 mg/dL Final     Comment:     For infants and newborns, interpretation of results should be based  on gestational age, weight and in agreement with clinical  observations.  Premature Infant recommended reference ranges:  Up to 24 hours.............<8.0 mg/dL  Up to 48 hours............<12.0 mg/dL  3-5 days..................<15.0 mg/dL  6-29  days.................<15.0 mg/dL       Alkaline Phosphatase   Date Value Ref Range Status   04/22/2020 73 55 - 135 U/L Final     AST   Date Value Ref Range Status   04/22/2020 15 10 - 40 U/L Final     ALT   Date Value Ref Range Status   04/22/2020 18 10 - 44 U/L Final     Anion Gap   Date Value Ref Range Status   04/22/2020 8 8 - 16 mmol/L Final     eGFR if    Date Value Ref Range Status   04/22/2020 60 >60 mL/min/1.73 m^2 Final     eGFR if non    Date Value Ref Range Status   04/22/2020 52 (A) >60 mL/min/1.73 m^2 Final     Comment:     Calculation used to obtain the estimated glomerular filtration  rate (eGFR) is the CKD-EPI equation.          Bilateral pulmonary embolism  -     CTA CHEST; Future; Expected date: 04/23/2020  -     apixaban (ELIQUIS) 2.5 mg Tab; Take 1 tablet (2.5 mg total) by mouth 2 (two) times daily.  Dispense: 60 tablet; Refill: 0    Anticoagulated on eliquis  -     CTA CHEST; Future; Expected date: 04/23/2020  -     apixaban (ELIQUIS) 2.5 mg Tab; Take 1 tablet (2.5 mg total) by mouth 2 (two) times daily.  Dispense: 60 tablet; Refill: 0    Uterine bleeding  -     CTA CHEST; Future; Expected date: 04/23/2020  -     apixaban (ELIQUIS) 2.5 mg Tab; Take 1 tablet (2.5 mg total) by mouth 2 (two) times daily.  Dispense: 60 tablet; Refill: 0    SOB (shortness of breath)  -     CTA CHEST; Future; Expected date: 04/23/2020  -     apixaban (ELIQUIS) 2.5 mg Tab; Take 1 tablet (2.5 mg total) by mouth 2 (two) times daily.  Dispense: 60 tablet; Refill: 0         I was going to hold Eliquis for couple of days however patient is having new onset of shortness of breath at rest.  I am sending her for a stat CTA of chest to help they can perform this test today although she is on metformin.  If not the patient states she had make a tomorrow morning.  This will help determine if I can hold Eliquis for a couple of days.  In any event she is overly anticoagulated as evidence by her labs  and I am decreasing her Eliquis to 2.5 mg p.o. b.i.d. day from 5 point 0  Patient must stop the use of Megace as this can increase the risk of thromboembolism  She needs close surveillance for secondary malignancy such as breast cancer while taking interferon beta: mammogram negative September 2019    I will call her with results of her CTA make further recs accordingly  Dr Hernandez is her gyn   Appreciate gyn;s help    Thank you for allowing me to evaluate and participate in the care of this pleasant patient. Please fell free to call me with any questions or concerns.    Meg Rockwell MD    This note was dictated with Dragon and briefly proofread. Please excuse any sentences that may be unclear or words misspelled

## 2020-04-23 NOTE — PROGRESS NOTES
Digital Medicine: Health  Introduction    Introduced Naomi Toledo to Digital Medicine. Discussed health  role and recommended lifestyle modifications.    Enrolled Mrs. Toledo into Diabetes Digital Medicine program. Introduced myself as pt's health  for the program. Discussed program goals. Pt states she consumes 30 g CHO for meals, 7-15 g for snacks ( ~90 g CHO daily total). She counts CHO by using an kayla. Pt states she walks 3-4 miles daily for physical activity. Gave praise, encouragement, and support for pt to continue with progress. Encouraged pt to reach out to me with future questions or concerns.     The history is provided by the patient.     DIABETES    Our goal is to decrease A1c within patient-specific target levels and make the process convenient so patient can avoid extra trips to the office. Reducing A1C by merely 1% results in a decreased risk of complications of at least 10%. For example, an A1C reduced from 8.5% to 7.5% results in almost 40% lower risk of kidney, eye, and nerve disease      Reviewed that the Digital Medicine care team - consisting of a clinician and a health  - will follow the most current evidence-based national guidelines for treating your condition.  The health  will focus on lifestyle modifications and motivation while the clinician will focus on medication therapy.  The care team will review all data on a regular basis and reach out as needed.      Explained that one of the key parts of the program is communication with the care team.  Asked patient to respond to outreach attempts and complete questionnaires.  Stressed importance of medication adherence.      Instructed patient not to allow anyone else to use phone and monitoring device.  Explained that we expect patient to test their blood sugar as prescribed by their physician or Digital Medicine Clinician.      Reviewed general Self-Monitoring of Blood Glucose (SMBG) goals:  · FPG: <  mg/dL  · 1h  PPG: < 180 mg/dL  · 2h PPG: < 160 mg/dL  · Bedtime: < 150 mg/dL    Patient's A1C goal is less than or equal to 7.  Patient's most recent A1C result is at or below goal.  Lab Results     Component                Value               Date                     HGBA1C                   6.7 (H)             01/16/2020                      Last 6 Patient Entered Readings                                          Most Recent A1c: 6.7% on 1/16/2020  (Goal: 7%)     Recent Readings 4/19/2020 4/18/2020    Blood Glucose (mg/dL) 99 134          INTERVENTION(S)  recommended diet modifications, recommend physical activity, encouragement/support and goal setting    PLAN  patient verbalizes understanding, patient amenable to changes and continue monitoring          Topic    Urine Protein Check     Eye Exam        Reviewed the importance of self-monitoring, medication adherence, and that the health  can be used as a resource for lifestyle modifications to help reduce or maintain a healthy lifestyle.    Sent link to [facility] webpages and my contact information via [employer] for future questions. Follow up scheduled.             Diet Screening   She has the following dietary restrictions: weight-loss and diabetic    Intervention(s): carb reduction    Physical Activity Screening   When asked if exercising, patient responded: yes    Patient participates in the following activities: walking    Pt states she walks 3-4 miles daily for physical activity.    Intervention(s): goal setting and goal tracking       SDOH

## 2020-04-24 ENCOUNTER — HOSPITAL ENCOUNTER (OUTPATIENT)
Dept: RADIOLOGY | Facility: HOSPITAL | Age: 70
Discharge: HOME | End: 2020-04-24
Attending: INTERNAL MEDICINE
Payer: MEDICARE

## 2020-04-24 DIAGNOSIS — Z79.01 ANTICOAGULATED: ICD-10-CM

## 2020-04-24 DIAGNOSIS — I26.99 BILATERAL PULMONARY EMBOLISM: ICD-10-CM

## 2020-04-24 DIAGNOSIS — N93.9 UTERINE BLEEDING: ICD-10-CM

## 2020-04-24 DIAGNOSIS — R06.02 SOB (SHORTNESS OF BREATH): ICD-10-CM

## 2020-04-24 PROCEDURE — 25500020 PHARM REV CODE 255

## 2020-04-24 PROCEDURE — 71275 CT ANGIOGRAPHY CHEST: CPT | Mod: TC

## 2020-04-24 PROCEDURE — 71275 CT ANGIOGRAPHY CHEST: CPT | Mod: 26,,, | Performed by: RADIOLOGY

## 2020-04-24 PROCEDURE — 71275 CTA CHEST NON CORONARY: ICD-10-PCS | Mod: 26,,, | Performed by: RADIOLOGY

## 2020-04-24 RX ADMIN — IOHEXOL 100 ML: 350 INJECTION, SOLUTION INTRAVENOUS at 09:04

## 2020-04-27 ENCOUNTER — PATIENT MESSAGE (OUTPATIENT)
Dept: HEMATOLOGY/ONCOLOGY | Facility: CLINIC | Age: 70
End: 2020-04-27

## 2020-04-28 ENCOUNTER — PATIENT MESSAGE (OUTPATIENT)
Dept: HEMATOLOGY/ONCOLOGY | Facility: CLINIC | Age: 70
End: 2020-04-28

## 2020-04-29 ENCOUNTER — PATIENT MESSAGE (OUTPATIENT)
Dept: FAMILY MEDICINE | Facility: CLINIC | Age: 70
End: 2020-04-29

## 2020-04-29 ENCOUNTER — TELEPHONE (OUTPATIENT)
Dept: FAMILY MEDICINE | Facility: CLINIC | Age: 70
End: 2020-04-29

## 2020-04-29 NOTE — TELEPHONE ENCOUNTER
I spoke to Lizzie at Dr. Hernandez's office and faxed over patient's records from pelvic exam and labs.

## 2020-04-29 NOTE — TELEPHONE ENCOUNTER
----- Message from Taylor Barnett sent at 4/29/2020 11:36 AM CDT -----  Contact: Lizzie from Dr. Hernandez [phone]  Lizzie called from Dr. Hernandez office yesterday to find the results from the Pap test the patient had at your office this past January.  Call back [phone]

## 2020-04-30 ENCOUNTER — PATIENT OUTREACH (OUTPATIENT)
Dept: OTHER | Facility: OTHER | Age: 70
End: 2020-04-30

## 2020-04-30 NOTE — PROGRESS NOTES
"Digital Medicine: Clinician Introduction    Naomi Toledo is a 70 y.o. female who is newly enrolled in the Digital Medicine Clinic.    The following information was reviewed and updated:  Preferred pharmacy   [facility] - [place], [place] - [address] [facility]  [address] [facility]  [place] [address]  Phone: [phone] Fax: [phone]    [facility] - [place], [place] - [address] [facility]  [address] [facility] [address]  Phone: [phone] Fax: [phone]    [id number] - [place], [place] - [address] [facility] [address]  [address] [facility]  [place] [address]  Phone: [phone] Fax: [phone]    [facility] - [place], [place] - [address] [place] [address]  [address] [facility] [address]  Phone: [phone] Fax: [phone]        Review of patient's allergies indicates:   Allergen Reactions    Morphine Itching       Called patient to introduce myself and welcome her to the Diabetes Digital Medicine program. Patient is using Freestyle meter and logging in her reading. Since January, she has lost 40# since January, due to "changing diet 100% and walking 2-3 miles/day."     When asked about glucoses of 55, 67, 58, 65, she states that those occurred when she was learning how to use her glucometer. She did not experience symptoms. This has not reoccurred.     The history is provided by the patient.     DIABETES  Instructed patient not to allow anyone else to use phone and monitoring device.  Explained that we expect patient to test their blood sugar as prescribed by their physician or Digital Medicine Clinician.      Reviewed general Self-Monitoring of Blood Glucose (SMBG) goals:  · FPG: <  mg/dL  · 1h PPG: < 180 mg/dL  · 2h PPG: < 160 mg/dL  · Bedtime: < 150 mg/dL    Explained to patient that the digital medicine team is not available for emergencies.  Patient will call " [facility] on-call ([phone] or [phone]) or 911 if needed.      Expected SMBG schedule: Daily  Advised patient to alternate timing of her daily fingersticks.     Patient has history of hypoglycemia.    Patient initially had 4 readings in 50-60s range when se began using her meter. She didn't experience symptoms and this has not reoccurred.     Reviewed signs and symptoms of hypoglycemia (weakness, dizziness, hunger, shakiness, nausea, headache, heart palpitations, sweating, fatigue, anxiety, etc.).  Reviewed treatment of hypoglycemia (15/15 rule).      Patient is not on ace inhibitor or angiotensin II receptor blocker because   Patient is not on statin.     Patient's A1C goals is less than or equal to 7. Patient's most recent A1C result is at or below goal. Lab Results     Component                Value               Date                     HGBA1C                   6.7 (H)             01/16/2020             Med Review complete.    Allergies reviewed.          Last 6 Patient Entered Readings                                          Most Recent A1c: 6.7% on 1/16/2020  (Goal: 7%)     Recent Readings 4/19/2020 4/18/2020    Blood Glucose (mg/dL) 99 134          INTERVENTION(S)  reviewed appropriate dose schedule, reviewed monitoring technique and encouragement/support    PLAN  patient verbalizes understanding and continue monitoring    Continue metformin XR 1000 mg daily.   While metformin is not a typical cause of hypoglycemia, patient was advised to notify us if this occurs.   I advised pt to confirm an unusually low or high reading with another fingerstick.          Topic    Urine Protein Check     Eye Exam     Lipid (Cholesterol) Test        Current Medication Regimen:    Diabetes Medications             metFORMIN (GLUCOPHAGE-XR) 500 MG 24 hr tablet Take 2 tablets (1,000 mg total) by mouth daily with breakfast.          Reviewed the importance of self-monitoring, medication adherence, and that the health   can be used as a resource for lifestyle modifications to help reduce or maintain a healthy lifestyle.    Sent link to [facility] [employer] webpages and my contact information via [employer] for future questions. Follow up scheduled.             Sleep Apnea Screening  Patient not previously diagnosed with STEPHANIE and     Medication Affordability Screening  Patient is currently not having problems affording medications    Medication Adherence Screening   She did not miss a dose this month.  Patient knows purpose of medications.

## 2020-05-01 ENCOUNTER — OFFICE VISIT (OUTPATIENT)
Dept: HEMATOLOGY/ONCOLOGY | Facility: CLINIC | Age: 70
End: 2020-05-01
Payer: MEDICARE

## 2020-05-01 DIAGNOSIS — E66.01 MORBID OBESITY WITH BMI OF 40.0-44.9, ADULT: ICD-10-CM

## 2020-05-01 DIAGNOSIS — I26.99 BILATERAL PULMONARY EMBOLISM: ICD-10-CM

## 2020-05-01 DIAGNOSIS — R76.8 POSITIVE ANA (ANTINUCLEAR ANTIBODY): Primary | ICD-10-CM

## 2020-05-01 DIAGNOSIS — Z79.01 ANTICOAGULATED: ICD-10-CM

## 2020-05-01 PROCEDURE — 99214 PR OFFICE/OUTPT VISIT, EST, LEVL IV, 30-39 MIN: ICD-10-PCS | Mod: 95,,, | Performed by: INTERNAL MEDICINE

## 2020-05-01 PROCEDURE — 99214 OFFICE O/P EST MOD 30 MIN: CPT | Mod: 95,,, | Performed by: INTERNAL MEDICINE

## 2020-05-01 NOTE — PROGRESS NOTES
The patient location is:  At home  The chief complaint leading to consultation is:  I had a blood clot   Patient has consented to this visit via televisit  Visit type: Virtual visit with synchronous audio and video plus interrupted audio   Total time spent with patient: over 50 min with more than 90% on medical discussion, counseling and coordination of care.  Each patient to whom he or she provides medical services by telemedicine is:  (1) informed of the relationship between the physician and patient and the respective role of any other health care provider with respect to management of the patient; and (2) notified that he or she may decline to receive medical services by telemedicine and may withdraw from such care at any time.    This visit is to also involve counseling, patient education, informed consent for ordered diagnostic and laboratory tests and motivational interviewing      CC  I still have slight vaginal spotting     Naomi Toledo is a 70 y.o.     This is a 70-year-old female who presented to the emergency room on March 10 with increasing shortness of breath and chest pain.  CT of chest was performed which revealed bilateral pulmonary emboli with moderate clot burden.  Of note was an incidental finding of a 3 mm right upper lobe lung nodule which had been there prior and was noted as stable.  Patient had been on Megace for weight loss associated with multiple sclerosis as well as interferon beta.  She currently is tolerating Eliquis 5 mg p.o. b.i.d. without any complications and no evidence of bleeding.  Ultrasound of her legs were performed revealing no evidence of thromboembolism  She was on megace for gyn purposes.     Imaging March 10, 2020 CT of chest revealed  Chart and records reviewed    Impression       1. Bilateral pulmonary emboli with moderate clot burden.  2. Trace left pleural effusion and basilar airspace disease suggesting atelectasis.      Electronically signed by: Papo  Lee  Date: 03/10/2020  Time: 07:56       April 7   Pt has been checking her BP  Running 112/63 , she had one episode 95/55; She is not on any medicine for hypertension  She reports she has completely changed her diet after being labeled with diabetes she is following the [address] diabetic diet  She has lost 33 pounds in the last 3 pounds   Weight is 242 pounds     April 23, 2020  Patient experiencing new onset of what she calls helping and puffing.  She is having some shortness of breath at rest.  She has been having dysfunctional uterine bleeding since taking Eliquis 5 mg p.o. b.i.d..  She started this medicine in March and started having bleeding approximately 1-2 weeks ago.  She has seen her gyn.  Labs have been ordered to reassess her hemoglobin.  She is wearing a pad and states she feels that it is uterine bleeding as it is not increased when she urinates.  Today I will review her anticoagulant markers try to make some adjustments.  She will be seeing her gyn physician again and discussing possible ablation    May 1, 2020 patient remains with slight vaginal spotting.  She has decreased her Eliquis from 5 mg p.o. b.i.d. to 2.5 mg b.i.d. as we discussed last office visit.  I appreciate the help of her gynecologist to his going to schedule her for a D&C in ablation.  Today will review her labs which revealing stable hemoglobin yet prolonged anti 10a as expected.  CT of chest reveals that the pulmonary emboli have resolved completely although she remains with some slight small sub pulmonary nodules    Past Medical History:   Diagnosis Date    Blood transfusion     Deep vein thrombosis     MS (multiple sclerosis)     Plantar fasciitis of left foot 2/2014     Past Surgical History:   Procedure Laterality Date    OVARIAN CYST SURGERY  1974    TUBAL LIGATION      VEIN SURGERY         Current Outpatient Medications:     apixaban (ELIQUIS) 2.5 mg Tab, Take 1 tablet (2.5 mg total) by mouth 2 (two) times daily.,  Disp: 60 tablet, Rfl: 0    AVONEX 30 mcg/0.5 mL PnKt, INJECT 0.5 ML (30 MCG) INTO THE MUSCLE EVERY 7 DAYS (Patient taking differently: INJECT 0.5 ML (30 MCG) INTO THE MUSCLE EVERY 7 DAYS done on sundays), Disp: 12 pen, Rfl: 1    blood sugar diagnostic Strp, 1 x day testing, Disp: 100 strip, Rfl: 3    blood-glucose meter (FREESTYLE FREEDOM) kit, Use as instructed, Disp: 1 each, Rfl: 0    blood-glucose meter kit, Use as instructed, Disp: 1 each, Rfl: 0    cholecalciferol, vitamin D3, (VITAMIN D3) 5,000 unit Tab, Take 5,000 Units by mouth once daily., Disp: , Rfl:     lancets (ACCU-CHEK SOFTCLIX LANCETS) Misc, Test 1 x day, Disp: 100 each, Rfl: 3    metFORMIN (GLUCOPHAGE-XR) 500 MG 24 hr tablet, Take 2 tablets (1,000 mg total) by mouth daily with breakfast., Disp: 180 tablet, Rfl: 3    minocycline (MINOCIN,DYNACIN) 50 MG Cap, , Disp: , Rfl:     RHOFADE 1 % Crea, , Disp: , Rfl:   Review of patient's allergies indicates:   Allergen Reactions    Morphine Itching     Social History     Tobacco Use    Smoking status: Never Smoker    Smokeless tobacco: Never Used   Substance Use Topics    Alcohol use: No     Frequency: Never    Drug use: No     Family History   Problem Relation Age of Onset    Cancer Mother         breast    Breast cancer Mother 50    Hypertension Father     Cancer Sister     Hypertension Sister     Breast cancer Sister 64    Cancer Brother     Diabetes Maternal Grandmother     Leukemia Paternal Grandmother        CONSTITUTIONAL: No fevers, chills, night sweats, wt. loss, appetite changes  SKIN: no rashes or itching  ENT: No headaches, head trauma, vision changes, or eye pain  LYMPH NODES: None noticed   CV: No chest pain, palpitations.   RESP: No dyspnea on exertion, cough, wheezing, or hemoptysis  GI: No nausea, emesis, diarrhea, constipation, melena, hematochezia, pain.   : No dysuria, hematuria, urgency, or frequency   HEME:  POSITIVE easy bruising, bleeding  problems  PSYCHIATRIC: No depression, anxiety, psychosis, hallucinations.  NEURO: No seizures, memory loss, dizziness or difficulty sleeping  MSK: No arthralgias or joint swelling      General patient is in no distress well developed well nourished  Psych pleasant affect no evidence of depression no evidence of anxiety  Head normocephalic atraumatic, no hoarseness,  well-spoken speech intact  Eyes noninjected sclera conjunctiva appear pink  Face is symmetric  Skin intact no lesions noted no ecchymosis no rash  Neck with no limited range of motion no JVD evident  Chest with no use of accessory muscles no labored breathing no evidence of tachypnea  No respiratory distress, effort normal   Abdomen appears to be nondistended  Extremities with no cyanosis no evidence of edema  Neuro muscular cranial nerves appear grossly intact  Gait appears steady  No joint effusions  Behavior normal judgment normal      Lab Results   Component Value Date    WBC 8.98 03/11/2020    HGB 14.4 03/11/2020    HCT 43.5 03/11/2020    MCV 89 03/11/2020     03/11/2020     Lab Results   Component Value Date    WBC 6.69 04/22/2020    HGB 14.1 04/22/2020    HCT 44.1 04/22/2020    MCV 92 04/22/2020     04/22/2020           CMP  Sodium   Date Value Ref Range Status   04/22/2020 141 136 - 145 mmol/L Final     Potassium   Date Value Ref Range Status   04/22/2020 3.6 3.5 - 5.1 mmol/L Final     Chloride   Date Value Ref Range Status   04/22/2020 108 95 - 110 mmol/L Final     CO2   Date Value Ref Range Status   04/22/2020 25 23 - 29 mmol/L Final     Glucose   Date Value Ref Range Status   04/22/2020 98 70 - 110 mg/dL Final     BUN, Bld   Date Value Ref Range Status   04/22/2020 24 (H) 8 - 23 mg/dL Final     Creatinine   Date Value Ref Range Status   04/22/2020 1.1 0.5 - 1.4 mg/dL Final     Calcium   Date Value Ref Range Status   04/22/2020 9.4 8.7 - 10.5 mg/dL Final     Total Protein   Date Value Ref Range Status   04/22/2020 7.0 6.0 - 8.4  g/dL Final     Albumin   Date Value Ref Range Status   04/22/2020 4.0 3.5 - 5.2 g/dL Final     Total Bilirubin   Date Value Ref Range Status   04/22/2020 0.7 0.1 - 1.0 mg/dL Final     Comment:     For infants and newborns, interpretation of results should be based  on gestational age, weight and in agreement with clinical  observations.  Premature Infant recommended reference ranges:  Up to 24 hours.............<8.0 mg/dL  Up to 48 hours............<12.0 mg/dL  3-5 days..................<15.0 mg/dL  6-29 days.................<15.0 mg/dL       Alkaline Phosphatase   Date Value Ref Range Status   04/22/2020 73 55 - 135 U/L Final     AST   Date Value Ref Range Status   04/22/2020 15 10 - 40 U/L Final     ALT   Date Value Ref Range Status   04/22/2020 18 10 - 44 U/L Final     Anion Gap   Date Value Ref Range Status   04/22/2020 8 8 - 16 mmol/L Final     eGFR if    Date Value Ref Range Status   04/22/2020 60 >60 mL/min/1.73 m^2 Final     eGFR if non    Date Value Ref Range Status   04/22/2020 52 (A) >60 mL/min/1.73 m^2 Final     Comment:     Calculation used to obtain the estimated glomerular filtration  rate (eGFR) is the CKD-EPI equation.        CTA resolution of PE , nodules stable   Positive NURIA (antinuclear antibody)  -     CBC auto differential; Standing  -     ANTI-XA HEPARIN MONITORING; Future; Expected date: 05/01/2020  -     CMP; Future; Expected date: 05/01/2020    Bilateral pulmonary embolism  -     Ambulatory referral/consult to Hematology / Oncology  -     CBC auto differential; Standing  -     ANTI-XA HEPARIN MONITORING; Future; Expected date: 05/01/2020  -     CMP; Future; Expected date: 05/01/2020    Anticoagulated on eliquis    Morbid obesity with BMI of 40.0-44.9, adult             Patient must stop the use of Megace as this can increase the risk of thromboembolism  She needs close surveillance for secondary malignancy such as breast cancer while taking interferon beta:  mammogram negative September 2019  She has bilateral pulmonary nodules that need follow-up routinely  She will undergo endometrial ablation and D and C in the near future once the pandemic stay at home order is lifted in the meantime I would like her to stay on Eliquis 2.5 mg p.o. daily rather than b.i.d..  She is to maintain proper hydration and stay active  Will touch base with her in a month with further labs  Dr Hernandez is her gyn   Appreciate gyn;s help    Thank you for allowing me to evaluate and participate in the care of this pleasant patient. Please fell free to call me with any questions or concerns.    Warmly,  Meg Pinto MD    This note was dictated with Dragon and briefly proofread. Please excuse any sentences that may be unclear or words misspelled

## 2020-05-05 ENCOUNTER — TELEPHONE (OUTPATIENT)
Dept: HEMATOLOGY/ONCOLOGY | Facility: CLINIC | Age: 70
End: 2020-05-05

## 2020-05-05 ENCOUNTER — PATIENT MESSAGE (OUTPATIENT)
Dept: ADMINISTRATIVE | Facility: HOSPITAL | Age: 70
End: 2020-05-05

## 2020-05-05 DIAGNOSIS — Z79.01 ANTICOAGULATED: Primary | ICD-10-CM

## 2020-05-05 NOTE — TELEPHONE ENCOUNTER
This nurse spoke to patient and scheduled 1 month f/u with Dr. Pinto via VV and labs the week prior.  Dates, times and locations confirmed.  No further questions at this time.    ----- Message from Colleen Sanders, RN sent at 5/4/2020  5:19 PM CDT -----  Will you please schedule her for me?     ----- Message -----  From: Meg Pinto MD  Sent: 5/1/2020   2:21 PM CDT  To: Colleen Sanders, RN    rtc 1 month with cbc and cmp anti Xa

## 2020-05-19 ENCOUNTER — PATIENT MESSAGE (OUTPATIENT)
Dept: HEMATOLOGY/ONCOLOGY | Facility: CLINIC | Age: 70
End: 2020-05-19

## 2020-05-19 ENCOUNTER — PATIENT MESSAGE (OUTPATIENT)
Dept: FAMILY MEDICINE | Facility: CLINIC | Age: 70
End: 2020-05-19

## 2020-05-21 ENCOUNTER — PATIENT OUTREACH (OUTPATIENT)
Dept: OTHER | Facility: OTHER | Age: 70
End: 2020-05-21

## 2020-05-21 ENCOUNTER — PATIENT MESSAGE (OUTPATIENT)
Dept: NEUROLOGY | Facility: CLINIC | Age: 70
End: 2020-05-21

## 2020-05-21 DIAGNOSIS — G35 MULTIPLE SCLEROSIS: ICD-10-CM

## 2020-05-21 RX ORDER — INTERFERON BETA-1A 30MCG/.5ML
KIT INTRAMUSCULAR
Qty: 12 PEN | Refills: 1 | Status: SHIPPED | OUTPATIENT
Start: 2020-05-21 | End: 2020-09-01 | Stop reason: SDUPTHER

## 2020-05-25 ENCOUNTER — LAB VISIT (OUTPATIENT)
Dept: LAB | Facility: HOSPITAL | Age: 70
End: 2020-05-25
Attending: FAMILY MEDICINE
Payer: MEDICARE

## 2020-05-25 DIAGNOSIS — Z79.01 ANTICOAGULATED: Primary | ICD-10-CM

## 2020-05-25 DIAGNOSIS — E11.69 HYPERLIPIDEMIA ASSOCIATED WITH TYPE 2 DIABETES MELLITUS: ICD-10-CM

## 2020-05-25 DIAGNOSIS — E11.9 TYPE 2 DIABETES MELLITUS WITHOUT COMPLICATION, WITHOUT LONG-TERM CURRENT USE OF INSULIN: ICD-10-CM

## 2020-05-25 DIAGNOSIS — E78.5 HYPERLIPIDEMIA ASSOCIATED WITH TYPE 2 DIABETES MELLITUS: ICD-10-CM

## 2020-05-25 DIAGNOSIS — D64.9 ANEMIA, UNSPECIFIED: ICD-10-CM

## 2020-05-25 DIAGNOSIS — I26.99 BILATERAL PULMONARY EMBOLISM: ICD-10-CM

## 2020-05-25 LAB
ALBUMIN SERPL BCP-MCNC: 3.8 G/DL (ref 3.5–5.2)
ALP SERPL-CCNC: 74 U/L (ref 55–135)
ALT SERPL W/O P-5'-P-CCNC: 24 U/L (ref 10–44)
ANION GAP SERPL CALC-SCNC: 15 MMOL/L (ref 8–16)
AST SERPL-CCNC: 25 U/L (ref 10–40)
BASOPHILS # BLD AUTO: 0.04 K/UL (ref 0–0.2)
BASOPHILS NFR BLD: 1 % (ref 0–1.9)
BILIRUB SERPL-MCNC: 0.8 MG/DL (ref 0.1–1)
BUN SERPL-MCNC: 20 MG/DL (ref 8–23)
CALCIUM SERPL-MCNC: 9 MG/DL (ref 8.7–10.5)
CHLORIDE SERPL-SCNC: 105 MMOL/L (ref 95–110)
CHOLEST SERPL-MCNC: 177 MG/DL (ref 120–199)
CHOLEST/HDLC SERPL: 3.3 {RATIO} (ref 2–5)
CO2 SERPL-SCNC: 22 MMOL/L (ref 23–29)
CREAT SERPL-MCNC: 1.1 MG/DL (ref 0.5–1.4)
DIFFERENTIAL METHOD: ABNORMAL
EOSINOPHIL # BLD AUTO: 0.1 K/UL (ref 0–0.5)
EOSINOPHIL NFR BLD: 1.5 % (ref 0–8)
ERYTHROCYTE [DISTWIDTH] IN BLOOD BY AUTOMATED COUNT: 13.7 % (ref 11.5–14.5)
EST. GFR  (AFRICAN AMERICAN): 59.6 ML/MIN/1.73 M^2
EST. GFR  (NON AFRICAN AMERICAN): 51.7 ML/MIN/1.73 M^2
ESTIMATED AVG GLUCOSE: 105 MG/DL (ref 68–131)
GLUCOSE SERPL-MCNC: 92 MG/DL (ref 70–110)
HBA1C MFR BLD HPLC: 5.3 % (ref 4–5.6)
HCT VFR BLD AUTO: 42.4 % (ref 37–48.5)
HDLC SERPL-MCNC: 54 MG/DL (ref 40–75)
HDLC SERPL: 30.5 % (ref 20–50)
HGB BLD-MCNC: 13.3 G/DL (ref 12–16)
IMM GRANULOCYTES # BLD AUTO: 0.01 K/UL (ref 0–0.04)
IMM GRANULOCYTES NFR BLD AUTO: 0.3 % (ref 0–0.5)
LDLC SERPL CALC-MCNC: 104 MG/DL (ref 63–159)
LYMPHOCYTES # BLD AUTO: 0.7 K/UL (ref 1–4.8)
LYMPHOCYTES NFR BLD: 17.8 % (ref 18–48)
MCH RBC QN AUTO: 29.6 PG (ref 27–31)
MCHC RBC AUTO-ENTMCNC: 31.4 G/DL (ref 32–36)
MCV RBC AUTO: 94 FL (ref 82–98)
MONOCYTES # BLD AUTO: 0.4 K/UL (ref 0.3–1)
MONOCYTES NFR BLD: 10.8 % (ref 4–15)
NEUTROPHILS # BLD AUTO: 2.7 K/UL (ref 1.8–7.7)
NEUTROPHILS NFR BLD: 68.6 % (ref 38–73)
NONHDLC SERPL-MCNC: 123 MG/DL
NRBC BLD-RTO: 0 /100 WBC
PLATELET # BLD AUTO: 167 K/UL (ref 150–350)
PMV BLD AUTO: 12 FL (ref 9.2–12.9)
POTASSIUM SERPL-SCNC: 3.7 MMOL/L (ref 3.5–5.1)
PROT SERPL-MCNC: 6.5 G/DL (ref 6–8.4)
RBC # BLD AUTO: 4.49 M/UL (ref 4–5.4)
SODIUM SERPL-SCNC: 142 MMOL/L (ref 136–145)
TRIGL SERPL-MCNC: 95 MG/DL (ref 30–150)
WBC # BLD AUTO: 3.98 K/UL (ref 3.9–12.7)

## 2020-05-25 PROCEDURE — 85025 COMPLETE CBC W/AUTO DIFF WBC: CPT

## 2020-05-25 PROCEDURE — 36415 COLL VENOUS BLD VENIPUNCTURE: CPT | Mod: PO

## 2020-05-25 PROCEDURE — 80053 COMPREHEN METABOLIC PANEL: CPT

## 2020-05-25 PROCEDURE — 83036 HEMOGLOBIN GLYCOSYLATED A1C: CPT

## 2020-05-25 PROCEDURE — 80061 LIPID PANEL: CPT

## 2020-05-27 ENCOUNTER — TELEPHONE (OUTPATIENT)
Dept: FAMILY MEDICINE | Facility: CLINIC | Age: 70
End: 2020-05-27

## 2020-05-27 ENCOUNTER — LAB VISIT (OUTPATIENT)
Dept: LAB | Facility: HOSPITAL | Age: 70
End: 2020-05-27
Attending: INTERNAL MEDICINE
Payer: MEDICARE

## 2020-05-27 DIAGNOSIS — Z79.01 ANTICOAGULATED: ICD-10-CM

## 2020-05-27 DIAGNOSIS — I26.99 BILATERAL PULMONARY EMBOLISM: ICD-10-CM

## 2020-05-27 LAB
ALBUMIN SERPL BCP-MCNC: 4 G/DL (ref 3.5–5.2)
ALP SERPL-CCNC: 80 U/L (ref 55–135)
ALT SERPL W/O P-5'-P-CCNC: 30 U/L (ref 10–44)
ANION GAP SERPL CALC-SCNC: 10 MMOL/L (ref 8–16)
AST SERPL-CCNC: 30 U/L (ref 10–40)
BASOPHILS # BLD AUTO: 0.02 K/UL (ref 0–0.2)
BASOPHILS NFR BLD: 0.4 % (ref 0–1.9)
BILIRUB SERPL-MCNC: 0.8 MG/DL (ref 0.1–1)
BUN SERPL-MCNC: 13 MG/DL (ref 8–23)
CALCIUM SERPL-MCNC: 9.5 MG/DL (ref 8.7–10.5)
CHLORIDE SERPL-SCNC: 105 MMOL/L (ref 95–110)
CO2 SERPL-SCNC: 27 MMOL/L (ref 23–29)
CREAT SERPL-MCNC: 1.2 MG/DL (ref 0.5–1.4)
DIFFERENTIAL METHOD: ABNORMAL
EOSINOPHIL # BLD AUTO: 0.1 K/UL (ref 0–0.5)
EOSINOPHIL NFR BLD: 1.5 % (ref 0–8)
ERYTHROCYTE [DISTWIDTH] IN BLOOD BY AUTOMATED COUNT: 13.4 % (ref 11.5–14.5)
EST. GFR  (AFRICAN AMERICAN): 54 ML/MIN/1.73 M^2
EST. GFR  (NON AFRICAN AMERICAN): 47 ML/MIN/1.73 M^2
FACT X PPP CHRO-ACNC: 0.4 IU/ML (ref 0.3–0.7)
GLUCOSE SERPL-MCNC: 108 MG/DL (ref 70–110)
HCT VFR BLD AUTO: 44.3 % (ref 37–48.5)
HGB BLD-MCNC: 14.1 G/DL (ref 12–16)
IMM GRANULOCYTES # BLD AUTO: 0.01 K/UL (ref 0–0.04)
IMM GRANULOCYTES NFR BLD AUTO: 0.2 % (ref 0–0.5)
LYMPHOCYTES # BLD AUTO: 1.2 K/UL (ref 1–4.8)
LYMPHOCYTES NFR BLD: 22.2 % (ref 18–48)
MCH RBC QN AUTO: 30 PG (ref 27–31)
MCHC RBC AUTO-ENTMCNC: 31.8 G/DL (ref 32–36)
MCV RBC AUTO: 94 FL (ref 82–98)
MONOCYTES # BLD AUTO: 0.5 K/UL (ref 0.3–1)
MONOCYTES NFR BLD: 8.7 % (ref 4–15)
NEUTROPHILS # BLD AUTO: 3.5 K/UL (ref 1.8–7.7)
NEUTROPHILS NFR BLD: 67 % (ref 38–73)
NRBC BLD-RTO: 0 /100 WBC
PLATELET # BLD AUTO: 197 K/UL (ref 150–350)
PMV BLD AUTO: 11 FL (ref 9.2–12.9)
POTASSIUM SERPL-SCNC: 4.2 MMOL/L (ref 3.5–5.1)
PROT SERPL-MCNC: 6.7 G/DL (ref 6–8.4)
RBC # BLD AUTO: 4.7 M/UL (ref 4–5.4)
SODIUM SERPL-SCNC: 142 MMOL/L (ref 136–145)
WBC # BLD AUTO: 5.19 K/UL (ref 3.9–12.7)

## 2020-05-27 PROCEDURE — 80053 COMPREHEN METABOLIC PANEL: CPT

## 2020-05-27 PROCEDURE — 85025 COMPLETE CBC W/AUTO DIFF WBC: CPT

## 2020-05-27 PROCEDURE — 85520 HEPARIN ASSAY: CPT

## 2020-05-27 PROCEDURE — 36415 COLL VENOUS BLD VENIPUNCTURE: CPT

## 2020-05-28 ENCOUNTER — OFFICE VISIT (OUTPATIENT)
Dept: FAMILY MEDICINE | Facility: CLINIC | Age: 70
End: 2020-05-28
Payer: MEDICARE

## 2020-05-28 ENCOUNTER — PATIENT OUTREACH (OUTPATIENT)
Dept: ADMINISTRATIVE | Facility: HOSPITAL | Age: 70
End: 2020-05-28

## 2020-05-28 VITALS
HEIGHT: 66 IN | SYSTOLIC BLOOD PRESSURE: 110 MMHG | WEIGHT: 230.19 LBS | OXYGEN SATURATION: 96 % | RESPIRATION RATE: 12 BRPM | HEART RATE: 93 BPM | TEMPERATURE: 99 F | BODY MASS INDEX: 36.99 KG/M2 | DIASTOLIC BLOOD PRESSURE: 70 MMHG

## 2020-05-28 DIAGNOSIS — E11.9 TYPE 2 DIABETES MELLITUS WITHOUT COMPLICATION, WITHOUT LONG-TERM CURRENT USE OF INSULIN: Primary | ICD-10-CM

## 2020-05-28 DIAGNOSIS — Z79.01 ANTICOAGULATED: ICD-10-CM

## 2020-05-28 DIAGNOSIS — E66.01 MORBID OBESITY WITH BMI OF 40.0-44.9, ADULT: ICD-10-CM

## 2020-05-28 DIAGNOSIS — Z01.810 PREOP CARDIOVASCULAR EXAM: ICD-10-CM

## 2020-05-28 DIAGNOSIS — G35 MS (MULTIPLE SCLEROSIS): ICD-10-CM

## 2020-05-28 DIAGNOSIS — I26.99 BILATERAL PULMONARY EMBOLISM: ICD-10-CM

## 2020-05-28 PROCEDURE — 99214 PR OFFICE/OUTPT VISIT, EST, LEVL IV, 30-39 MIN: ICD-10-PCS | Mod: S$PBB,,, | Performed by: FAMILY MEDICINE

## 2020-05-28 PROCEDURE — 99214 OFFICE O/P EST MOD 30 MIN: CPT | Mod: S$PBB,,, | Performed by: FAMILY MEDICINE

## 2020-05-28 PROCEDURE — 99214 OFFICE O/P EST MOD 30 MIN: CPT | Mod: PBBFAC,PO | Performed by: FAMILY MEDICINE

## 2020-05-28 PROCEDURE — 99999 PR PBB SHADOW E&M-EST. PATIENT-LVL IV: ICD-10-PCS | Mod: PBBFAC,,, | Performed by: FAMILY MEDICINE

## 2020-05-28 PROCEDURE — 99999 PR PBB SHADOW E&M-EST. PATIENT-LVL IV: CPT | Mod: PBBFAC,,, | Performed by: FAMILY MEDICINE

## 2020-05-28 NOTE — Clinical Note
Note addended to include surgical clearance for endometrial biopsy. Needs nurse only ECG to complete preop. Forward note to Dr. chiu

## 2020-05-28 NOTE — PROGRESS NOTES
Subjective:       Patient ID: Naomi Toledo is a 70 y.o. female.    Chief Complaint: Follow-up (3mth f/u diabetes)    HPI  Review of Systems   Constitutional: Negative for activity change and unexpected weight change.   HENT: Negative for hearing loss, rhinorrhea and trouble swallowing.    Eyes: Negative for discharge and visual disturbance.   Respiratory: Negative for chest tightness and wheezing.    Cardiovascular: Negative for chest pain and palpitations.   Gastrointestinal: Negative for blood in stool, constipation, diarrhea and vomiting.   Endocrine: Negative for polydipsia and polyuria.   Genitourinary: Negative for difficulty urinating, dysuria, hematuria and menstrual problem.   Musculoskeletal: Negative for arthralgias, joint swelling and neck pain.   Neurological: Negative for weakness and headaches.   Psychiatric/Behavioral: Negative for confusion and dysphoric mood.       Patient Active Problem List   Diagnosis    Hyperglycemia    MS (multiple sclerosis)    Positive NURIA (antinuclear antibody)    Encounter for long-term (current) use of high-risk medication    Urinary urgency    Morbid obesity with BMI of 40.0-44.9, adult    Acne rosacea    Bilateral pulmonary embolism    Type 2 diabetes mellitus without complication, without long-term current use of insulin    Anticoagulated on eliquis    Gait disturbance     Patient is here for a chronic conditions follow up.    271 lbs to 230 doing [facility] diabetic diet. Has been restricting her calories to 1000 yonis/day. Walks 3-4 miles a day. Weight loss is stagnating      Recent diagnosis of ihsan PE 3/10/20 . Had mercado and some CP.  On eliquis now.  Sx have resolved. No clot in LE .  No h/o clots. Has appt Dr. Pinto hypercoag work up-can postpone 3 months and Dr. Moncada 5/25/20 as well for another opinion on how long she will need to be on blood thinner.  Has + NURIA     GYN Dr. Hernandez post menopausal bleeding. EMB done since last visit- benign. On megace  x 2 months and f/u u/s planned  Scheduled for endometrial ablation 6/10/20      GI Dr. Rich fatty liver. Had colonoscopy and had 2 benign polyps and needs surveillance in 5 years     Neuro Dr. Roth MS on AVONEX     Reviewed labs 5/20 type 2 DM A1c 5.3  , lipids at goal. Urine ma normal.  Not on ACEI/ARB or statin.      Still trying to sell her home and move to [facility].    PRE-OP CONSULTATION FOR SURGERY    PROCEDURE:endometrial ablation  PHYSICIAN:Dr. chiu  DATE:6/10/20    PRIOR SURGERY:   NO PROBLEM WITH ANESTHESIA    CV ROS: NO CHEST PAIN, SHORTNESS OF BREATH, POOR EXERCISE TOLERANCE.  NO H/O CAD, PALPITATIONS, SYNCOPE, CHF OR OTHER CARDIAC PROBLEMS.  PATIENT IS ABLE TO WALK A FLIGHT OF STAIRS WITHOUT STOPPING TO REST    RESP ROS: NO H/O LUNG PROBLEMS SUCH AS COPD OR ASTHMA.  PATIENT IS A NON-SMOKER    CARDIAC RF: type 2 diabetes mellitus, sedentary lifestyle and obesity    ASA class 2  Objective:      Physical Exam   Constitutional: She is oriented to person, place, and time. She appears well-developed and well-nourished.   Cardiovascular: Normal rate, regular rhythm and normal heart sounds.   Pulmonary/Chest: Effort normal and breath sounds normal.   Musculoskeletal: She exhibits no edema.   Neurological: She is alert and oriented to person, place, and time.   Skin: Skin is warm and dry.   Psychiatric: She has a normal mood and affect.   Nursing note and vitals reviewed.      Assessment:       1. Type 2 diabetes mellitus without complication, without long-term current use of insulin    2. MS (multiple sclerosis)    3. Bilateral pulmonary embolism    4. Anticoagulated on eliquis    5. Morbid obesity with BMI of 40.0-44.9, adult        Plan:         1. Type 2 diabetes mellitus without complication, without long-term current use of insulin  Non diabetic range.  Hold metfomrin and cont monitoring with digital DM program.  Restart 500mg xr a day iof consistently > 150  - Comprehensive metabolic panel;  "Future  - Hemoglobin A1C; Future    2. MS (multiple sclerosis)  Cont neuro mgmt    3. Bilateral pulmonary embolism  Cont eliquis and heme/onc hypercoag workup    4. Anticoagulated on eliquis  Cont eliquis    5. Morbid obesity with BMI of 40.0-44.9, adult  Counseled patient on his ideal body weight, health consequences of being obese and current recommendations including weekly exercise and a heart healthy diet.  Current BMI is:Estimated body mass index is 37.15 kg/m² as calculated from the following:    Height as of this encounter: 5' 6" (1.676 m).    Weight as of this encounter: 104.4 kg (230 lb 2.6 oz)..  Patient is aware that ideal BMI < 25 or Weight in (lb) to have BMI = 25: 154.6.        6. Preop cardiovascular exam  Patient may proceed with surgery with low risk of perioperative cardiovascular complications.  Jackson perioperative risk score: .16%  Patients whose estimated risk of death is less than 1 percent are labeled as being low risk and require no additional cardiovascular testing.    Higher-risk patients -- Patients whose risk of death is 1 percent or higher may require additional cardiovascular evaluation.    1. Preoperative workup as follows  ECG, hemoglobin, hematocrit, electrolytes, creatinine, glucose.  2. Change in medication regimen before surgery: AVOID NSAIDS FOR 5-7 DAYS PRIOR TO PROCEDURE.  Continue medication regimen including morning of surgery, with sip of water.  3. Prophylaxis for cardiac events with perioperative beta-blockers: not indicated.  4. Invasive hemodynamic monitoring perioperatively: at the discretion of anesthesiologist.  5. Deep vein thrombosis prophylaxis postoperatively:regimen to be chosen by surgical team.  6. Surveillance for postoperative MI with ECG immediately postoperatively and on postoperative days 1 and 2 AND troponin levels 24 hours postoperatively and on day 4 or hospital discharge (whichever comes first): at the discretion of anesthesiologist.              Time " spent with patient: 20 minutes    Patient with be reevaluated in 3 months or sooner prn    Greater than 50% of this visit was spent counseling as described in above documentation:Yes

## 2020-05-31 PROCEDURE — 99454 REM MNTR PHYSIOL PARAM 16-30: CPT | Mod: PBBFAC,PO | Performed by: FAMILY MEDICINE

## 2020-06-02 ENCOUNTER — PATIENT MESSAGE (OUTPATIENT)
Dept: HEMATOLOGY/ONCOLOGY | Facility: CLINIC | Age: 70
End: 2020-06-02

## 2020-06-02 ENCOUNTER — OFFICE VISIT (OUTPATIENT)
Dept: HEMATOLOGY/ONCOLOGY | Facility: CLINIC | Age: 70
End: 2020-06-02
Payer: MEDICARE

## 2020-06-02 ENCOUNTER — TELEPHONE (OUTPATIENT)
Dept: HEMATOLOGY/ONCOLOGY | Facility: CLINIC | Age: 70
End: 2020-06-02

## 2020-06-02 DIAGNOSIS — Z87.42 HISTORY OF VAGINAL BLEEDING: ICD-10-CM

## 2020-06-02 DIAGNOSIS — G35 MS (MULTIPLE SCLEROSIS): ICD-10-CM

## 2020-06-02 DIAGNOSIS — R76.8 POSITIVE ANA (ANTINUCLEAR ANTIBODY): ICD-10-CM

## 2020-06-02 DIAGNOSIS — I26.99 BILATERAL PULMONARY EMBOLISM: Primary | ICD-10-CM

## 2020-06-02 DIAGNOSIS — Z79.01 ANTICOAGULATED: ICD-10-CM

## 2020-06-02 PROCEDURE — 99215 PR OFFICE/OUTPT VISIT, EST, LEVL V, 40-54 MIN: ICD-10-PCS | Mod: 95,,, | Performed by: INTERNAL MEDICINE

## 2020-06-02 PROCEDURE — 99215 OFFICE O/P EST HI 40 MIN: CPT | Mod: 95,,, | Performed by: INTERNAL MEDICINE

## 2020-06-02 NOTE — LETTER
June 2, 2020    Naomi Toledo  [address]  [place] [address]             [facility]  [address]  [place] [address]  Phone: [phone] Dear Dr. Hernandez,     Ms. Naomi Toledo met with Dr. Meg Pinto this morning regarding her anticoagulants and her upcoming surgery. Dr. Pinto recommends that Ms. Toledo hold Eliquis five days prior to her procedure and then resume it 24 hours later as long as hemostasis is achieved.     Also, Dr. Pinto does not clear patients for surgery, but she has directed Ms. Toledo to her primary care physician for a clearance if necessary.     Please do not hesitate to let us know if you have any further questions or concerns.     Thank you!                       Sincerely,                  @Colleen Sanders RN

## 2020-06-02 NOTE — TELEPHONE ENCOUNTER
Letter in chart faxed over to Dr. Hernandez's office.     ----- Message from Meg Pinto MD sent at 6/2/2020  9:27 AM CDT -----  Fax a note to Dr Hernandez please that pt will hold eliquis 5 days prior to D and C then resume 24 hours after as long as hemostasis is acchieved. Include that I do not clear patients for surgery but I have directed her to her primary care in case he needs a clearance

## 2020-06-02 NOTE — PROGRESS NOTES
The patient location is:  At home  The chief complaint leading to consultation is:  I had a blood clot   Patient has consented to this visit via televisit  Visit type: Virtual visit with synchronous audio and video  With haiku and phone as haiku not working well with background noise   Total time spent with patient: over  40 min with more than 90% on medical discussion, counseling and coordination of care.  Each patient to whom he or she provides medical services by telemedicine is:  (1) informed of the relationship between the physician and patient and the respective role of any other health care provider with respect to management of the patient; and (2) notified that he or she may decline to receive medical services by telemedicine and may withdraw from such care at any time.    This visit is to also involve counseling, patient education, informed consent for ordered diagnostic and laboratory tests and motivational interviewing      CC   I am having surgery Emmy 10     Naomi Toledo is a 70 y.o.     This is a 70-year-old female who presented to the emergency room on March 10 with increasing shortness of breath and chest pain.  CT of chest was performed which revealed bilateral pulmonary emboli with moderate clot burden.  Of note was an incidental finding of a 3 mm right upper lobe lung nodule which had been there prior and was noted as stable.  Patient had been on Megace for weight loss associated with multiple sclerosis as well as interferon beta.  She currently is tolerating Eliquis 5 mg p.o. b.i.d. without any complications and no evidence of bleeding.  Ultrasound of her legs were performed revealing no evidence of thromboembolism  She was on megace for gyn purposes.     Imaging March 10, 2020 CT of chest revealed  Chart and records reviewed    Impression       1. Bilateral pulmonary emboli with moderate clot burden.  2. Trace left pleural effusion and basilar airspace disease suggesting  atelectasis.      Electronically signed by: Papo Jesus  Date: 03/10/2020  Time: 07:56       April 7   Pt has been checking her BP  Running 112/63 , she had one episode 95/55; She is not on any medicine for hypertension  She reports she has completely changed her diet after being labeled with diabetes she is following the [place] diabetic diet  She has lost 33 pounds in the last 3 pounds   Weight is 242 pounds     April 23, 2020  Patient experiencing new onset of what she calls helping and puffing.  She is having some shortness of breath at rest.  She has been having dysfunctional uterine bleeding since taking Eliquis 5 mg p.o. b.i.d..  She started this medicine in March and started having bleeding approximately 1-2 weeks ago.  She has seen her gyn.  Labs have been ordered to reassess her hemoglobin.  She is wearing a pad and states she feels that it is uterine bleeding as it is not increased when she urinates.  Today I will review her anticoagulant markers try to make some adjustments.  She will be seeing her gyn physician again and discussing possible ablation    May 1, 2020 patient remains with slight vaginal spotting.  She has decreased her Eliquis from 5 mg p.o. b.i.d. to 2.5 mg b.i.d. as we discussed last office visit.  I appreciate the help of her gynecologist to his going to schedule her for a D&C in ablation.  Today will review her labs which revealing stable hemoglobin yet prolonged anti 10a as expected.  CT of chest reveals that the pulmonary emboli have resolved completely although she remains with some slight small sub pulmonary nodules      June 2 2020  No further bleeding on eliquis 2.5 mg QD, to discuss when to hold eliquis prior to surgery, patient is breathing well no shortness of breath Last CT revealed resolution of pulmonary emboli anti Xa is normal    Past Medical History:   Diagnosis Date    Blood transfusion     Deep vein thrombosis     MS (multiple sclerosis)     Plantar fasciitis of  left foot 2/2014     Past Surgical History:   Procedure Laterality Date    OVARIAN CYST SURGERY  1974    TUBAL LIGATION      VEIN SURGERY         Current Outpatient Medications:     apixaban (ELIQUIS) 2.5 mg Tab, Take 1 tablet (2.5 mg total) by mouth 2 (two) times daily., Disp: 60 tablet, Rfl: 0    AVONEX 30 mcg/0.5 mL PnKt, INJECT 0.5 ML (30 MCG) INTO THE MUSCLE EVERY 7 DAYS, Disp: 12 pen, Rfl: 1    blood sugar diagnostic Strp, 1 x day testing, Disp: 100 strip, Rfl: 3    blood-glucose meter (FREESTYLE FREEDOM) kit, Use as instructed, Disp: 1 each, Rfl: 0    blood-glucose meter kit, Use as instructed, Disp: 1 each, Rfl: 0    cholecalciferol, vitamin D3, (VITAMIN D3) 5,000 unit Tab, Take 5,000 Units by mouth once daily., Disp: , Rfl:     lancets (ACCU-CHEK SOFTCLIX LANCETS) Misc, Test 1 x day, Disp: 100 each, Rfl: 3    minocycline (MINOCIN,DYNACIN) 50 MG Cap, , Disp: , Rfl:     RHOFADE 1 % Crea, , Disp: , Rfl:   Review of patient's allergies indicates:   Allergen Reactions    Morphine Itching     Social History     Tobacco Use    Smoking status: Never Smoker    Smokeless tobacco: Never Used   Substance Use Topics    Alcohol use: No     Frequency: Never    Drug use: No     Family History   Problem Relation Age of Onset    Cancer Mother         breast    Breast cancer Mother 50    Hypertension Father     Cancer Sister     Hypertension Sister     Breast cancer Sister 64    Cancer Brother     Diabetes Maternal Grandmother     Leukemia Paternal Grandmother        CONSTITUTIONAL: No fevers, chills, night sweats, wt. loss, appetite changes  SKIN: no rashes or itching  ENT: No headaches, head trauma, vision changes, or eye pain  LYMPH NODES: None noticed   CV: No chest pain, palpitations.   RESP: No dyspnea on exertion, cough, wheezing, or hemoptysis  GI: No nausea, emesis, diarrhea, constipation, melena, hematochezia, pain.   : No dysuria, hematuria, urgency, or frequency   HEME:   Less easy  bruising, bleeding problems  PSYCHIATRIC: No depression, anxiety, psychosis, hallucinations.  NEURO: No seizures, memory loss, dizziness or difficulty sleeping  MSK: No arthralgias or joint swelling      General patient is in no distress well developed well nourished  Psych pleasant affect no evidence of depression no evidence of anxiety  Head normocephalic atraumatic, no hoarseness,  well-spoken speech intact  Eyes noninjected sclera conjunctiva appear pink  Face is symmetric  Skin intact no lesions noted no ecchymosis no rash  Neck with no limited range of motion no JVD evident  Chest with no use of accessory muscles no labored breathing no evidence of tachypnea  No respiratory distress, effort normal   Abdomen appears to be nondistended  Extremities with no cyanosis no evidence of edema  Neuro muscular cranial nerves appear grossly intact  Gait appears steady  No joint effusions  Behavior normal judgment normal      Lab Results   Component Value Date    WBC 8.98 03/11/2020    HGB 14.4 03/11/2020    HCT 43.5 03/11/2020    MCV 89 03/11/2020     03/11/2020     Lab Results   Component Value Date    WBC 6.69 04/22/2020    HGB 14.1 04/22/2020    HCT 44.1 04/22/2020    MCV 92 04/22/2020     04/22/2020           CMP  Sodium   Date Value Ref Range Status   05/27/2020 142 136 - 145 mmol/L Final     Potassium   Date Value Ref Range Status   05/27/2020 4.2 3.5 - 5.1 mmol/L Final     Chloride   Date Value Ref Range Status   05/27/2020 105 95 - 110 mmol/L Final     CO2   Date Value Ref Range Status   05/27/2020 27 23 - 29 mmol/L Final     Glucose   Date Value Ref Range Status   05/27/2020 108 70 - 110 mg/dL Final     BUN, Bld   Date Value Ref Range Status   05/27/2020 13 8 - 23 mg/dL Final     Creatinine   Date Value Ref Range Status   05/27/2020 1.2 0.5 - 1.4 mg/dL Final     Calcium   Date Value Ref Range Status   05/27/2020 9.5 8.7 - 10.5 mg/dL Final     Total Protein   Date Value Ref Range Status   05/27/2020  6.7 6.0 - 8.4 g/dL Final     Albumin   Date Value Ref Range Status   05/27/2020 4.0 3.5 - 5.2 g/dL Final     Total Bilirubin   Date Value Ref Range Status   05/27/2020 0.8 0.1 - 1.0 mg/dL Final     Comment:     For infants and newborns, interpretation of results should be based  on gestational age, weight and in agreement with clinical  observations.  Premature Infant recommended reference ranges:  Up to 24 hours.............<8.0 mg/dL  Up to 48 hours............<12.0 mg/dL  3-5 days..................<15.0 mg/dL  6-29 days.................<15.0 mg/dL       Alkaline Phosphatase   Date Value Ref Range Status   05/27/2020 80 55 - 135 U/L Final     AST   Date Value Ref Range Status   05/27/2020 30 10 - 40 U/L Final     ALT   Date Value Ref Range Status   05/27/2020 30 10 - 44 U/L Final     Anion Gap   Date Value Ref Range Status   05/27/2020 10 8 - 16 mmol/L Final     eGFR if    Date Value Ref Range Status   05/27/2020 54 (A) >60 mL/min/1.73 m^2 Final     eGFR if non    Date Value Ref Range Status   05/27/2020 47 (A) >60 mL/min/1.73 m^2 Final     Comment:     Calculation used to obtain the estimated glomerular filtration  rate (eGFR) is the CKD-EPI equation.        CLINICAL HISTORY:  bleeding on anticoag and repeat cta needed to assess further with bilateral PE;Other pulmonary embolism without acute cor pulmonale    TECHNIQUE:  Low dose axial images, sagittal and coronal reformations were obtained from the thoracic inlet to the lung bases following the IV administration of 100 mL of Omnipaque 350.  Contrast timing was optimized to evaluate the pulmonary arteries.  MIP images were performed.    COMPARISON:  03/10/2020    FINDINGS:  Target pulmonary nodule right upper lobe 3 mm series 3, image 190.  Additional non target pulmonary nodule measuring 3 mm right lower lobe series 3, image 233.  No filling defect in the central airways.  No pleural effusion or pneumo thorax.  Normal size heart.   No pulmonary artery filling defect to the segmental level bilaterally.  Several dystrophic calcifications and hypodense lesions in the thyroid.  No mediastinal adenopathy.  Mild multilevel degenerative changes in the spine.      Impression       Resolution of pulmonary emboli.  Unchanged pulmonary nodules.         CTA resolution of PE , nodules stable   Bilateral pulmonary embolism    Anticoagulated on eliquis    Positive NURIA (antinuclear antibody)    MS (multiple sclerosis)    History of vaginal bleeding                She needs close surveillance for secondary malignancy such as breast cancer while taking interferon beta: mammogram negative September 2019  She has bilateral pulmonary nodules that need follow-up routinely   renal insufficiency possibly due to a component of dehydration no need to adjust her Eliquis dose any further but she should increase her hydration at electrolytes and her diet is well  She will undergo endometrial ablation and D and C in the near future once the pandemic stay at home order is lifted in the meantime I would like her to stay on Eliquis 2.5 mg p.o. daily rather than b.i.d since she has not  Had any further vaginal bleeding  [facility]   She is to hold eliquis for 5 days before procedure then resume,  once Dr Hernandez informs her that hemostasis has been achieved usually within 24 - 48 hours after procedure ,2.5 mg daily then see me in office with labs  Dr Hernandez is her gyn     Will see her back in the office 2-4 weeks after surgery when she is off Eliquis full inherited thrombophilia panel will be ordered just to make sure that her blood clot was related to Megace and not due to an inherited thrombophilia    Thank you for allowing me to evaluate and participate in the care of this pleasant patient. Please fell free to call me with any questions or concerns.    Warmly,  Meg Pinto MD    This note was dictated with Dragon and briefly proofread. Please excuse  any sentences that may be unclear or words misspelled

## 2020-06-02 NOTE — Clinical Note
Fax a note to Dr Hernandez please that pt will hold eliquis 5 days prior to D and C then resume 24 hours after as long as hemostasis is acchieved. Include that I do not clear patients for surgery but I have directed her to her primary care in case he needs a clearance

## 2020-06-04 ENCOUNTER — CLINICAL SUPPORT (OUTPATIENT)
Dept: FAMILY MEDICINE | Facility: CLINIC | Age: 70
End: 2020-06-04
Payer: MEDICARE

## 2020-06-04 PROCEDURE — 99211 OFF/OP EST MAY X REQ PHY/QHP: CPT | Mod: PBBFAC,PO,25

## 2020-06-04 PROCEDURE — 93010 ELECTROCARDIOGRAM REPORT: CPT | Mod: S$PBB,,, | Performed by: INTERNAL MEDICINE

## 2020-06-04 PROCEDURE — 93010 EKG 12-LEAD: ICD-10-PCS | Mod: S$PBB,,, | Performed by: INTERNAL MEDICINE

## 2020-06-04 PROCEDURE — 93005 ELECTROCARDIOGRAM TRACING: CPT | Mod: PBBFAC,PO | Performed by: INTERNAL MEDICINE

## 2020-06-04 PROCEDURE — 99999 PR PBB SHADOW E&M-EST. PATIENT-LVL I: ICD-10-PCS | Mod: PBBFAC,,,

## 2020-06-04 PROCEDURE — 99999 PR PBB SHADOW E&M-EST. PATIENT-LVL I: CPT | Mod: PBBFAC,,,

## 2020-06-04 NOTE — PROGRESS NOTES
EKG completed in office today. Will be available in chart on Epic within a few hours. Please notify if you need anything else for this patient.

## 2020-06-08 LAB
LEFT EYE DM RETINOPATHY: NEGATIVE
RIGHT EYE DM RETINOPATHY: NEGATIVE

## 2020-06-11 ENCOUNTER — PATIENT OUTREACH (OUTPATIENT)
Dept: OTHER | Facility: OTHER | Age: 70
End: 2020-06-11

## 2020-06-11 NOTE — PROGRESS NOTES
Patient previously with Marie De Leon NP. Called patient to introduce myself has her permanent clinician.     Congratulated patient on recent controlled A1C of 5.3%. PCP stopped metformin 05/28, and readings remain well controlled.     Last 6 Patient Entered Readings                                          Most Recent A1c: 5.3% on 5/25/2020  (Goal: 7%)     Recent Readings 4/19/2020 4/18/2020    Blood Glucose (mg/dL) 99 134        Patient encouraged to continue monitoring and discussed that although controlled without medication at this time, she is welcome in the program. Patient denies issue sor concerns today.     Provided contact information. Will continue to monitor and reach out as needed.

## 2020-06-24 ENCOUNTER — LAB VISIT (OUTPATIENT)
Dept: LAB | Facility: HOSPITAL | Age: 70
End: 2020-06-24
Attending: INTERNAL MEDICINE
Payer: MEDICARE

## 2020-06-24 DIAGNOSIS — Z79.01 ANTICOAGULATED: ICD-10-CM

## 2020-06-24 DIAGNOSIS — I26.99 BILATERAL PULMONARY EMBOLISM: ICD-10-CM

## 2020-06-24 DIAGNOSIS — R76.8 POSITIVE ANA (ANTINUCLEAR ANTIBODY): ICD-10-CM

## 2020-06-24 DIAGNOSIS — D64.9 ANEMIA, UNSPECIFIED: ICD-10-CM

## 2020-06-24 LAB
ALBUMIN SERPL BCP-MCNC: 3.7 G/DL (ref 3.5–5.2)
ALP SERPL-CCNC: 84 U/L (ref 55–135)
ALT SERPL W/O P-5'-P-CCNC: 14 U/L (ref 10–44)
ANION GAP SERPL CALC-SCNC: 12 MMOL/L (ref 8–16)
AST SERPL-CCNC: 13 U/L (ref 10–40)
BASOPHILS # BLD AUTO: 0.03 K/UL (ref 0–0.2)
BASOPHILS NFR BLD: 0.5 % (ref 0–1.9)
BILIRUB SERPL-MCNC: 0.6 MG/DL (ref 0.1–1)
BUN SERPL-MCNC: 32 MG/DL (ref 8–23)
CALCIUM SERPL-MCNC: 9.4 MG/DL (ref 8.7–10.5)
CHLORIDE SERPL-SCNC: 106 MMOL/L (ref 95–110)
CO2 SERPL-SCNC: 23 MMOL/L (ref 23–29)
CREAT SERPL-MCNC: 1.4 MG/DL (ref 0.5–1.4)
DIFFERENTIAL METHOD: NORMAL
EOSINOPHIL # BLD AUTO: 0.1 K/UL (ref 0–0.5)
EOSINOPHIL NFR BLD: 1 % (ref 0–8)
ERYTHROCYTE [DISTWIDTH] IN BLOOD BY AUTOMATED COUNT: 13.2 % (ref 11.5–14.5)
EST. GFR  (AFRICAN AMERICAN): 45 ML/MIN/1.73 M^2
EST. GFR  (NON AFRICAN AMERICAN): 39 ML/MIN/1.73 M^2
FERRITIN SERPL-MCNC: 249 NG/ML (ref 20–300)
GLUCOSE SERPL-MCNC: 100 MG/DL (ref 70–110)
HCT VFR BLD AUTO: 43.8 % (ref 37–48.5)
HGB BLD-MCNC: 14 G/DL (ref 12–16)
IMM GRANULOCYTES # BLD AUTO: 0.01 K/UL (ref 0–0.04)
IMM GRANULOCYTES NFR BLD AUTO: 0.2 % (ref 0–0.5)
IRON SERPL-MCNC: 94 UG/DL (ref 30–160)
LYMPHOCYTES # BLD AUTO: 1.2 K/UL (ref 1–4.8)
LYMPHOCYTES NFR BLD: 19.9 % (ref 18–48)
MCH RBC QN AUTO: 29.5 PG (ref 27–31)
MCHC RBC AUTO-ENTMCNC: 32 G/DL (ref 32–36)
MCV RBC AUTO: 92 FL (ref 82–98)
MONOCYTES # BLD AUTO: 0.5 K/UL (ref 0.3–1)
MONOCYTES NFR BLD: 8.2 % (ref 4–15)
NEUTROPHILS # BLD AUTO: 4.2 K/UL (ref 1.8–7.7)
NEUTROPHILS NFR BLD: 70.2 % (ref 38–73)
NRBC BLD-RTO: 0 /100 WBC
PLATELET # BLD AUTO: 207 K/UL (ref 150–350)
PMV BLD AUTO: 11.4 FL (ref 9.2–12.9)
POTASSIUM SERPL-SCNC: 4.1 MMOL/L (ref 3.5–5.1)
PROT SERPL-MCNC: 6.7 G/DL (ref 6–8.4)
RBC # BLD AUTO: 4.74 M/UL (ref 4–5.4)
SATURATED IRON: 28 % (ref 20–50)
SODIUM SERPL-SCNC: 141 MMOL/L (ref 136–145)
TOTAL IRON BINDING CAPACITY: 330 UG/DL (ref 250–450)
TRANSFERRIN SERPL-MCNC: 223 MG/DL (ref 200–375)
WBC # BLD AUTO: 5.94 K/UL (ref 3.9–12.7)

## 2020-06-24 PROCEDURE — 83540 ASSAY OF IRON: CPT

## 2020-06-24 PROCEDURE — 82728 ASSAY OF FERRITIN: CPT

## 2020-06-24 PROCEDURE — 80053 COMPREHEN METABOLIC PANEL: CPT

## 2020-06-24 PROCEDURE — 85025 COMPLETE CBC W/AUTO DIFF WBC: CPT

## 2020-06-24 PROCEDURE — 36415 COLL VENOUS BLD VENIPUNCTURE: CPT

## 2020-06-25 ENCOUNTER — OFFICE VISIT (OUTPATIENT)
Dept: HEMATOLOGY/ONCOLOGY | Facility: CLINIC | Age: 70
End: 2020-06-25
Payer: MEDICARE

## 2020-06-25 VITALS
TEMPERATURE: 98 F | OXYGEN SATURATION: 99 % | DIASTOLIC BLOOD PRESSURE: 65 MMHG | HEART RATE: 78 BPM | HEIGHT: 66 IN | WEIGHT: 224.63 LBS | BODY MASS INDEX: 36.1 KG/M2 | RESPIRATION RATE: 18 BRPM | SYSTOLIC BLOOD PRESSURE: 120 MMHG

## 2020-06-25 DIAGNOSIS — E66.01 MORBID OBESITY WITH BMI OF 40.0-44.9, ADULT: ICD-10-CM

## 2020-06-25 DIAGNOSIS — Z79.01 ANTICOAGULATED: ICD-10-CM

## 2020-06-25 DIAGNOSIS — D68.59 HYPERCOAGULABLE STATE: Primary | ICD-10-CM

## 2020-06-25 DIAGNOSIS — E11.9 TYPE 2 DIABETES MELLITUS WITHOUT COMPLICATION, WITHOUT LONG-TERM CURRENT USE OF INSULIN: ICD-10-CM

## 2020-06-25 PROCEDURE — G0444 DEPRESSION SCREEN ANNUAL: HCPCS | Mod: PBBFAC,PO | Performed by: INTERNAL MEDICINE

## 2020-06-25 PROCEDURE — 99497 PR ADVNCD CARE PLAN 30 MIN: ICD-10-PCS | Mod: S$PBB,,, | Performed by: INTERNAL MEDICINE

## 2020-06-25 PROCEDURE — 99497 ADVNCD CARE PLAN 30 MIN: CPT | Mod: S$PBB,,, | Performed by: INTERNAL MEDICINE

## 2020-06-25 PROCEDURE — 97802 MEDICAL NUTRITION INDIV IN: CPT | Mod: PBBFAC,PO,59 | Performed by: INTERNAL MEDICINE

## 2020-06-25 PROCEDURE — 99214 PR OFFICE/OUTPT VISIT, EST, LEVL IV, 30-39 MIN: ICD-10-PCS | Mod: S$PBB,,, | Performed by: INTERNAL MEDICINE

## 2020-06-25 PROCEDURE — 99999 PR PBB SHADOW E&M-EST. PATIENT-LVL IV: CPT | Mod: PBBFAC,,, | Performed by: INTERNAL MEDICINE

## 2020-06-25 PROCEDURE — 99497 ADVNCD CARE PLAN 30 MIN: CPT | Mod: PBBFAC,PO | Performed by: INTERNAL MEDICINE

## 2020-06-25 PROCEDURE — 99214 OFFICE O/P EST MOD 30 MIN: CPT | Mod: PBBFAC,PO,25 | Performed by: INTERNAL MEDICINE

## 2020-06-25 PROCEDURE — 99214 OFFICE O/P EST MOD 30 MIN: CPT | Mod: S$PBB,,, | Performed by: INTERNAL MEDICINE

## 2020-06-25 PROCEDURE — 99999 PR PBB SHADOW E&M-EST. PATIENT-LVL IV: ICD-10-PCS | Mod: PBBFAC,,, | Performed by: INTERNAL MEDICINE

## 2020-06-25 NOTE — PROGRESS NOTES
CC   had must surgery and I was sent back here to talk about the blood clot    Naomi Toledo is a 70 y.o.     This is a 70-year-old female who presented to the emergency room on March 10 with increasing shortness of breath and chest pain.  CT of chest was performed which revealed bilateral pulmonary emboli with moderate clot burden.  Of note was an incidental finding of a 3 mm right upper lobe lung nodule which had been there prior and was noted as stable.  Patient had been on Megace for weight loss associated with multiple sclerosis as well as interferon beta.  She currently is tolerating Eliquis 5 mg p.o. b.i.d. without any complications and no evidence of bleeding.  Ultrasound of her legs were performed revealing no evidence of thromboembolism  She was on megace for gyn purposes.     Imaging March 10, 2020 CT of chest revealed  Chart and records reviewed    Impression       1. Bilateral pulmonary emboli with moderate clot burden.  2. Trace left pleural effusion and basilar airspace disease suggesting atelectasis.      Electronically signed by: Papo Lee  Date: 03/10/2020  Time: 07:56       April 7   Pt has been checking her BP  Running 112/63 , she had one episode 95/55; She is not on any medicine for hypertension  She reports she has completely changed her diet after being labeled with diabetes she is following the [place] diabetic diet  She has lost 33 pounds in the last 3 pounds   Weight is 242 pounds     April 23, 2020  Patient experiencing new onset of what she calls helping and puffing.  She is having some shortness of breath at rest.  She has been having dysfunctional uterine bleeding since taking Eliquis 5 mg p.o. b.i.d..  She started this medicine in March and started having bleeding approximately 1-2 weeks ago.  She has seen her gyn.  Labs have been ordered to reassess her hemoglobin.  She is wearing a pad and states she feels that it is uterine bleeding as it is not increased when she urinates.   Today I will review her anticoagulant markers try to make some adjustments.  She will be seeing her gyn physician again and discussing possible ablation    May 1, 2020 patient remains with slight vaginal spotting.  She has decreased her Eliquis from 5 mg p.o. b.i.d. to 2.5 mg b.i.d. as we discussed last office visit.  I appreciate the help of her gynecologist to his going to schedule her for a D&C in ablation.  Today will review her labs which revealing stable hemoglobin yet prolonged anti 10a as expected.  CT of chest reveals that the pulmonary emboli have resolved completely although she remains with some slight small sub pulmonary nodules      June 2 2020  No further bleeding on eliquis 2.5 mg QD, to discuss when to hold eliquis prior to surgery, patient is breathing well no shortness of breath Last CT revealed resolution of pulmonary emboli anti Xa is normal    June 25, 2020  Post surgery tolerating oral anticoagulation on 2.5 makes p.o. b.i.d.  Past Medical History:   Diagnosis Date    Blood transfusion     Deep vein thrombosis     MS (multiple sclerosis)     Plantar fasciitis of left foot 2/2014     Past Surgical History:   Procedure Laterality Date    OVARIAN CYST SURGERY  1974    TUBAL LIGATION      VEIN SURGERY         Current Outpatient Medications:     apixaban (ELIQUIS) 2.5 mg Tab, Take 1 tablet (2.5 mg total) by mouth 2 (two) times daily., Disp: 60 tablet, Rfl: 0    AVONEX 30 mcg/0.5 mL PnKt, INJECT 0.5 ML (30 MCG) INTO THE MUSCLE EVERY 7 DAYS, Disp: 12 pen, Rfl: 1    blood sugar diagnostic Strp, 1 x day testing, Disp: 100 strip, Rfl: 3    blood-glucose meter (FREESTYLE FREEDOM) kit, Use as instructed, Disp: 1 each, Rfl: 0    cholecalciferol, vitamin D3, (VITAMIN D3) 5,000 unit Tab, Take 5,000 Units by mouth once daily., Disp: , Rfl:     lancets (ACCU-CHEK SOFTCLIX LANCETS) Misc, Test 1 x day, Disp: 100 each, Rfl: 3    minocycline (MINOCIN,DYNACIN) 50 MG Cap, , Disp: , Rfl:     RHOFADE 1  "% Crea, , Disp: , Rfl:     blood-glucose meter kit, Use as instructed, Disp: 1 each, Rfl: 0  Review of patient's allergies indicates:   Allergen Reactions    Morphine Itching    Niacin preparations Rash     Social History     Tobacco Use    Smoking status: Never Smoker    Smokeless tobacco: Never Used   Substance Use Topics    Alcohol use: No     Frequency: Never    Drug use: No     Family History   Problem Relation Age of Onset    Cancer Mother         breast    Breast cancer Mother 50    Hypertension Father     Cancer Sister     Hypertension Sister     Breast cancer Sister 64    Cancer Brother     Diabetes Maternal Grandmother     Leukemia Paternal Grandmother        CONSTITUTIONAL: No fevers, chills, night sweats, wt. loss, appetite changes  SKIN: no rashes or itching  ENT: No headaches, head trauma, vision changes, or eye pain  LYMPH NODES: None noticed   CV: No chest pain, palpitations.   RESP: No dyspnea on exertion, cough, wheezing, or hemoptysis  GI: No nausea, emesis, diarrhea, constipation, melena, hematochezia, pain.   : No dysuria, hematuria, urgency, or frequency   HEME:   Less easy bruising, bleeding problems  PSYCHIATRIC: No depression, anxiety, psychosis, hallucinations.  NEURO: No seizures, memory loss, dizziness or difficulty sleeping  MSK: No arthralgias or joint swelling    /65 (BP Location: Left arm, Patient Position: Sitting, BP Method: Medium (Automatic))   Pulse 78   Temp 98 °F (36.7 °C) (Temporal)   Resp 18   Ht 5' 6" (1.676 m)   Wt 101.9 kg (224 lb 10.4 oz)   SpO2 99%   BMI 36.26 kg/m²       General patient is in no distress well developed well nourished  Psych pleasant affect no evidence of depression no evidence of anxiety  Head normocephalic atraumatic, no hoarseness,  well-spoken speech intact  Eyes noninjected sclera conjunctiva appear pink  Face is symmetric  Skin intact no lesions noted no ecchymosis no rash  Neck with no limited range of motion no JVD " evident  Chest with no use of accessory muscles no labored breathing no evidence of tachypnea lungs clear bilaterally CV with regular rate and rhythm  No respiratory distress, effort normal   Abdomen appears to be nondistended  Extremities with no cyanosis no evidence of edema  Neuro muscular cranial nerves appear grossly intact  Gait appears steady  No joint effusions  Behavior normal judgment normal      Lab Results   Component Value Date    WBC 8.98 03/11/2020    HGB 14.4 03/11/2020    HCT 43.5 03/11/2020    MCV 89 03/11/2020     03/11/2020     Lab Results   Component Value Date    WBC 6.69 04/22/2020    HGB 14.1 04/22/2020    HCT 44.1 04/22/2020    MCV 92 04/22/2020     04/22/2020     Lab Results   Component Value Date    WBC 5.94 06/24/2020    HGB 14.0 06/24/2020    HCT 43.8 06/24/2020    MCV 92 06/24/2020     06/24/2020             CMP  Sodium   Date Value Ref Range Status   06/24/2020 141 136 - 145 mmol/L Final     Potassium   Date Value Ref Range Status   06/24/2020 4.1 3.5 - 5.1 mmol/L Final     Chloride   Date Value Ref Range Status   06/24/2020 106 95 - 110 mmol/L Final     CO2   Date Value Ref Range Status   06/24/2020 23 23 - 29 mmol/L Final     Glucose   Date Value Ref Range Status   06/24/2020 100 70 - 110 mg/dL Final     BUN, Bld   Date Value Ref Range Status   06/24/2020 32 (H) 8 - 23 mg/dL Final     Creatinine   Date Value Ref Range Status   06/24/2020 1.4 0.5 - 1.4 mg/dL Final     Calcium   Date Value Ref Range Status   06/24/2020 9.4 8.7 - 10.5 mg/dL Final     Total Protein   Date Value Ref Range Status   06/24/2020 6.7 6.0 - 8.4 g/dL Final     Albumin   Date Value Ref Range Status   06/24/2020 3.7 3.5 - 5.2 g/dL Final     Total Bilirubin   Date Value Ref Range Status   06/24/2020 0.6 0.1 - 1.0 mg/dL Final     Comment:     For infants and newborns, interpretation of results should be based  on gestational age, weight and in agreement with clinical  observations.  Premature  Infant recommended reference ranges:  Up to 24 hours.............<8.0 mg/dL  Up to 48 hours............<12.0 mg/dL  3-5 days..................<15.0 mg/dL  6-29 days.................<15.0 mg/dL       Alkaline Phosphatase   Date Value Ref Range Status   06/24/2020 84 55 - 135 U/L Final     AST   Date Value Ref Range Status   06/24/2020 13 10 - 40 U/L Final     ALT   Date Value Ref Range Status   06/24/2020 14 10 - 44 U/L Final     Anion Gap   Date Value Ref Range Status   06/24/2020 12 8 - 16 mmol/L Final     eGFR if    Date Value Ref Range Status   06/24/2020 45 (A) >60 mL/min/1.73 m^2 Final     eGFR if non    Date Value Ref Range Status   06/24/2020 39 (A) >60 mL/min/1.73 m^2 Final     Comment:     Calculation used to obtain the estimated glomerular filtration  rate (eGFR) is the CKD-EPI equation.        CLINICAL HISTORY:  bleeding on anticoag and repeat cta needed to assess further with bilateral PE;Other pulmonary embolism without acute cor pulmonale    TECHNIQUE:  Low dose axial images, sagittal and coronal reformations were obtained from the thoracic inlet to the lung bases following the IV administration of 100 mL of Omnipaque 350.  Contrast timing was optimized to evaluate the pulmonary arteries.  MIP images were performed.    COMPARISON:  03/10/2020    FINDINGS:  Target pulmonary nodule right upper lobe 3 mm series 3, image 190.  Additional non target pulmonary nodule measuring 3 mm right lower lobe series 3, image 233.  No filling defect in the central airways.  No pleural effusion or pneumo thorax.  Normal size heart.  No pulmonary artery filling defect to the segmental level bilaterally.  Several dystrophic calcifications and hypodense lesions in the thyroid.  No mediastinal adenopathy.  Mild multilevel degenerative changes in the spine.      Impression       Resolution of pulmonary emboli.  Unchanged pulmonary nodules.         CTA resolution of PE , nodules stable    Hypercoagulable state  -     Antithrombin III; Future; Expected date: 06/25/2020  -     DRVVT; Future; Expected date: 06/25/2020  -     Plasminogen activity; Future; Expected date: 06/25/2020  -     Protein C antigen, total; Future; Expected date: 06/25/2020  -     Protein S antigen, free; Future; Expected date: 06/25/2020  -     Prothrombin [id number] Mutation; Future; Expected date: 06/25/2020  -     Factor 5 leiden; Future; Expected date: 06/25/2020             stop eliquis  In 2-3 days proceed with inherited thrombophilia workup   rtc 2 weeks after for discussion of results  Reviewed the data pertaining to depending on any evidence of inherited thrombophilia the current standard of care for anticoagulation and the duration of such.  Imaging suggests resolution of PE and dvt  She is to abstain from any type of hormonal therapy indefinitely   She has bilateral pulmonary nodules  Stable   Once discharged her pcp can follow up on her lung nodules   Educated her on the importance of decreasing her BMI is obesity causes an increased risk of recurrent thrombotic events  Spent over 50 min discussing diet  SHE GETS HER LABS AT [facility]     Message sent to KRIS    Thank you for allowing me to evaluate and participate in the care of this pleasant patient. Please fell free to call me with any questions or concerns.    [place],  Meg Pinto MD    This note was dictated with Dragon and briefly proofread. Please excuse any sentences that may be unclear or words misspelled      Advance Care Planning     Living Will  During this visit, I engaged the patient  in the advance care planning process.  The patient and I reviewed the role for advance directives and their purpose in directing future healthcare if the patient's unable to speak for him/herself.  At this point in time, the patient does have full decision-making capacity.  We discussed different extreme health states that she could experience, and reviewed what kind of  medical care she would want in those situations.  The patient communicated that if she were comatose and had little chance of a meaningful recovery, she would want machines/life-sustaining treatments used.    I spent a total of 30  minutes engaging the patient in this advance care planning discussion.

## 2020-06-29 ENCOUNTER — LAB VISIT (OUTPATIENT)
Dept: LAB | Facility: HOSPITAL | Age: 70
End: 2020-06-29
Attending: INTERNAL MEDICINE
Payer: MEDICARE

## 2020-06-29 DIAGNOSIS — D68.59 HYPERCOAGULABLE STATE: ICD-10-CM

## 2020-06-29 PROCEDURE — 85306 CLOT INHIBIT PROT S FREE: CPT

## 2020-06-29 PROCEDURE — 85420 FIBRINOLYTIC PLASMINOGEN: CPT

## 2020-06-29 PROCEDURE — 36415 COLL VENOUS BLD VENIPUNCTURE: CPT | Mod: PO

## 2020-06-29 PROCEDURE — 85598 HEXAGNAL PHOSPH PLTLT NEUTRL: CPT

## 2020-06-29 PROCEDURE — 85613 RUSSELL VIPER VENOM DILUTED: CPT

## 2020-06-29 PROCEDURE — 85300 ANTITHROMBIN III ACTIVITY: CPT

## 2020-07-02 ENCOUNTER — LAB VISIT (OUTPATIENT)
Dept: LAB | Facility: HOSPITAL | Age: 70
End: 2020-07-02
Attending: FAMILY MEDICINE
Payer: MEDICARE

## 2020-07-02 DIAGNOSIS — N17.9 AKI (ACUTE KIDNEY INJURY): ICD-10-CM

## 2020-07-02 LAB
ANION GAP SERPL CALC-SCNC: 11 MMOL/L (ref 8–16)
AT III ACT/NOR PPP CHRO: 89 % (ref 83–118)
BUN SERPL-MCNC: 30 MG/DL (ref 8–23)
CALCIUM SERPL-MCNC: 9.5 MG/DL (ref 8.7–10.5)
CHLORIDE SERPL-SCNC: 104 MMOL/L (ref 95–110)
CO2 SERPL-SCNC: 24 MMOL/L (ref 23–29)
CREAT SERPL-MCNC: 1 MG/DL (ref 0.5–1.4)
EST. GFR  (AFRICAN AMERICAN): >60 ML/MIN/1.73 M^2
EST. GFR  (NON AFRICAN AMERICAN): 58 ML/MIN/1.73 M^2
F2 GENE MUT ANL BLD/T: NORMAL
F5 P.R506Q BLD/T QL: NORMAL
GLUCOSE SERPL-MCNC: 92 MG/DL (ref 70–110)
POTASSIUM SERPL-SCNC: 4 MMOL/L (ref 3.5–5.1)
SODIUM SERPL-SCNC: 139 MMOL/L (ref 136–145)

## 2020-07-02 PROCEDURE — 36415 COLL VENOUS BLD VENIPUNCTURE: CPT | Mod: PO

## 2020-07-02 PROCEDURE — 81240 F2 GENE: CPT

## 2020-07-02 PROCEDURE — 80048 BASIC METABOLIC PNL TOTAL CA: CPT

## 2020-07-02 PROCEDURE — 81241 F5 GENE: CPT

## 2020-07-03 ENCOUNTER — PATIENT MESSAGE (OUTPATIENT)
Dept: FAMILY MEDICINE | Facility: CLINIC | Age: 70
End: 2020-07-03

## 2020-07-03 LAB — PLG ACT/NOR PPP CHRO: 83 % (ref 71–144)

## 2020-07-04 PROBLEM — I26.99 BILATERAL PULMONARY EMBOLISM: Status: RESOLVED | Noted: 2020-03-10 | Resolved: 2020-07-04

## 2020-07-06 LAB — PROT C AG ACT/NOR PPP IA: 110 % (ref 70–150)

## 2020-07-07 LAB — PROT S FREE AG ACT/NOR PPP IA: 116 % (ref 50–147)

## 2020-07-10 LAB — APTT HEX PL PPP: NEGATIVE S

## 2020-07-14 LAB — LA PPP-IMP: NEGATIVE

## 2020-07-17 ENCOUNTER — OFFICE VISIT (OUTPATIENT)
Dept: HEMATOLOGY/ONCOLOGY | Facility: CLINIC | Age: 70
End: 2020-07-17
Payer: MEDICARE

## 2020-07-17 VITALS
HEART RATE: 75 BPM | BODY MASS INDEX: 35.62 KG/M2 | DIASTOLIC BLOOD PRESSURE: 58 MMHG | SYSTOLIC BLOOD PRESSURE: 113 MMHG | RESPIRATION RATE: 18 BRPM | TEMPERATURE: 98 F | WEIGHT: 220.69 LBS | OXYGEN SATURATION: 98 %

## 2020-07-17 DIAGNOSIS — R76.8 POSITIVE ANA (ANTINUCLEAR ANTIBODY): Primary | ICD-10-CM

## 2020-07-17 DIAGNOSIS — R91.8 PULMONARY NODULES: ICD-10-CM

## 2020-07-17 DIAGNOSIS — G35 MS (MULTIPLE SCLEROSIS): ICD-10-CM

## 2020-07-17 PROCEDURE — 99214 PR OFFICE/OUTPT VISIT, EST, LEVL IV, 30-39 MIN: ICD-10-PCS | Mod: S$PBB,,, | Performed by: INTERNAL MEDICINE

## 2020-07-17 PROCEDURE — 99999 PR PBB SHADOW E&M-EST. PATIENT-LVL III: ICD-10-PCS | Mod: PBBFAC,,, | Performed by: INTERNAL MEDICINE

## 2020-07-17 PROCEDURE — 99213 OFFICE O/P EST LOW 20 MIN: CPT | Mod: PBBFAC,PO | Performed by: INTERNAL MEDICINE

## 2020-07-17 PROCEDURE — 99999 PR PBB SHADOW E&M-EST. PATIENT-LVL III: CPT | Mod: PBBFAC,,, | Performed by: INTERNAL MEDICINE

## 2020-07-17 PROCEDURE — 99214 OFFICE O/P EST MOD 30 MIN: CPT | Mod: S$PBB,,, | Performed by: INTERNAL MEDICINE

## 2020-07-17 NOTE — PROGRESS NOTES
CC   I am nervous about my labs    Naomi Toledo is a 70 y.o.     This is a 70-year-old female who presented to the emergency room on March 10 with increasing shortness of breath and chest pain.  CT of chest was performed which revealed bilateral pulmonary emboli with moderate clot burden.  Of note was an incidental finding of a 3 mm right upper lobe lung nodule which had been there prior and was noted as stable.  Patient had been on Megace for weight loss associated with multiple sclerosis as well as interferon beta.  She currently is tolerating Eliquis 5 mg p.o. b.i.d. without any complications and no evidence of bleeding.  Ultrasound of her legs were performed revealing no evidence of thromboembolism  She was on megace for gyn purposes.     Imaging March 10, 2020 CT of chest revealed  Chart and records reviewed    Impression       1. Bilateral pulmonary emboli with moderate clot burden.  2. Trace left pleural effusion and basilar airspace disease suggesting atelectasis.      Electronically signed by: Papo Lee  Date: 03/10/2020  Time: 07:56       April 7   Pt has been checking her BP  Running 112/63 , she had one episode 95/55; She is not on any medicine for hypertension  She reports she has completely changed her diet after being labeled with diabetes she is following the [place] diabetic diet  She has lost 33 pounds in the last 3 pounds   Weight is 242 pounds     April 23, 2020  Patient experiencing new onset of what she calls helping and puffing.  She is having some shortness of breath at rest.  She has been having dysfunctional uterine bleeding since taking Eliquis 5 mg p.o. b.i.d..  She started this medicine in March and started having bleeding approximately 1-2 weeks ago.  She has seen her gyn.  Labs have been ordered to reassess her hemoglobin.  She is wearing a pad and states she feels that it is uterine bleeding as it is not increased when she urinates.  Today I will review her anticoagulant  markers try to make some adjustments.  She will be seeing her gyn physician again and discussing possible ablation    May 1, 2020 patient remains with slight vaginal spotting.  She has decreased her Eliquis from 5 mg p.o. b.i.d. to 2.5 mg b.i.d. as we discussed last office visit.  I appreciate the help of her gynecologist to his going to schedule her for a D&C in ablation.  Today will review her labs which revealing stable hemoglobin yet prolonged anti 10a as expected.  CT of chest reveals that the pulmonary emboli have resolved completely although she remains with some slight small sub pulmonary nodules      June 2 2020  No further bleeding on eliquis 2.5 mg QD, to discuss when to hold eliquis prior to surgery, patient is breathing well no shortness of breath Last CT revealed resolution of pulmonary emboli anti Xa is normal    June 25, 2020  Post surgery tolerating oral anticoagulation on 2.5 makes p.o. b.i.d.    July 2020 here for lab results  Past Medical History:   Diagnosis Date    Blood transfusion     Deep vein thrombosis     MS (multiple sclerosis)     Plantar fasciitis of left foot 2/2014     Past Surgical History:   Procedure Laterality Date    OVARIAN CYST SURGERY  1974    TUBAL LIGATION      VEIN SURGERY         Current Outpatient Medications:     AVONEX 30 mcg/0.5 mL PnKt, INJECT 0.5 ML (30 MCG) INTO THE MUSCLE EVERY 7 DAYS, Disp: 12 pen, Rfl: 1    blood sugar diagnostic Strp, 1 x day testing, Disp: 100 strip, Rfl: 3    blood-glucose meter (FREESTYLE FREEDOM) kit, Use as instructed, Disp: 1 each, Rfl: 0    cholecalciferol, vitamin D3, (VITAMIN D3) 5,000 unit Tab, Take 5,000 Units by mouth once daily., Disp: , Rfl:     lancets (ACCU-CHEK SOFTCLIX LANCETS) Misc, Test 1 x day, Disp: 100 each, Rfl: 3    minocycline (MINOCIN,DYNACIN) 50 MG Cap, , Disp: , Rfl:     RHOFADE 1 % Crea, , Disp: , Rfl:     blood-glucose meter kit, Use as instructed, Disp: 1 each, Rfl: 0  Review of patient's allergies  indicates:   Allergen Reactions    Megace [megestrol]      Precipitated thrombotic event     Morphine Itching    Niacin preparations Rash     Social History     Tobacco Use    Smoking status: Never Smoker    Smokeless tobacco: Never Used   Substance Use Topics    Alcohol use: No     Frequency: Never    Drug use: No     Family History   Problem Relation Age of Onset    Cancer Mother         breast    Breast cancer Mother 50    Hypertension Father     Cancer Sister     Hypertension Sister     Breast cancer Sister 64    Cancer Brother     Diabetes Maternal Grandmother     Leukemia Paternal Grandmother        CONSTITUTIONAL: No fevers, chills, night sweats, wt. loss, appetite changes  SKIN: no rashes or itching  ENT: No headaches, head trauma, vision changes, or eye pain  LYMPH NODES: None noticed   CV: No chest pain, palpitations.   RESP: No dyspnea on exertion, cough, wheezing, or hemoptysis  GI: No nausea, emesis, diarrhea, constipation, melena, hematochezia, pain.   : No dysuria, hematuria, urgency, or frequency   HEME:   Less easy bruising, bleeding problems  PSYCHIATRIC: No depression, anxiety, psychosis, hallucinations.  NEURO: No seizures, memory loss, dizziness or difficulty sleeping  MSK: No arthralgias or joint swelling    BP (!) 113/58 (BP Location: Right arm, Patient Position: Sitting, BP Method: Large (Automatic))   Pulse 75   Temp 98.4 °F (36.9 °C) (Temporal)   Resp 18   Wt 100.1 kg (220 lb 10.9 oz)   SpO2 98%   BMI 35.62 kg/m²       General patient is in no distress well developed well nourished  Psych pleasant affect no evidence of depression no evidence of anxiety  Head normocephalic atraumatic, no hoarseness,  well-spoken speech intact  Eyes noninjected sclera conjunctiva appear pink  Face is symmetric  Skin intact no lesions noted no ecchymosis no rash  Neck with no limited range of motion no JVD evident  Chest with no use of accessory muscles no labored breathing no evidence  of tachypnea lungs clear bilaterally CV with regular rate and rhythm  No respiratory distress, effort normal   Abdomen appears to be nondistended  Extremities with no cyanosis no evidence of edema  Neuro muscular cranial nerves appear grossly intact  Gait appears steady  No joint effusions  Behavior normal judgment normal      Lab Results     Lab Results   Component Value Date    WBC 5.94 06/24/2020    HGB 14.0 06/24/2020    HCT 43.8 06/24/2020    MCV 92 06/24/2020     06/24/2020     CMP  Sodium   Date Value Ref Range Status   07/02/2020 139 136 - 145 mmol/L Final     Potassium   Date Value Ref Range Status   07/02/2020 4.0 3.5 - 5.1 mmol/L Final     Chloride   Date Value Ref Range Status   07/02/2020 104 95 - 110 mmol/L Final     CO2   Date Value Ref Range Status   07/02/2020 24 23 - 29 mmol/L Final     Glucose   Date Value Ref Range Status   07/02/2020 92 70 - 110 mg/dL Final     BUN, Bld   Date Value Ref Range Status   07/02/2020 30 (H) 8 - 23 mg/dL Final     Creatinine   Date Value Ref Range Status   07/02/2020 1.0 0.5 - 1.4 mg/dL Final     Calcium   Date Value Ref Range Status   07/02/2020 9.5 8.7 - 10.5 mg/dL Final     Total Protein   Date Value Ref Range Status   06/24/2020 6.7 6.0 - 8.4 g/dL Final     Albumin   Date Value Ref Range Status   06/24/2020 3.7 3.5 - 5.2 g/dL Final     Total Bilirubin   Date Value Ref Range Status   06/24/2020 0.6 0.1 - 1.0 mg/dL Final     Comment:     For infants and newborns, interpretation of results should be based  on gestational age, weight and in agreement with clinical  observations.  Premature Infant recommended reference ranges:  Up to 24 hours.............<8.0 mg/dL  Up to 48 hours............<12.0 mg/dL  3-5 days..................<15.0 mg/dL  6-29 days.................<15.0 mg/dL       Alkaline Phosphatase   Date Value Ref Range Status   06/24/2020 84 55 - 135 U/L Final     AST   Date Value Ref Range Status   06/24/2020 13 10 - 40 U/L Final     ALT   Date Value  Ref Range Status   06/24/2020 14 10 - 44 U/L Final     Anion Gap   Date Value Ref Range Status   07/02/2020 11 8 - 16 mmol/L Final     eGFR if    Date Value Ref Range Status   07/02/2020 >60.0 >60 mL/min/1.73 m^2 Final     eGFR if non    Date Value Ref Range Status   07/02/2020 58.0 (A) >60 mL/min/1.73 m^2 Final     Comment:     Calculation used to obtain the estimated glomerular filtration  rate (eGFR) is the CKD-EPI equation.        CLINICAL HISTORY:  bleeding on anticoag and repeat cta needed to assess further with bilateral PE;Other pulmonary embolism without acute cor pulmonale    TECHNIQUE:  Low dose axial images, sagittal and coronal reformations were obtained from the thoracic inlet to the lung bases following the IV administration of 100 mL of Omnipaque 350.  Contrast timing was optimized to evaluate the pulmonary arteries.  MIP images were performed.    COMPARISON:  03/10/2020    FINDINGS:  Target pulmonary nodule right upper lobe 3 mm series 3, image 190.  Additional non target pulmonary nodule measuring 3 mm right lower lobe series 3, image 233.  No filling defect in the central airways.  No pleural effusion or pneumo thorax.  Normal size heart.  No pulmonary artery filling defect to the segmental level bilaterally.  Several dystrophic calcifications and hypodense lesions in the thyroid.  No mediastinal adenopathy.  Mild multilevel degenerative changes in the spine.      Impression       Resolution of pulmonary emboli.  Unchanged pulmonary nodules.         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 Please fell free to call me with any questions or concerns.    Warmly,  Meg Pinto MD    This note was dictated with Dragon and briefly proofread. Please excuse any sentences that may be unclear or words misspelled      Advance Care Planning     Living Will  During this visit, I engaged the patient  in the advance care planning process.  The patient and I reviewed the role for advance directives and their purpose in directing future healthcare if the patient's unable to speak for him/herself.  At this point in time, the patient does have full decision-making capacity.  We discussed different extreme health states that she could experience, and reviewed what kind of medical care she would want in those situations.  The patient communicated that if she were comatose and had little chance of a meaningful recovery, she would want machines/life-sustaining treatments used.    I spent a total of 30  minutes engaging the patient in this advance care planning discussion.             CC   had must surgery and I was sent back here to talk about the blood clot    Naomi Toledo is a 70 y.o.     This is a 70-year-old female who presented to the emergency room on March 10 with increasing shortness of breath and chest pain.  CT of chest was performed which revealed bilateral pulmonary emboli with moderate clot burden.  Of note was an incidental finding of a 3 mm right upper lobe lung nodule which had been there prior and was noted as stable.  Patient had been on Megace for weight loss associated with multiple sclerosis as well as interferon beta.  She currently is tolerating Eliquis 5 mg p.o. b.i.d. without any complications and no evidence of bleeding.  Ultrasound of her legs were performed revealing no  evidence of thromboembolism  She was on megace for gyn purposes.     Imaging March 10, 2020 CT of chest revealed  Chart and records reviewed    Impression       1. Bilateral pulmonary emboli with moderate clot burden.  2. Trace left pleural effusion and basilar airspace disease suggesting atelectasis.      Electronically signed by: Papo Lee  Date: 03/10/2020  Time: 07:56       April 7   Pt has been checking her BP  Running 112/63 , she had one episode 95/55; She is not on any medicine for hypertension  She reports she has completely changed her diet after being labeled with diabetes she is following the [place] diabetic diet  She has lost 33 pounds in the last 3 pounds   Weight is 242 pounds     April 23, 2020  Patient experiencing new onset of what she calls helping and puffing.  She is having some shortness of breath at rest.  She has been having dysfunctional uterine bleeding since taking Eliquis 5 mg p.o. b.i.d..  She started this medicine in March and started having bleeding approximately 1-2 weeks ago.  She has seen her gyn.  Labs have been ordered to reassess her hemoglobin.  She is wearing a pad and states she feels that it is uterine bleeding as it is not increased when she urinates.  Today I will review her anticoagulant markers try to make some adjustments.  She will be seeing her gyn physician again and discussing possible ablation    May 1, 2020 patient remains with slight vaginal spotting.  She has decreased her Eliquis from 5 mg p.o. b.i.d. to 2.5 mg b.i.d. as we discussed last office visit.  I appreciate the help of her gynecologist to his going to schedule her for a D&C in ablation.  Today will review her labs which revealing stable hemoglobin yet prolonged anti 10a as expected.  CT of chest reveals that the pulmonary emboli have resolved completely although she remains with some slight small sub pulmonary nodules      June 2 2020  No further bleeding on eliquis 2.5 mg QD, to discuss when to  hold eliquis prior to surgery, patient is breathing well no shortness of breath Last CT revealed resolution of pulmonary emboli anti Xa is normal    June 25, 2020  Post surgery tolerating oral anticoagulation on 2.5 makes p.o. b.i.d.  Past Medical History:   Diagnosis Date    Blood transfusion     Deep vein thrombosis     MS (multiple sclerosis)     Plantar fasciitis of left foot 2/2014     Past Surgical History:   Procedure Laterality Date    OVARIAN CYST SURGERY  1974    TUBAL LIGATION      VEIN SURGERY         Current Outpatient Medications:     AVONEX 30 mcg/0.5 mL PnKt, INJECT 0.5 ML (30 MCG) INTO THE MUSCLE EVERY 7 DAYS, Disp: 12 pen, Rfl: 1    blood sugar diagnostic Strp, 1 x day testing, Disp: 100 strip, Rfl: 3    blood-glucose meter (FREESTYLE FREEDOM) kit, Use as instructed, Disp: 1 each, Rfl: 0    cholecalciferol, vitamin D3, (VITAMIN D3) 5,000 unit Tab, Take 5,000 Units by mouth once daily., Disp: , Rfl:     lancets (ACCU-CHEK SOFTCLIX LANCETS) Misc, Test 1 x day, Disp: 100 each, Rfl: 3    minocycline (MINOCIN,DYNACIN) 50 MG Cap, , Disp: , Rfl:     RHOFADE 1 % Crea, , Disp: , Rfl:     blood-glucose meter kit, Use as instructed, Disp: 1 each, Rfl: 0  Review of patient's allergies indicates:   Allergen Reactions    Megace [megestrol]      Precipitated thrombotic event     Morphine Itching    Niacin preparations Rash     Social History     Tobacco Use    Smoking status: Never Smoker    Smokeless tobacco: Never Used   Substance Use Topics    Alcohol use: No     Frequency: Never    Drug use: No     Family History   Problem Relation Age of Onset    Cancer Mother         breast    Breast cancer Mother 50    Hypertension Father     Cancer Sister     Hypertension Sister     Breast cancer Sister 64    Cancer Brother     Diabetes Maternal Grandmother     Leukemia Paternal Grandmother        CONSTITUTIONAL: No fevers, chills, night sweats, wt. loss, appetite changes  SKIN: no rashes or  itching  ENT: No headaches, head trauma, vision changes, or eye pain  LYMPH NODES: None noticed   CV: No chest pain, palpitations.   RESP: No dyspnea on exertion, cough, wheezing, or hemoptysis  GI: No nausea, emesis, diarrhea, constipation, melena, hematochezia, pain.   : No dysuria, hematuria, urgency, or frequency   HEME:   Less easy bruising, bleeding problems  PSYCHIATRIC: No depression, anxiety, psychosis, hallucinations.  NEURO: No seizures, memory loss, dizziness or difficulty sleeping  MSK: No arthralgias or joint swelling    BP (!) 113/58 (BP Location: Right arm, Patient Position: Sitting, BP Method: Large (Automatic))   Pulse 75   Temp 98.4 °F (36.9 °C) (Temporal)   Resp 18   Wt 100.1 kg (220 lb 10.9 oz)   SpO2 98%   BMI 35.62 kg/m²       General patient is in no distress well developed well nourished  Psych pleasant affect no evidence of depression no evidence of anxiety  Head normocephalic atraumatic, no hoarseness,  well-spoken speech intact  Eyes noninjected sclera conjunctiva appear pink  Face is symmetric  Skin intact no lesions noted no ecchymosis no rash  Neck with no limited range of motion no JVD evident  Chest with no use of accessory muscles no labored breathing no evidence of tachypnea lungs clear bilaterally CV with regular rate and rhythm  No respiratory distress, effort normal   Abdomen appears to be nondistended  Extremities with no cyanosis no evidence of edema  Neuro muscular cranial nerves appear grossly intact  Gait appears steady  No joint effusions  Behavior normal judgment normal      Lab Results   Component Value Date    WBC 8.98 03/11/2020    HGB 14.4 03/11/2020    HCT 43.5 03/11/2020    MCV 89 03/11/2020     03/11/2020     Lab Results   Component Value Date    WBC 6.69 04/22/2020    HGB 14.1 04/22/2020    HCT 44.1 04/22/2020    MCV 92 04/22/2020     04/22/2020     Lab Results   Component Value Date    WBC 5.94 06/24/2020    HGB 14.0 06/24/2020    HCT 43.8  06/24/2020    MCV 92 06/24/2020     06/24/2020             CMP  Sodium   Date Value Ref Range Status   07/02/2020 139 136 - 145 mmol/L Final     Potassium   Date Value Ref Range Status   07/02/2020 4.0 3.5 - 5.1 mmol/L Final     Chloride   Date Value Ref Range Status   07/02/2020 104 95 - 110 mmol/L Final     CO2   Date Value Ref Range Status   07/02/2020 24 23 - 29 mmol/L Final     Glucose   Date Value Ref Range Status   07/02/2020 92 70 - 110 mg/dL Final     BUN, Bld   Date Value Ref Range Status   07/02/2020 30 (H) 8 - 23 mg/dL Final     Creatinine   Date Value Ref Range Status   07/02/2020 1.0 0.5 - 1.4 mg/dL Final     Calcium   Date Value Ref Range Status   07/02/2020 9.5 8.7 - 10.5 mg/dL Final     Total Protein   Date Value Ref Range Status   06/24/2020 6.7 6.0 - 8.4 g/dL Final     Albumin   Date Value Ref Range Status   06/24/2020 3.7 3.5 - 5.2 g/dL Final     Total Bilirubin   Date Value Ref Range Status   06/24/2020 0.6 0.1 - 1.0 mg/dL Final     Comment:     For infants and newborns, interpretation of results should be based  on gestational age, weight and in agreement with clinical  observations.  Premature Infant recommended reference ranges:  Up to 24 hours.............<8.0 mg/dL  Up to 48 hours............<12.0 mg/dL  3-5 days..................<15.0 mg/dL  6-29 days.................<15.0 mg/dL       Alkaline Phosphatase   Date Value Ref Range Status   06/24/2020 84 55 - 135 U/L Final     AST   Date Value Ref Range Status   06/24/2020 13 10 - 40 U/L Final     ALT   Date Value Ref Range Status   06/24/2020 14 10 - 44 U/L Final     Anion Gap   Date Value Ref Range Status   07/02/2020 11 8 - 16 mmol/L Final     eGFR if    Date Value Ref Range Status   07/02/2020 >60.0 >60 mL/min/1.73 m^2 Final     eGFR if non    Date Value Ref Range Status   07/02/2020 58.0 (A) >60 mL/min/1.73 m^2 Final     Comment:     Calculation used to obtain the estimated glomerular  filtration  rate (eGFR) is the CKD-EPI equation.        CLINICAL HISTORY:  bleeding on anticoag and repeat cta needed to assess further with bilateral PE;Other pulmonary embolism without acute cor pulmonale    TECHNIQUE:  Low dose axial images, sagittal and coronal reformations were obtained from the thoracic inlet to the lung bases following the IV administration of 100 mL of Omnipaque 350.  Contrast timing was optimized to evaluate the pulmonary arteries.  MIP images were performed.    COMPARISON:  03/10/2020    FINDINGS:  Target pulmonary nodule right upper lobe 3 mm series 3, image 190.  Additional non target pulmonary nodule measuring 3 mm right lower lobe series 3, image 233.  No filling defect in the central airways.  No pleural effusion or pneumo thorax.  Normal size heart.  No pulmonary artery filling defect to the segmental level bilaterally.  Several dystrophic calcifications and hypodense lesions in the thyroid.  No mediastinal adenopathy.  Mild multilevel degenerative changes in the spine.      Impression       Resolution of pulmonary emboli.  Unchanged pulmonary nodules.         CTA resolution of PE , nodules stable   There are no diagnoses linked to this encounter.         stop eliquis  In 2-3 days proceed with inherited thrombophilia workup   rtc 2 weeks after for discussion of results  Reviewed the data pertaining to depending on any evidence of inherited thrombophilia the current standard of care for anticoagulation and the duration of such.  Imaging suggests resolution of PE and dvt  She is to abstain from any type of hormonal therapy indefinitely   She has bilateral pulmonary nodules  Stable   Once discharged her pcp can follow up on her lung nodules   Educated her on the importance of decreasing her BMI is obesity causes an increased risk of recurrent thrombotic events  Spent over 50 min discussing diet  SHE GETS HER LABS AT [facility]     Message sent to KRIS    Thank you for allowing me to  evaluate and participate in the care of this pleasant patient. Please fell free to call me with any questions or concerns.    Warmly,  Meg Pinto MD    This note was dictated with Dragon and briefly proofread. Please excuse any sentences that may be unclear or words misspelled      Advance Care Planning     Living Will  During this visit, I engaged the patient  in the advance care planning process.  The patient and I reviewed the role for advance directives and their purpose in directing future healthcare if the patient's unable to speak for him/herself.  At this point in time, the patient does have full decision-making capacity.  We discussed different extreme health states that she could experience, and reviewed what kind of medical care she would want in those situations.  The patient communicated that if she were comatose and had little chance of a meaningful recovery, she would want machines/life-sustaining treatments used.    I spent a total of 30  minutes engaging the patient in this advance care planning discussion.

## 2020-08-21 ENCOUNTER — OFFICE VISIT (OUTPATIENT)
Dept: NEUROLOGY | Facility: CLINIC | Age: 70
End: 2020-08-21
Payer: MEDICARE

## 2020-08-21 ENCOUNTER — LAB VISIT (OUTPATIENT)
Dept: LAB | Facility: HOSPITAL | Age: 70
End: 2020-08-21
Attending: FAMILY MEDICINE
Payer: MEDICARE

## 2020-08-21 ENCOUNTER — PATIENT OUTREACH (OUTPATIENT)
Dept: ADMINISTRATIVE | Facility: HOSPITAL | Age: 70
End: 2020-08-21

## 2020-08-21 DIAGNOSIS — G35 MULTIPLE SCLEROSIS: Primary | ICD-10-CM

## 2020-08-21 DIAGNOSIS — R20.8 VIBRATION SENSORY LOSS: ICD-10-CM

## 2020-08-21 DIAGNOSIS — N31.9 NEUROGENIC BLADDER: ICD-10-CM

## 2020-08-21 DIAGNOSIS — E11.9 TYPE 2 DIABETES MELLITUS WITHOUT COMPLICATION, WITHOUT LONG-TERM CURRENT USE OF INSULIN: ICD-10-CM

## 2020-08-21 DIAGNOSIS — G35 MULTIPLE SCLEROSIS: ICD-10-CM

## 2020-08-21 DIAGNOSIS — E55.9 VITAMIN D INSUFFICIENCY: ICD-10-CM

## 2020-08-21 DIAGNOSIS — G35 MS (MULTIPLE SCLEROSIS): ICD-10-CM

## 2020-08-21 DIAGNOSIS — Z71.89 COUNSELING REGARDING GOALS OF CARE: ICD-10-CM

## 2020-08-21 DIAGNOSIS — Z79.899 ENCOUNTER FOR LONG-TERM (CURRENT) USE OF HIGH-RISK MEDICATION: ICD-10-CM

## 2020-08-21 LAB
25(OH)D3+25(OH)D2 SERPL-MCNC: 77 NG/ML (ref 30–96)
ALBUMIN SERPL BCP-MCNC: 3.8 G/DL (ref 3.5–5.2)
ALP SERPL-CCNC: 85 U/L (ref 55–135)
ALT SERPL W/O P-5'-P-CCNC: 11 U/L (ref 10–44)
ANION GAP SERPL CALC-SCNC: 7 MMOL/L (ref 8–16)
AST SERPL-CCNC: 14 U/L (ref 10–40)
BASOPHILS # BLD AUTO: 0.02 K/UL (ref 0–0.2)
BASOPHILS NFR BLD: 0.4 % (ref 0–1.9)
BILIRUB SERPL-MCNC: 0.3 MG/DL (ref 0.1–1)
BUN SERPL-MCNC: 27 MG/DL (ref 8–23)
CALCIUM SERPL-MCNC: 9.1 MG/DL (ref 8.7–10.5)
CHLORIDE SERPL-SCNC: 106 MMOL/L (ref 95–110)
CO2 SERPL-SCNC: 27 MMOL/L (ref 23–29)
CREAT SERPL-MCNC: 1 MG/DL (ref 0.5–1.4)
DIFFERENTIAL METHOD: ABNORMAL
EOSINOPHIL # BLD AUTO: 0.1 K/UL (ref 0–0.5)
EOSINOPHIL NFR BLD: 2.3 % (ref 0–8)
ERYTHROCYTE [DISTWIDTH] IN BLOOD BY AUTOMATED COUNT: 12.3 % (ref 11.5–14.5)
EST. GFR  (AFRICAN AMERICAN): >60 ML/MIN/1.73 M^2
EST. GFR  (NON AFRICAN AMERICAN): 58 ML/MIN/1.73 M^2
ESTIMATED AVG GLUCOSE: 114 MG/DL (ref 68–131)
GLUCOSE SERPL-MCNC: 128 MG/DL (ref 70–110)
HBA1C MFR BLD HPLC: 5.6 % (ref 4–5.6)
HCT VFR BLD AUTO: 45.8 % (ref 37–48.5)
HGB BLD-MCNC: 14.2 G/DL (ref 12–16)
IMM GRANULOCYTES # BLD AUTO: 0.02 K/UL (ref 0–0.04)
IMM GRANULOCYTES NFR BLD AUTO: 0.4 % (ref 0–0.5)
LYMPHOCYTES # BLD AUTO: 1.1 K/UL (ref 1–4.8)
LYMPHOCYTES NFR BLD: 18.8 % (ref 18–48)
MCH RBC QN AUTO: 30 PG (ref 27–31)
MCHC RBC AUTO-ENTMCNC: 31 G/DL (ref 32–36)
MCV RBC AUTO: 97 FL (ref 82–98)
MONOCYTES # BLD AUTO: 0.4 K/UL (ref 0.3–1)
MONOCYTES NFR BLD: 7.6 % (ref 4–15)
NEUTROPHILS # BLD AUTO: 4 K/UL (ref 1.8–7.7)
NEUTROPHILS NFR BLD: 70.5 % (ref 38–73)
NRBC BLD-RTO: 0 /100 WBC
PLATELET # BLD AUTO: 196 K/UL (ref 150–350)
PMV BLD AUTO: 11.9 FL (ref 9.2–12.9)
POTASSIUM SERPL-SCNC: 3.9 MMOL/L (ref 3.5–5.1)
PROT SERPL-MCNC: 6.6 G/DL (ref 6–8.4)
RBC # BLD AUTO: 4.74 M/UL (ref 4–5.4)
SODIUM SERPL-SCNC: 140 MMOL/L (ref 136–145)
WBC # BLD AUTO: 5.65 K/UL (ref 3.9–12.7)

## 2020-08-21 PROCEDURE — 99214 OFFICE O/P EST MOD 30 MIN: CPT | Mod: 95,,, | Performed by: PHYSICIAN ASSISTANT

## 2020-08-21 PROCEDURE — 85025 COMPLETE CBC W/AUTO DIFF WBC: CPT

## 2020-08-21 PROCEDURE — 83036 HEMOGLOBIN GLYCOSYLATED A1C: CPT

## 2020-08-21 PROCEDURE — 99214 PR OFFICE/OUTPT VISIT, EST, LEVL IV, 30-39 MIN: ICD-10-PCS | Mod: 95,,, | Performed by: PHYSICIAN ASSISTANT

## 2020-08-21 PROCEDURE — 80053 COMPREHEN METABOLIC PANEL: CPT

## 2020-08-21 PROCEDURE — 82306 VITAMIN D 25 HYDROXY: CPT

## 2020-08-21 PROCEDURE — 36415 COLL VENOUS BLD VENIPUNCTURE: CPT | Mod: PO

## 2020-08-21 RX ORDER — NAPROXEN SODIUM 220 MG/1
81 TABLET, FILM COATED ORAL DAILY
COMMUNITY

## 2020-08-21 NOTE — PROGRESS NOTES
Subjective:          Patient ID: Naomi Toledo is a 70 y.o. female who presents today for a routine clinic visit for MS.  Last visit in April 2020 with Dr. Roth.     MS HPI:  · DMT: interferon beta-1a IM  · Side effects from DMT? No  · Taking vitamin D3 as recommended? Yes - 5,000IU/d   · Feeling stable, no new symptoms  · Orignial MS Diagnosis in 2002 by Dr. MRAI Tapia  · Initial visit with [facility] in 2014  · Tremor continue in L hand-perhaps a little worse  · PE in March hospitalized over night  · Patient is walking miles each day and changed diet(low carb/high carb)-she has lost significant weight with this regime. She is not on Diabetes medication and her A1C is well controlled now  · Numbness in hands worse on L than R and worse in finger tips, but no overall change from prior    The patient location is: home  The chief complaint leading to consultation is: MS follow up    Visit type: audiovisual    Face to Face time with patient: 22  30 minutes of total time spent on the encounter, which includes face to face time and non-face to face time preparing to see the patient (eg, review of tests), Obtaining and/or reviewing separately obtained history, Documenting clinical information in the electronic or other health record, Independently interpreting results (not separately reported) and communicating results to the patient/family/caregiver, or Care coordination (not separately reported).         Each patient to whom he or she provides medical services by telemedicine is:  (1) informed of the relationship between the physician and patient and the respective role of any other health care provider with respect to management of the patient; and (2) notified that he or she may decline to receive medical services by telemedicine and may withdraw from such care at any time.    Notes:     Patient will be moving to [facility]-she would like a referral to [facility]--Moving middle of September   Medications:  Current  Outpatient Medications   Medication Sig    AVONEX 30 mcg/0.5 mL PnKt INJECT 0.5 ML (30 MCG) INTO THE MUSCLE EVERY 7 DAYS    blood sugar diagnostic Strp 1 x day testing    blood-glucose meter (FREESTYLE FREEDOM) kit Use as instructed    blood-glucose meter kit Use as instructed    cholecalciferol, vitamin D3, (VITAMIN D3) 5,000 unit Tab Take 5,000 Units by mouth once daily.    lancets (ACCU-CHEK SOFTCLIX LANCETS) Misc Test 1 x day    minocycline (MINOCIN,DYNACIN) 50 MG Cap     RHOFADE 1 % Crea      No current facility-administered medications for this visit.        SOCIAL HISTORY  Social History     Tobacco Use    Smoking status: Never Smoker    Smokeless tobacco: Never Used   Substance Use Topics    Alcohol use: No     Frequency: Never    Drug use: No       Living arrangements - the patient lives with their spouse.    ROS:    REVIEW OF SYMPTOMS 8/21/2020   Do you feel abnormally tired on most days? No   Do you feel you generally sleep well? Yes   Do you have difficulty controlling your bladder?  No-some urgency   Do you have difficulty controlling your bowels?  No-much less urgency since she has eliminated artifical sweetner   Do you have frequent muscle cramps, tightness or spasms in your limbs?  No   Do you have new visual symptoms?  No   Do you have worsening difficulty with your memory or thinking? No   Do you have worsening symptoms of anxiety or depression?  No   For patients who walk, Do you have more difficulty walking?  No   Have you fallen since your last visit?  No   For patients who use wheelchairs: Do you have any skin wounds or breakdown? Not Applicable   Do you have difficulty using your hands?  Yes-stable from prior-continue numbness as noted above   Do you have shooting or burning pain? No   Do you have difficulty with sexual function?  -   If you are sexually active, are you using birth control? Y/N  N/A Not Applicable   Do you often choke when swallowing liquids or solid food?  No   Do  you experience worsening symptoms when overheated? No   Do you need any new equipment such as a wheelchair, walker or shower chair? No   Do you receive co-pay financial assistance for your principal MS medicine? No-feels  for life is covering-they pay 50$ for 3 month supply   Would you be interested in participating in an MS research trial in the future? Not Applicable-moving   For patients on Gilenya, Tecfidera, Aubagio, Rituxan, Ocrevus, Tysabri, Lemtrada or Methotrexate, are you aware that you should NOT receive live virus vaccines?  Not Applicable   Do you feel you have adequate family/friend support?  Yes   Do you have health insurance?   Yes   Are you currently employed? No   Do you receive SSDI/SSI?  No   Do you use marijuana or cannabis products? No   Have you been diagnosed with a urinary tract infection since your last visit here? No   Have you been diagnosed with a respiratory tract infection since your last visit here? No   Have you been to the emergency room since your last visit here? Yes-with PE as noted above   Have you been hospitalized since your last visit here?  Yes-overnight-as noted above                Objective:        1. 25 foot timed walk:  Timed 25 Foot Walk: 9/15/2017 11/25/2019   Did patient wear an AFO? No No   Was assistive device used? No No   Time for 25 Foot Walk (seconds) 4.7 5.2   Time for 25 Foot Walk (seconds) 4.7 -     Neurologic Exam     Mental Status   Oriented to person, place, and time.   Follows 3 step commands.   Speech: speech is normal   Level of consciousness: alert  Normal comprehension.     Cranial Nerves     CN III, IV, VI   Extraocular motions are normal.     CN VII   Facial expression full, symmetric.     CN VIII   Hearing: intact (to conversation via video)    CN XII   Tongue deviation: none    Motor Exam   Muscle bulk: normalMMT not performed due to nature of video visit; however, patient able to perform full AROM of UE's and LE's     Gait, Coordination,  and Reflexes     Gait  Gait: (ambulated without assistive device in home)    Tremor   Resting tremor: tremor L thumb.Difficulty standing on one foot  Difficulty with Heel/toe walk  Impaired L hand RSM         Imaging:     Results for orders placed during the hospital encounter of 11/14/19   MRI Brain Demyelinating Without Contrast    Impression Multiple foci of white matter abnormality consistent with the patient's given history of multiple sclerosis.  No diffusion positivity is identified on today's examination to suggest active demyelination.  Mild progression of T2 signal abnormality is suspected since the prior with a more obvious focus in the subcortical left frontal lobe.      Electronically signed by: Denny Johnson MD  Date:    11/14/2019  Time:    15:06     No results found for this or any previous visit.  No results found for this or any previous visit.  Results for orders placed during the hospital encounter of 11/13/18   MRI Brain Demyelinating W W/O Contrast    Impression 1. There is a stable burden of white matter disease scattered throughout the periventricular, pericallosal and subcortical white matter.  There are no new regions of signal abnormality in the brain.  There is no restricted diffusion or pathologic enhancement.  There are no findings to suggest progression of demyelinating disease.  2. There is no hemorrhage, mass effect, acute infarction or abnormal enhancement elsewhere in the brain.      Electronically signed by: Osmani Grullon MD  Date:    11/13/2018  Time:    13:28     Results for orders placed during the hospital encounter of 12/02/19   MRI Cervical Spine Demyelinating W W/O Contrast    Impression 1. No significant change in the appearance of the cervical spine or cervical cord as compared to prior MRI dated 11/13/2018.  Persistent patchy areas of abnormal T2/STIR hyperintense signal within the cervical cord, as detailed above, compatible with lesions of demyelination.  No  definite new cervical cord signal abnormality or postcontrast enhancement to indicate active disease.  2. Mild degenerative changes of the cervical spine without evidence of high-grade spinal canal or neural foraminal stenosis, not significantly changed as compared to the prior study.      Electronically signed by: Richard Garcia  Date:    12/16/2019  Time:    09:37     No results found for this or any previous visit.      Labs:     Lab Results   Component Value Date    QIUZCNLC66GB 62 10/14/2019    HXZLSTAX98ZH 59 04/30/2019    FAMUKUUK87DQ 64 03/08/2018     No results found for: JCVINDEX, JCVANTIBODY  No results found for: LK9XVXGS, ABSOLUTECD3, LF8UEJKG, ABSOLUTECD8, PB2WKSBT, ABSOLUTECD4, LABCD48  Lab Results   Component Value Date    WBC 5.94 06/24/2020    RBC 4.74 06/24/2020    HGB 14.0 06/24/2020    HCT 43.8 06/24/2020    MCV 92 06/24/2020    MCH 29.5 06/24/2020    MCHC 32.0 06/24/2020    RDW 13.2 06/24/2020     06/24/2020    MPV 11.4 06/24/2020    GRAN 4.2 06/24/2020    GRAN 70.2 06/24/2020    LYMPH 1.2 06/24/2020    LYMPH 19.9 06/24/2020    MONO 0.5 06/24/2020    MONO 8.2 06/24/2020    EOS 0.1 06/24/2020    BASO 0.03 06/24/2020    EOSINOPHIL 1.0 06/24/2020    BASOPHIL 0.5 06/24/2020     Sodium   Date Value Ref Range Status   07/02/2020 139 136 - 145 mmol/L Final     Potassium   Date Value Ref Range Status   07/02/2020 4.0 3.5 - 5.1 mmol/L Final     Chloride   Date Value Ref Range Status   07/02/2020 104 95 - 110 mmol/L Final     CO2   Date Value Ref Range Status   07/02/2020 24 23 - 29 mmol/L Final     Glucose   Date Value Ref Range Status   07/02/2020 92 70 - 110 mg/dL Final     BUN, Bld   Date Value Ref Range Status   07/02/2020 30 (H) 8 - 23 mg/dL Final     Creatinine   Date Value Ref Range Status   07/02/2020 1.0 0.5 - 1.4 mg/dL Final     Calcium   Date Value Ref Range Status   07/02/2020 9.5 8.7 - 10.5 mg/dL Final     Total Protein   Date Value Ref Range Status   06/24/2020 6.7 6.0 - 8.4 g/dL Final      Albumin   Date Value Ref Range Status   06/24/2020 3.7 3.5 - 5.2 g/dL Final     Total Bilirubin   Date Value Ref Range Status   06/24/2020 0.6 0.1 - 1.0 mg/dL Final     Comment:     For infants and newborns, interpretation of results should be based  on gestational age, weight and in agreement with clinical  observations.  Premature Infant recommended reference ranges:  Up to 24 hours.............<8.0 mg/dL  Up to 48 hours............<12.0 mg/dL  3-5 days..................<15.0 mg/dL  6-29 days.................<15.0 mg/dL       Alkaline Phosphatase   Date Value Ref Range Status   06/24/2020 84 55 - 135 U/L Final     AST   Date Value Ref Range Status   06/24/2020 13 10 - 40 U/L Final     ALT   Date Value Ref Range Status   06/24/2020 14 10 - 44 U/L Final     Anion Gap   Date Value Ref Range Status   07/02/2020 11 8 - 16 mmol/L Final     eGFR if    Date Value Ref Range Status   07/02/2020 >60.0 >60 mL/min/1.73 m^2 Final     eGFR if non    Date Value Ref Range Status   07/02/2020 58.0 (A) >60 mL/min/1.73 m^2 Final     Comment:     Calculation used to obtain the estimated glomerular filtration  rate (eGFR) is the CKD-EPI equation.        No results found for: HEPBSAG, HEPBSAB, HEPBCAB        MS Impression and Plan:     NEURO MULTIPLE SCLEROSIS IMPRESSION:   MS Status:     Number of relapses in the past year?:  0    Clinical Progression:  Clinically Stable    MRI Progression comment:  Due November 2020, stable in November 2019  Plan:     DMT:  No change in management    DMT comment:  Continue Avonex-has safety labs scheduled-will add Vit D3 screening    Symptom Management:  Implement change in symptom management    Implement Change in Symptom Management:  Bladder (will send educational sheet on bladder irritants)     Next Imaging Due: 11/21/2020     Next Labs Due: 8/25/2020       Will plan to send referral to [facility] once patient has her move finalized      Our video visit today lasted 30  minutes total(see above for face to face time). Over 50% of this visit included discussion of the treatment plan/medication changes/symptom management/exam findings/imaging /coordination of care.     Problem List Items Addressed This Visit        Neuro    MS (multiple sclerosis)     Continue Avonex and high dose Vit D3-safety and screening labs pending            Other    Encounter for long-term (current) use of high-risk medication      Other Visit Diagnoses     Multiple sclerosis    -  Primary    Relevant Orders    Vitamin D    Vitamin D insufficiency        Relevant Orders    Vitamin D    Counseling regarding goals of care        Vibration sensory loss        Neurogenic bladder              Follow up not scheduled as patient is planning move to [facility]  Patient agreed to POC today.    Attending, Dr. Roth, was available during today's encounter.     Fay Snyder PA-C  [facility]

## 2020-08-26 ENCOUNTER — OFFICE VISIT (OUTPATIENT)
Dept: FAMILY MEDICINE | Facility: CLINIC | Age: 70
End: 2020-08-26
Payer: MEDICARE

## 2020-08-26 VITALS
SYSTOLIC BLOOD PRESSURE: 110 MMHG | WEIGHT: 212.06 LBS | HEART RATE: 79 BPM | OXYGEN SATURATION: 97 % | DIASTOLIC BLOOD PRESSURE: 60 MMHG | TEMPERATURE: 97 F | HEIGHT: 66 IN | BODY MASS INDEX: 34.08 KG/M2 | RESPIRATION RATE: 12 BRPM

## 2020-08-26 DIAGNOSIS — N95.1 MENOPAUSAL SYMPTOM: ICD-10-CM

## 2020-08-26 DIAGNOSIS — Z12.31 ENCOUNTER FOR SCREENING MAMMOGRAM FOR MALIGNANT NEOPLASM OF BREAST: ICD-10-CM

## 2020-08-26 DIAGNOSIS — E11.9 TYPE 2 DIABETES MELLITUS WITHOUT COMPLICATION, WITHOUT LONG-TERM CURRENT USE OF INSULIN: ICD-10-CM

## 2020-08-26 DIAGNOSIS — G35 MS (MULTIPLE SCLEROSIS): ICD-10-CM

## 2020-08-26 DIAGNOSIS — E78.5 HYPERLIPIDEMIA ASSOCIATED WITH TYPE 2 DIABETES MELLITUS: ICD-10-CM

## 2020-08-26 DIAGNOSIS — E66.01 MORBID OBESITY WITH BMI OF 40.0-44.9, ADULT: ICD-10-CM

## 2020-08-26 DIAGNOSIS — Z78.0 ASYMPTOMATIC MENOPAUSAL STATE: ICD-10-CM

## 2020-08-26 DIAGNOSIS — R73.9 HYPERGLYCEMIA: Primary | ICD-10-CM

## 2020-08-26 DIAGNOSIS — E55.9 VITAMIN D DEFICIENCY: ICD-10-CM

## 2020-08-26 DIAGNOSIS — E11.69 HYPERLIPIDEMIA ASSOCIATED WITH TYPE 2 DIABETES MELLITUS: ICD-10-CM

## 2020-08-26 PROCEDURE — 99214 OFFICE O/P EST MOD 30 MIN: CPT | Mod: S$PBB,,, | Performed by: FAMILY MEDICINE

## 2020-08-26 PROCEDURE — 99214 OFFICE O/P EST MOD 30 MIN: CPT | Mod: PBBFAC,PO | Performed by: FAMILY MEDICINE

## 2020-08-26 PROCEDURE — 99999 PR PBB SHADOW E&M-EST. PATIENT-LVL IV: CPT | Mod: PBBFAC,,, | Performed by: FAMILY MEDICINE

## 2020-08-26 PROCEDURE — 99214 PR OFFICE/OUTPT VISIT, EST, LEVL IV, 30-39 MIN: ICD-10-PCS | Mod: S$PBB,,, | Performed by: FAMILY MEDICINE

## 2020-08-26 PROCEDURE — 99999 PR PBB SHADOW E&M-EST. PATIENT-LVL IV: ICD-10-PCS | Mod: PBBFAC,,, | Performed by: FAMILY MEDICINE

## 2020-08-26 NOTE — PROGRESS NOTES
Subjective:       Patient ID: Naomi Toledo is a 70 y.o. female.    Chief Complaint: Follow-up (3mth f/u diabetes)    HPI  Review of Systems    Patient Active Problem List   Diagnosis    Hyperglycemia    MS (multiple sclerosis)    Positive NURIA (antinuclear antibody)    Encounter for long-term (current) use of high-risk medication    Urinary urgency    Morbid obesity with BMI of 40.0-44.9, adult    Acne rosacea    Type 2 diabetes mellitus without complication, without long-term current use of insulin    Anticoagulated on eliquis    Gait disturbance     Patient is here for a chronic conditions follow up.    Reviewed labs 8/2020     271 lbs to 212 today doing [facility] diabetic diet. Has been restricting her calories to 1000 yonis/day. Walks 3-4 miles a day.       Recent diagnosis of ihsan PE 3/10/20 . Had mercado and some CP.  was on megace at the time for GYN reasons. On eliquis until 7/2020.  Sx have resolved. No clot in LE .  No h/o clots. Heme/Onc  Dr. Pinto hypercoagand Dr. Moncada 5/25/20 as well for another opinion on how long she will need to be on blood thinner.  Has + NURIA. hypercoag work up done and neg 6/2020     GYN Dr. Hernandez post menopausal bleeding. EMB done since last visit- benign. On megace x 2 months (stopped due to PE)  endometrial ablation done 6/10/20- no bleeding since then        GI Dr. Rich fatty liver. Had colonoscopy and had 2 benign polyps and needs surveillance in 5 years     Neuro Dr. Roth MS on AVONEX     Reviewed labs 8/20 type 2 DM A1c 5.6-diet controlled , lipids at goal. Urine ma normal.  Not on ACEI/ARB or statin. Has CKD stage 3 stable     Still trying to sell her home and move to [facility].  Objective:      Physical Exam  Vitals signs and nursing note reviewed.   Constitutional:       Appearance: She is well-developed.   Cardiovascular:      Rate and Rhythm: Normal rate and regular rhythm.      Pulses:           Dorsalis pedis pulses are 3+ on the right side and 3+  "on the left side.        Posterior tibial pulses are 3+ on the right side and 3+ on the left side.      Heart sounds: Normal heart sounds.   Pulmonary:      Effort: Pulmonary effort is normal.      Breath sounds: Normal breath sounds.   Musculoskeletal:      Right foot: Normal range of motion. No deformity.      Left foot: Normal range of motion. No deformity.   Feet:      Right foot:      Protective Sensation: 10 sites tested. 10 sites sensed.      Skin integrity: Callus present. No ulcer, blister or skin breakdown.      Left foot:      Protective Sensation: 10 sites tested. 10 sites sensed.      Skin integrity: Callus (callus medial great toe ihsan) present. No ulcer, blister, skin breakdown or warmth.      Comments: Thick, hyperkeratotic nail right great toenail and left great toenail absent.  Slightly diminished sensation 10/10  Skin:     General: Skin is warm and dry.   Neurological:      Mental Status: She is alert and oriented to person, place, and time.         Assessment:       1. Hyperglycemia    2. MS (multiple sclerosis)    3. Morbid obesity with BMI of 40.0-44.9, adult    4. Type 2 diabetes mellitus without complication, without long-term current use of insulin    5. Vitamin D deficiency    6. Hyperlipidemia associated with type 2 diabetes mellitus    7. Encounter for screening mammogram for malignant neoplasm of breast    8. Menopausal symptom    9. Asymptomatic menopausal state         Plan:         1. Hyperglycemia  Controlled on current medications.  Continue current medications.      2. MS (multiple sclerosis)  Cont neuro care    3. Morbid obesity with BMI of 40.0-44.9, adult  Counseled patient on his ideal body weight, health consequences of being obese and current recommendations including weekly exercise and a heart healthy diet.  Current BMI is:Estimated body mass index is 34.23 kg/m² as calculated from the following:    Height as of this encounter: 5' 6" (1.676 m).    Weight as of this " encounter: 96.2 kg (212 lb 1.3 oz)..  Patient is aware that ideal BMI < 25 or Weight in (lb) to have BMI = 25: 154.6.        4. Type 2 diabetes mellitus without complication, without long-term current use of insulin  Controlled on current medications.  Continue current medications.  Patient was counseled on proper foot care, always wearing protective shoes and not going barefoot, daily foot checks and proper toenail and skin hygiene.  Daily checks for ulcers, skin breakdown, blisters, etc were encouraged.  Patient was advised to moisturize liberally.  - CBC auto differential; Future  - Comprehensive metabolic panel; Future  - Hemoglobin A1C; Future    5. Vitamin D deficiency  Stable condition.  Continue current medications.  Will adjust based on lab findings or if condition changes.    - Vitamin D; Future    6. Hyperlipidemia associated with type 2 diabetes mellitus  Controlled on current medications.  Continue current medications.    - Lipid Panel; Future    7. Encounter for screening mammogram for malignant neoplasm of breast  Screen and treat as indicated:    - Mammo Digital Screening Bilateral With CAD; Future    8. Menopausal symptom  Screen and treat as indicated:    - DXA Bone Density Spine And Hip; Future    9. Asymptomatic menopausal state   Screen and treat as indicated:    - DXA Bone Density Spine And Hip; Future    Time spent with patient: 20 minutes    Patient with be reevaluated in 6 months or sooner prn    Greater than 50% of this visit was spent counseling as described in above documentation:Yes

## 2020-11-02 ENCOUNTER — PATIENT MESSAGE (OUTPATIENT)
Dept: PSYCHIATRY | Facility: CLINIC | Age: 70
End: 2020-11-02

## 2020-11-02 NOTE — PROGRESS NOTES
Last BG:     Last 6 Patient Entered Readings                                          Most Recent A1c: 5.6% on 8/21/2020  (Goal: 7%)     Recent Readings 8/25/2020 4/19/2020 4/18/2020    Blood Glucose (mg/dL) 103 99 134        HC continued to reach out and encourage readings and communication. Will defer PharmD outreach until readings available for review.

## 2020-11-19 ENCOUNTER — PATIENT MESSAGE (OUTPATIENT)
Dept: OTHER | Facility: OTHER | Age: 70
End: 2020-11-19

## 2021-02-05 ENCOUNTER — PATIENT MESSAGE (OUTPATIENT)
Dept: NEUROLOGY | Facility: CLINIC | Age: 71
End: 2021-02-05

## 2021-05-31 ENCOUNTER — PATIENT MESSAGE (OUTPATIENT)
Dept: PSYCHIATRY | Facility: CLINIC | Age: 71
End: 2021-05-31

## 2021-07-07 ENCOUNTER — PATIENT MESSAGE (OUTPATIENT)
Dept: ADMINISTRATIVE | Facility: HOSPITAL | Age: 71
End: 2021-07-07

## 2021-08-04 ENCOUNTER — PATIENT MESSAGE (OUTPATIENT)
Dept: ADMINISTRATIVE | Facility: HOSPITAL | Age: 71
End: 2021-08-04

## 2021-08-04 DIAGNOSIS — E11.9 TYPE 2 DIABETES MELLITUS WITHOUT COMPLICATION: ICD-10-CM

## 2021-09-01 ENCOUNTER — PATIENT MESSAGE (OUTPATIENT)
Dept: PSYCHIATRY | Facility: CLINIC | Age: 71
End: 2021-09-01

## 2021-09-02 ENCOUNTER — PATIENT MESSAGE (OUTPATIENT)
Dept: PSYCHIATRY | Facility: CLINIC | Age: 71
End: 2021-09-02

## 2021-10-05 ENCOUNTER — PATIENT OUTREACH (OUTPATIENT)
Dept: ADMINISTRATIVE | Facility: HOSPITAL | Age: 71
End: 2021-10-05

## 2021-10-07 ENCOUNTER — PATIENT MESSAGE (OUTPATIENT)
Dept: ADMINISTRATIVE | Facility: HOSPITAL | Age: 71
End: 2021-10-07

## 2021-11-16 LAB — HBA1C MFR BLD: 6.3 % (ref 4–6)

## 2021-12-21 ENCOUNTER — PATIENT MESSAGE (OUTPATIENT)
Dept: NEUROLOGY | Facility: CLINIC | Age: 71
End: 2021-12-21
Payer: MEDICARE

## 2022-02-28 ENCOUNTER — PATIENT MESSAGE (OUTPATIENT)
Dept: PSYCHIATRY | Facility: CLINIC | Age: 72
End: 2022-02-28
Payer: MEDICARE

## 2022-03-15 ENCOUNTER — PATIENT OUTREACH (OUTPATIENT)
Dept: ADMINISTRATIVE | Facility: HOSPITAL | Age: 72
End: 2022-03-15
Payer: MEDICARE

## 2022-05-31 ENCOUNTER — PATIENT MESSAGE (OUTPATIENT)
Dept: ADMINISTRATIVE | Facility: HOSPITAL | Age: 72
End: 2022-05-31
Payer: MEDICARE

## 2022-06-24 ENCOUNTER — PATIENT MESSAGE (OUTPATIENT)
Dept: PSYCHIATRY | Facility: CLINIC | Age: 72
End: 2022-06-24
Payer: MEDICARE

## 2022-12-01 ENCOUNTER — PATIENT MESSAGE (OUTPATIENT)
Dept: PSYCHIATRY | Facility: CLINIC | Age: 72
End: 2022-12-01
Payer: MEDICARE

## 2023-02-20 ENCOUNTER — PATIENT MESSAGE (OUTPATIENT)
Dept: PSYCHIATRY | Facility: CLINIC | Age: 73
End: 2023-02-20
Payer: MEDICARE

## 2023-07-21 ENCOUNTER — PATIENT MESSAGE (OUTPATIENT)
Dept: PSYCHIATRY | Facility: CLINIC | Age: 73
End: 2023-07-21
Payer: MEDICARE

## 2024-01-22 ENCOUNTER — PATIENT MESSAGE (OUTPATIENT)
Dept: PSYCHIATRY | Facility: CLINIC | Age: 74
End: 2024-01-22
Payer: MEDICARE

## 2024-05-02 ENCOUNTER — PATIENT MESSAGE (OUTPATIENT)
Dept: PSYCHIATRY | Facility: CLINIC | Age: 74
End: 2024-05-02
Payer: MEDICARE

## 2024-07-25 ENCOUNTER — PATIENT MESSAGE (OUTPATIENT)
Dept: PSYCHIATRY | Facility: CLINIC | Age: 74
End: 2024-07-25
Payer: MEDICARE

## 2024-08-05 ENCOUNTER — PATIENT MESSAGE (OUTPATIENT)
Dept: PSYCHIATRY | Facility: CLINIC | Age: 74
End: 2024-08-05
Payer: MEDICARE

## 2024-11-13 NOTE — PROGRESS NOTES
"Diabetes Education  Author: Abida Hurtado RD  Date: 2/17/2020    Diabetes Care Management Summary  Diabetes Education Record Assessment/Progress: Initial  Current Diabetes Risk Level: Low   Diabetes Type  Diabetes Type : Type II(Just dx 1/14/2020)    Diabetes History  Diabetes Diagnosis: 0-1 year  Current Treatment: Oral Medication(Just began Metformin 1000 mg in morning only w/food)  Reviewed Problem List with Patient: Yes    Health Maintenance was reviewed today with patient. Discussed with patient importance of routine eye exams, foot exams/foot care, blood work (i.e.: A1c, microalbumin, and lipid), dental visits, yearly flu vaccine, and pneumonia vaccine as indicated by PCP. Patient verbalized understanding.     Health Maintenance Topics with due status: Not Due       Topic Last Completion Date    DEXA SCAN 06/26/2017    Lipid Panel 04/30/2019    TETANUS VACCINE 06/19/2019    Mammogram 09/30/2019     Health Maintenance Due   Topic Date Due    Colonoscopy  01/13/2019       Nutrition  Meal Planning: skipping meals, water, eats out often, snacks between meal(since dx has made a lot of changes--d/c all diet drinks and drinks only water w/lemon; d/c nightime snacking of sweets)  What type of sweetener do you use?: none  What type of beverages do you drink?: water  Meal Plan 24 Hour Recall - Breakfast: (Atkins chocolate shake (160 kcal, 5 gm fiber, 15 gm PRO, 2 gm "net carbs"))  Meal Plan 24 Hour Recall - Lunch: (Salad w/protein (grilled chicken), water OR veggies and some type of protein (no carbs); water)  Meal Plan 24 Hour Recall - Dinner: (same as lunch but does eat out often.  At [employer]--salmon w/broccoil & side salad (instead of potato), water)  Meal Plan 24 Hour Recall - Snack: (mid afternoon and before bed--Atkins shake (instead of sweets).  Have rec decrease shakes and replace with "real" food/snacks )    Monitoring   Blood Glucose Logs: No  In the last month, how often have you had a low blood sugar reaction?: " gait, locomotion, and balance/muscle strength/ROM never  Can you tell when your blood sugar is too high?: no    Exercise   Exercise Type: walking(walks around inside house; limited due to MS)  Intensity: Low  Frequency: Daily  Duration: 15 min    Current Diabetes Treatment   Current Treatment: Oral Medication(Just began Metformin 1000 mg in morning only w/food)    Social History  Preferred Learning Method: Face to Face  Primary Support: Self, Family  Educational Level: Some College  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)  Smoking Status: Never a Smoker  Alcohol Use: Never    Barriers to Change  Barriers to Change: None  Learning Challenges : None    Readiness to Learn   Readiness to Learn : Eager    Diabetes Education Assessment/Progress  Diabetes Disease Process (diabetes disease process and treatment options): Discussion, Demonstrates Understanding/Competency(verbalizes/demonstrates), Individual Session  Nutrition (Incorporating nutritional management into one's lifestyle): Discussion, Demonstrates Understanding/Competency (verbalizes/demonstrates), Individual Session  Physical Activity (incorporating physical activity into one's lifestyle): Discussion, Demonstrates Understanding/Competency (verbalizes/demonstrates), Individual Session  Medications (states correct name, dose, onset, peak, duration, side effects & timing of meds): Demonstrates Understanding/Competency(verbalizes/demonstrates), Individual Session  Acute Complications (preventing, detecting, and treating acute complications): Demonstrates Understanding/Competency (verbalizes/demonstrates), Individual Session  Chronic Complications (preventing, detecting, and treating chronic complications): Discussion, Demonstrates Understanding/Competency (verbalizes/demonstrates), Individual Session  Clinical (diabetes, other pertinent medical history, and relevant comorbidities reviewed during visit): Discussion, Demonstrates Understanding/Competency (verbalizes/demonstrates), Individual Session  Cognitive (knowledge of  self-management skills, functional health literacy): Discussion  Psychosocial (emotional response to diabetes): Demonstrates Understanding/Competency (verbalizes/demonstrates), Individual Session  Diabetes Distress and Support Systems: Discussion  Behavioral (readiness for change, lifestyle practices, self-care behaviors): Discussion    Goals  Patient has selected/evaluated goals during today's session: Yes, selected  Healthy Eating: Set  Start Date: 02/17/20  Target Date: 05/17/20  Physical Activity: In Progress  Monitoring: In Progress  Medications: In Progress  Problem Solving: In Progress  Healthy Coping: In Progress  Reducing Risks: In Progress    Diabetes Care Plan/Intervention  Education Plan/Intervention: Individual Follow-Up DSMT(states home is for sale and will be moving to [facility] soon)    Diabetes Meal Plan  Restrictions: Low Fat, Restricted Carbohydrate  Calories: 1400  Carbohydrate Per Meal: 20-30g  Carbohydrate Per Snack : 7-15g  Fat: (reviewed heart healthy fats w/emphasis on moderation)  Protein: (lean PRO at each feeding)    Today's Self-Management Care Plan was developed with the patient's input and is based on barriers identified during today's assessment.    The long and short-term goals in the care plan were written with the patient/caregiver's input. The patient has agreed to work toward these goals to improve her overall diabetes control.      The patient received a copy of today's self-management plan and verbalized understanding of the care plan, goals, and all of today's instructions.      The patient was encouraged to communicate with her physician and care team regarding her condition(s) and treatment.  I provided the patient with my contact information today and encouraged her to contact me via phone or patient portal as needed.     Education Units of Time   Time Spent: 60 min   gait, locomotion, and balance

## 2024-12-16 ENCOUNTER — PATIENT MESSAGE (OUTPATIENT)
Dept: PSYCHIATRY | Facility: CLINIC | Age: 74
End: 2024-12-16
Payer: MEDICARE